# Patient Record
Sex: FEMALE | Race: WHITE | HISPANIC OR LATINO | Employment: UNEMPLOYED | URBAN - METROPOLITAN AREA
[De-identification: names, ages, dates, MRNs, and addresses within clinical notes are randomized per-mention and may not be internally consistent; named-entity substitution may affect disease eponyms.]

---

## 2017-01-01 ENCOUNTER — TRANSFERRED RECORDS (OUTPATIENT)
Dept: HEALTH INFORMATION MANAGEMENT | Facility: CLINIC | Age: 32
End: 2017-01-01

## 2017-01-01 LAB — PAP SMEAR - HIM PATIENT REPORTED: NEGATIVE

## 2018-06-01 PROCEDURE — 99282 EMERGENCY DEPT VISIT SF MDM: CPT

## 2018-06-02 ENCOUNTER — HOSPITAL ENCOUNTER (EMERGENCY)
Facility: CLINIC | Age: 33
Discharge: HOME | End: 2018-06-02
Attending: EMERGENCY MEDICINE | Admitting: EMERGENCY MEDICINE
Payer: COMMERCIAL

## 2018-06-02 VITALS
SYSTOLIC BLOOD PRESSURE: 166 MMHG | DIASTOLIC BLOOD PRESSURE: 115 MMHG | OXYGEN SATURATION: 99 % | TEMPERATURE: 98.3 F | RESPIRATION RATE: 16 BRPM | HEART RATE: 116 BPM

## 2018-06-02 DIAGNOSIS — H60.502 ACUTE OTITIS EXTERNA OF LEFT EAR, UNSPECIFIED TYPE: ICD-10-CM

## 2018-06-02 RX ORDER — CIPROFLOXACIN AND DEXAMETHASONE 3; 1 MG/ML; MG/ML
3 SUSPENSION/ DROPS AURICULAR (OTIC) 2 TIMES DAILY
Qty: 2.1 ML | Refills: 0 | Status: SHIPPED | OUTPATIENT
Start: 2018-06-02 | End: 2018-06-09

## 2018-06-02 ASSESSMENT — ENCOUNTER SYMPTOMS
COUGH: 0
FEVER: 0
NAUSEA: 0
SHORTNESS OF BREATH: 0
VOMITING: 0

## 2018-06-02 NOTE — DISCHARGE INSTRUCTIONS
"  Infección del oído externo (Adulto)     La otitis externa (también llamada \"oído de nadador\") es ashlyn infección en el canal del oído (auditivo). Generalmente la causan bacterias o un hongo. Puede presentarse unos pocos días después de que quede agua atrapada en el canal auditivo (al nadar o bañarse). También puede aparecer después de ashlyn limpieza muy profunda del canal auditivo con un hisopo de algodón u otro objeto. A veces, los productos para el cuidado del kristan entran en el canal auditivo y causan clara problema.  Los síntomas pueden incluir dolor, fiebre, comezón, enrojecimiento, secreción o hinchazón del canal auditivo. También puede presentarse ashlyn pérdida temporal de la audición.   Cuidados en jimenez casa    No intente limpiar el canal auditivo. Eso puede empujar el pus y las bacterias y hacer que entren todavía más en el canal.    Use las gotas que le recetaron para los oídos según las indicaciones. Estas ayudan a reducir la hinchazón y combaten la infección. Si se colocó ashlyn mecha en el canal auditivo, eche las gotas linda en el extremo de la mecha. Esta hará que el medicamento penetre en el canal auditivo, aunque esté cerrado por la hinchazón.    Se puede colocar ashlyn chas de algodón (que quede floja) en la parte externa de la oreja para absorber el líquido que pudiese supurar del oído.    A menos que le hayan recetado otro medicamento, puede usar paracetamol (acetaminofén) o ibuprofeno para para controlar el dolor. Nota: Si tiene alguna enfermedad crónica del hígado o de los riñones, o si alguna vez tuvo ashlyn úlcera estomacal o sangrado gastrointestinal, hable con jimenez proveedor de atención médica antes de mickey estos medicamentos.    No permita que le entre agua en el oído mark el baño. Además, evite nadar hasta que se haya curado la infección.   Prevención    Mantenga secos naveen oídos. Eso ayuda a reducir el riesgo de infección. Seque naveen orejas con ashlyn toalla o un secador de pelo después de mojarse. " Además, use tapones de oído cuando nade.    No introduzca ningún objeto en el oído para quitar cera.    Si siente que tiene agua atrapada en jimenez oído, colóquese enseguida gotas para los oídos. Puede comprarlas sin receta en la mayoría de las farmacias. Actúan quitando el agua del canal auditivo.   Visitas de control   Programe ashlyn visita de control con jimenez proveedor de atención médica en ashlyn semana, o según le hayan aconsejado.   Cuándo debe buscar atención médica   Llame enseguida a jimenez proveedor de atención médica si tiene cualquiera de los siguientes síntomas:    El dolor de oído empeora o no se grant después de kimmy días de tratamiento    Se presentan enrojecimiento o hinchazón en el oído externo o estos síntomas empeoran    Dolor de omar    Dolor de jameson o rigidez del jameson    Somnolencia o confusión    Fiebre de 100.4  F (38  C) o más, o según le haya indicado jimenez proveedor de atención médica    Convulsión   Date Last Reviewed: 3/22/2015    [id number] [employer]. [address] [place] [address]. Todos los derechos reservados. Esta información no pretende sustituir la atención médica profesional. Sólo jimenez médico puede diagnosticar y tratar un problema de suzette.

## 2018-06-02 NOTE — ED TRIAGE NOTES
Patient presents with complaint of right ear pain and drainage. Patient states she had drainage from the right ear starting this afternoon and it became painful at about the same time. States that her balance feels slightly off, but denies any other complaints. ABC intact, A&Ox4.

## 2018-06-02 NOTE — ED AVS SNAPSHOT
[facility] Emergency Department    [address]    [facility] [address]    Phone:  [phone]    Fax:  [phone]                                       Shila Batres   MRN: [id number]    Department:  [facility] Emergency Department   Date of Visit:  6/1/2018           After Visit Summary Signature Page     I have received my discharge instructions, and my questions have been answered. I have discussed any challenges I see with this plan with the nurse or doctor.    ..........................................................................................................................................  Patient/Patient Representative Signature      ..........................................................................................................................................  Patient Representative Print Name and Relationship to Patient    ..................................................               ................................................  Date                                            Time    ..........................................................................................................................................  Reviewed by Signature/Title    ...................................................              ..............................................  Date                                                            Time

## 2018-06-02 NOTE — ED AVS SNAPSHOT
" [facility] Emergency Department    [address] [facility] [address]    Phone:  [phone]    Fax:  [phone]                                       Shila Batres   MRN: [id number]    Department:  [facility] Emergency Department   Date of Visit:  6/1/2018           Patient Information     Date Of Birth          1985        Your diagnoses for this visit were:     Acute otitis externa of left ear, unspecified type        You were seen by Igor Barraza DO.      Follow-up Information     Follow up with Your primary care clinic. Call in 2 days.    Why:  As needed        Follow up with Tyree Hernandez MD. Call on 6/4/2018.    Specialty:  Otolaryngology    Why:  For further Ear specialty care follow up    Contact information:    [facility]  [address]  [facility] [address] [phone]          Follow up with [facility] Emergency Department.    Specialty:  EMERGENCY MEDICINE    Why:  If symptoms worsen    Contact information:    [address] [facility] [phone]        Discharge Instructions         Infección del oído externo (Adulto)     La otitis externa (también llamada \"oído de nadador\") es ashlyn infección en el canal del oído (auditivo). Generalmente la causan bacterias o un hongo. Puede presentarse unos pocos días después de que quede agua atrapada en el canal auditivo (al nadar o bañarse). También puede aparecer después de ashlyn limpieza muy profunda del canal auditivo con un hisopo de algodón u otro objeto. A veces, los productos para el cuidado del kristan entran en el canal auditivo y causan clara problema.  Los síntomas pueden incluir dolor, fiebre, comezón, enrojecimiento, secreción o hinchazón del canal auditivo. También puede presentarse ashlyn pérdida temporal de la audición.   Cuidados en jimenez casa    No intente limpiar el canal auditivo. Eso puede empujar el pus y las bacterias y " hacer que entren todavía más en el canal.    Use las gotas que le recetaron para los oídos según las indicaciones. Estas ayudan a reducir la hinchazón y combaten la infección. Si se colocó ashlyn mecha en el canal auditivo, eche las gotas linda en el extremo de la mecha. Esta hará que el medicamento penetre en el canal auditivo, aunque esté cerrado por la hinchazón.    Se puede colocar ashlyn chas de algodón (que quede floja) en la parte externa de la oreja para absorber el líquido que pudiese supurar del oído.    A menos que le hayan recetado otro medicamento, puede usar paracetamol (acetaminofén) o ibuprofeno para para controlar el dolor. Nota: Si tiene alguna enfermedad crónica del hígado o de los riñones, o si alguna vez tuvo ashlyn úlcera estomacal o sangrado gastrointestinal, hable con jimenez proveedor de atención médica antes de mickey estos medicamentos.    No permita que le entre agua en el oído mark el baño. Además, evite nadar hasta que se haya curado la infección.   Prevención    Mantenga secos naveen oídos. Eso ayuda a reducir el riesgo de infección. Seque naveen orejas con ashlyn toalla o un secador de pelo después de mojarse. Además, use tapones de oído cuando nade.    No introduzca ningún objeto en el oído para quitar cera.    Si siente que tiene agua atrapada en jimenez oído, colóquese enseguida gotas para los oídos. Puede comprarlas sin receta en la mayoría de las farmacias. Actúan quitando el agua del canal auditivo.   Visitas de control   Programe ashlyn visita de control con jimenez proveedor de atención médica en ashlyn semana, o según le hayan aconsejado.   Cuándo debe buscar atención médica   Llame enseguida a jimenez proveedor de atención médica si tiene cualquiera de los siguientes síntomas:    El dolor de oído empeora o no se grant después de kimmy días de tratamiento    Se presentan enrojecimiento o hinchazón en el oído externo o estos síntomas empeoran    Dolor de omar    Dolor de jameson o rigidez del jameson    Somnolencia o  confusión    Fiebre de 100.4  F (38  C) o más, o según le haya indicado jimenez proveedor de atención médica    Convulsión   Date Last Reviewed: 3/22/2015    9111-1542 [employer]. [address] [place] [address], [place] [address]. Todos los derechos reservados. Esta información no pretende sustituir la atención médica profesional. Sólo jimenez médico puede diagnosticar y tratar un problema de suzette.          24 Hour Appointment Hotline       To make an appointment at any [facility], call [phone] ([phone]). If you don't have a family doctor or clinic, we will help you find one. [facility] clinics are conveniently located to serve the needs of you and your family.             Review of your medicines      START taking        Dose / Directions Last dose taken    ciprofloxacin-dexamethasone otic suspension   Commonly known as:  CIPRODEX   Dose:  3 drop   Quantity:  2.1 mL        Place 3 drops Into the left ear 2 times daily for 7 days   Refills:  0          Our records show that you are taking the medicines listed below. If these are incorrect, please call your family doctor or clinic.        Dose / Directions Last dose taken    ibuprofen 600 MG tablet   Commonly known as:  ADVIL/MOTRIN   Dose:  600 mg   Quantity:  30 tablet        Take 1 tablet by mouth every 6 hours as needed for pain.   Refills:  1        NO ACTIVE MEDICATIONS        Refills:  0                Prescriptions were sent or printed at these locations (1 Prescription)                   Other Prescriptions                Printed at Department/Unit printer (1 of 1)         ciprofloxacin-dexamethasone (CIPRODEX) otic suspension                Orders Needing Specimen Collection     None      Pending Results     No orders found from 5/31/2018 to 6/3/2018.            Pending Culture Results     No orders found from 5/31/2018 to 6/3/2018.            Pending Results Instructions     If you had any lab results that were not finalized at the time of  your Discharge, you can call the ED Lab Result RN at [phone]. You will be contacted by this team for any positive Lab results or changes in treatment. The nurses are available 7 days a week from 10A to 6:30P.  You can leave a message 24 hours per day and they will return your call.        Test Results From Your Hospital Stay               Clinical Quality Measure: Blood Pressure Screening     Your blood pressure was checked while you were in the emergency department today. The last reading we obtained was  BP: (!) 166/115 . Please read the guidelines below about what these numbers mean and what you should do about them.  If your systolic blood pressure (the top number) is less than 120 and your diastolic blood pressure (the bottom number) is less than 80, then your blood pressure is normal. There is nothing more that you need to do about it.  If your systolic blood pressure (the top number) is 120-139 or your diastolic blood pressure (the bottom number) is 80-89, your blood pressure may be higher than it should be. You should have your blood pressure rechecked within a year by a primary care provider.  If your systolic blood pressure (the top number) is 140 or greater or your diastolic blood pressure (the bottom number) is 90 or greater, you may have high blood pressure. High blood pressure is treatable, but if left untreated over time it can put you at risk for heart attack, stroke, or kidney failure. You should have your blood pressure rechecked by a primary care provider within the next 4 weeks.  If your provider in the emergency department today gave you specific instructions to follow-up with your doctor or provider even sooner than that, you should follow that instruction and not wait for up to 4 weeks for your follow-up visit.        Thank you for choosing [facility]       Thank you for choosing [facility] for your care. Our goal is always to provide you with excellent care. Hearing back from our patients is  "one way we can continue to improve our services. Please take a few minutes to complete the written survey that you may receive in the mail after you visit with us. Thank you!        [employer] Information     [employer] lets you send messages to your doctor, view your test results, renew your prescriptions, schedule appointments and more. To sign up, go to [email or web address] . Click on \"Log in\" on the left side of the screen, which will take you to the Welcome page. Then click on \"Sign up Now\" on the right side of the page.     You will be asked to enter the access code listed below, as well as some personal information. Please follow the directions to create your username and password.     Your access code is: [email or web address]  Expires: 2018 12:54 AM     Your access code will  in 90 days. If you need help or a new code, please call your [facility] clinic or [phone].        Care EveryWhere ID     This is your Care EveryWhere ID. This could be used by other organizations to access your [facility] medical records  [id number]        Equal Access to Services     VIVIANA PEPPER : Hadran Lopez, collette bocanegra, dong stover, geraldine meier. So [facility] [phone].    ATENCIÓN: Si habla español, tiene a jimenez disposición servicios gratuitos de asistencia lingüística. Llame al [phone].    We comply with applicable federal civil rights laws and [place] laws. We do not discriminate on the basis of race, color, national origin, age, disability, sex, sexual orientation, or gender identity.            After Visit Summary       This is your record. Keep this with you and show to your community pharmacist(s) and doctor(s) at your next visit.                  "

## 2018-06-02 NOTE — ED PROVIDER NOTES
History     Chief Complaint:  Otalgia    Last night drainage from ear, left  Hurt when she tried applying drops that dry ear  Yellowish red drainage  Itchiness in left ear 2-3 days  shortness of breath, cough, fever no, nose drainage though  Hearing issue, light headed    The history is provided by the patient.      Shila Batres is a 33 year old female who presents to the emergency department for evaluation of otalgia. The patient reports that she has had itchiness inside left ear for the past 2-3 days, and has been scratching it and using cotton swabs to rub her left ear. Yesterday, she attempted applying drops to dry her ear but discovered that application caused her pain in the ear. Currently, the patient reports that since last night, she has had drainage from her left ear, accompanied by otalgia, which prompts her visit to the ED today. She notes the drainage is yellowish red and that she has had difficulty hearing others' speech and felt light headed. The patient denies any shortness of breath, cough, or fever.    Allergies:  NKDA     Medications:    Ibuprofen    Past Medical History:    H1N1  GDM  Ear perforation left ear    Past Surgical History:    Left ear surgery     Family History:    Diabetes    Social History:  Never smoker.  Negative for alcohol use.  Marital Status:  Single [1]     Review of Systems   Constitutional: Negative for fever.   HENT: Positive for ear discharge (left), ear pain (left) and hearing loss (slight).    Respiratory: Negative for cough and shortness of breath.    Gastrointestinal: Negative for nausea and vomiting.   All other systems reviewed and are negative.    Physical Exam     Patient Vitals for the past 24 hrs:   BP Temp Temp src Pulse Resp SpO2   06/02/18 0036 - 98.3  F (36.8  C) Oral - - -   06/01/18 2329 (!) 166/115 98.8  F (37.1  C) Oral 129 16 99 %       Physical Exam  Constitutional: Patient appears well-developed and well-nourished. There is no acute distress.    HEENT:                         Head: No external signs of trauma. No lesions noted.                        Eyes: PEERL, EOMI B/L, no pain or limitation of superior gaze                        Ears:                                               Normal right TM and external canal.                                               The left external canal is mildly swollen with some serosanguinous drainage. Full evaluation of the TM is not possible due to swelling. No obvious perforation at this time.                        Nose: Noncongested, no exudates. No rhinorrhea. No FB noted                        Mouth/Throat:                                               Mucous membranes are moist and normal.   Neck: FROM. Neck is supple  Cardiovascular: Tachycardic rate, regular rhythm and normal heart sounds.  Exam reveals no gallop and no friction rub.  No murmur heard. Equal B/L peripheral pulses.  Pulmonary/Chest: Effort normal and breath sounds normal. No respiratory distress. Patient has no wheezes. Patient has no rales.   Abdominal: Soft. There is no tenderness.   Extremities: No edema noted  Neurological: Patient is alert and oriented to person, place, and time. Hearing appears equal to finger rub testing.  Skin: Skin is warm and dry. There is no diaphoresis noted.     Emergency Department Course     Emergency Department Course:  Nursing notes and vitals reviewed.     0044 I performed an exam of the patient as documented above.     0058 I rechecked the patient and discussed the results of her workup thus far. Heart rate has improved to 116. The patient notes she drank coffee before coming in and does feel mildly anxious about her ear.    Findings and plan explained to the Patient. Patient discharged home with instructions regarding supportive care, medications, and reasons to return. The importance of close follow-up was reviewed. The patient was prescribed Ciprodex.    Impression & Plan      Medical Decision  Making:  Shila Batres presents for evaluation of otalgia on the left side.  The patient has an exam consistent with otitis externa.  Differential considered in this patient with otalgia included mastoiditis, meningitis, perforation, cerumen impaction, mass, dental abscess, or peritonsillar abscess, referred pain, cholesteatoma, otitis externa, etc.  She may take Tylenol or Ibuprofen for pain.  Wick was not placed.  Antibiotic drops w/steroids were prescribed. Heart rate improved without intervention in the ED.  The patient admits to drinking coffee and does feel somewhat anxious about her ear. Return if increasing pain, fever, decrease in hearing or ear discharge.  Follow-up with primary physician in 7-10 days, if symptoms persist and ENT consultation may be needed as outpatient.  Critical Care time:  none    Diagnosis:    ICD-10-CM   1. Acute otitis externa of left ear, unspecified type H60.502       Disposition:  discharged to home with Ciprodex.    Discharge Medications:  New Prescriptions    CIPROFLOXACIN-DEXAMETHASONE (CIPRODEX) OTIC SUSPENSION    Place 3 drops Into the left ear 2 times daily for 7 days     Arthur ZEE, am serving as a scribe on 6/2/2018 at 12:44 AM to personally document services performed by Igor Barrzaa DO based on my observations and the provider's statements to me.      Arthur Horner  6/1/2018   [facility] [place] EMERGENCY DEPARTMENT       Igor Barraza DO  06/02/18 0104

## 2018-08-13 ENCOUNTER — OFFICE VISIT (OUTPATIENT)
Dept: INTERNAL MEDICINE | Facility: CLINIC | Age: 33
End: 2018-08-13
Payer: COMMERCIAL

## 2018-08-13 VITALS
BODY MASS INDEX: 28 KG/M2 | SYSTOLIC BLOOD PRESSURE: 140 MMHG | OXYGEN SATURATION: 98 % | RESPIRATION RATE: 16 BRPM | WEIGHT: 142.6 LBS | HEIGHT: 60 IN | TEMPERATURE: 98.2 F | HEART RATE: 91 BPM | DIASTOLIC BLOOD PRESSURE: 96 MMHG

## 2018-08-13 DIAGNOSIS — Z86.69 HISTORY OF RECURRENT EAR INFECTION: ICD-10-CM

## 2018-08-13 DIAGNOSIS — Z13.220 SCREENING FOR CHOLESTEROL LEVEL: ICD-10-CM

## 2018-08-13 DIAGNOSIS — R03.0 ELEVATED BLOOD PRESSURE READING WITHOUT DIAGNOSIS OF HYPERTENSION: ICD-10-CM

## 2018-08-13 DIAGNOSIS — Z13.1 SCREENING FOR DIABETES MELLITUS: ICD-10-CM

## 2018-08-13 DIAGNOSIS — Z01.818 PREOPERATIVE EXAMINATION: Primary | ICD-10-CM

## 2018-08-13 LAB
ALBUMIN SERPL-MCNC: 4.1 G/DL (ref 3.4–5)
ALP SERPL-CCNC: 107 U/L (ref 40–150)
ALT SERPL W P-5'-P-CCNC: 34 U/L (ref 0–50)
ANION GAP SERPL CALCULATED.3IONS-SCNC: 6 MMOL/L (ref 3–14)
AST SERPL W P-5'-P-CCNC: 37 U/L (ref 0–45)
BILIRUB SERPL-MCNC: 0.4 MG/DL (ref 0.2–1.3)
BUN SERPL-MCNC: 12 MG/DL (ref 7–30)
CALCIUM SERPL-MCNC: 8.7 MG/DL (ref 8.5–10.1)
CHLORIDE SERPL-SCNC: 105 MMOL/L (ref 94–109)
CHOLEST SERPL-MCNC: 146 MG/DL
CO2 SERPL-SCNC: 30 MMOL/L (ref 20–32)
CREAT SERPL-MCNC: 0.75 MG/DL (ref 0.52–1.04)
GFR SERPL CREATININE-BSD FRML MDRD: 88 ML/MIN/1.7M2
GLUCOSE SERPL-MCNC: 96 MG/DL (ref 70–99)
HDLC SERPL-MCNC: 41 MG/DL
LDLC SERPL CALC-MCNC: 90 MG/DL
NONHDLC SERPL-MCNC: 105 MG/DL
POTASSIUM SERPL-SCNC: 4 MMOL/L (ref 3.4–5.3)
PROT SERPL-MCNC: 7.6 G/DL (ref 6.8–8.8)
SODIUM SERPL-SCNC: 141 MMOL/L (ref 133–144)
TRIGL SERPL-MCNC: 77 MG/DL

## 2018-08-13 PROCEDURE — 36415 COLL VENOUS BLD VENIPUNCTURE: CPT | Performed by: INTERNAL MEDICINE

## 2018-08-13 PROCEDURE — 80053 COMPREHEN METABOLIC PANEL: CPT | Performed by: INTERNAL MEDICINE

## 2018-08-13 PROCEDURE — 80061 LIPID PANEL: CPT | Performed by: INTERNAL MEDICINE

## 2018-08-13 PROCEDURE — 99215 OFFICE O/P EST HI 40 MIN: CPT | Performed by: INTERNAL MEDICINE

## 2018-08-13 NOTE — MR AVS SNAPSHOT
After Visit Summary   8/13/2018    Shila Batres    MRN: [id number]           Patient Information     Date Of Birth          1985        Visit Information        Provider Department      8/13/2018 9:45 AM Kassandra Dey MD [facility]        Today's Diagnoses     Screening for cholesterol level    -  1    Screening for diabetes mellitus          Care Instructions    Fasting labs today.    ---    STOP Phentermine - can cause high blood pressure.    ---    Follow-up in ~2 weeks for blood pressure recheck. If still high then (>130/80) then we will obtain and EKG treat.    ---    May proceed with surgery (we will fax H&P to surgery center).     ---    Avoid ibuprofen, Motrin, Advil, and oral decongestants as much as possible (these can increase blood pressure).                   Follow-ups after your visit        Who to contact     If you have questions or need follow up information about today's clinic visit or your schedule please contact [facility] directly at [phone].  Normal or non-critical lab and imaging results will be communicated to you by MyChart, letter or phone within 4 business days after the clinic has received the results. If you do not hear from us within 7 days, please contact the clinic through MyChart or phone. If you have a critical or abnormal lab result, we will notify you by phone as soon as possible.  Submit refill requests through [employer] or call your pharmacy and they will forward the refill request to us. Please allow 3 business days for your refill to be completed.          Additional Information About Your Visit        Care EveryWhere ID     This is your Care EveryWhere ID. This could be used by other organizations to access your [facility] medical records  [id number]        Your Vitals Were     Pulse Temperature Respirations Height Pulse Oximetry BMI (Body Mass Index)    91 98.2  F (36.8  C) (Oral) 16 5' (1.524  m) 98% 27.85 kg/m2       Blood Pressure from Last 3 Encounters:   08/13/18 (!) 140/96   06/01/18 (!) 166/115   03/08/16 (!) 128/92    Weight from Last 3 Encounters:   08/13/18 142 lb 9.6 oz (64.7 kg)   02/21/16 125 lb (56.7 kg)   12/19/11 136 lb 11.2 oz (62 kg)              We Performed the Following     Comprehensive metabolic panel     Lipid Profile        Primary Care Provider Fax #    Physician No Ref-Primary [phone]       No address on file        Equal Access to Services     [facility] : Hadii doris wilsono Sorenea, waaxda luqadaha, qaybta kaalmanestor stover, geraldine mitchell . So [facility] [phone].    ATENCIÓN: Si habla español, tiene a jimenez disposición servicios gratuitos de asistencia lingüística. Llame al [phone].    We comply with applicable federal civil rights laws and [place] laws. We do not discriminate on the basis of race, color, national origin, age, disability, sex, sexual orientation, or gender identity.            Thank you!     Thank you for choosing [facility]  for your care. Our goal is always to provide you with excellent care. Hearing back from our patients is one way we can continue to improve our services. Please take a few minutes to complete the written survey that you may receive in the mail after your visit with us. Thank you!             Your Updated Medication List - Protect others around you: Learn how to safely use, store and throw away your medicines at www.disposemymeds.org.          This list is accurate as of 8/13/18  9:56 AM.  Always use your most recent med list.                   Brand Name Dispense Instructions for use Diagnosis    ADIPEX-P PO

## 2018-08-13 NOTE — PATIENT INSTRUCTIONS
Fasting labs today.    ---    STOP Phentermine - can cause high blood pressure.    ---    Follow-up in ~2 weeks for blood pressure recheck. If still high then (>130/80) then we will obtain and EKG treat.    ---    May proceed with surgery (we will fax H&P to surgery center).     ---    Avoid ibuprofen, Motrin, Advil, and oral decongestants as much as possible (these can increase blood pressure).

## 2018-08-13 NOTE — PROGRESS NOTES
SUBJECTIVE:                                                      HPI: Shila Batres is a pleasant 33 year old female who presents for preoperative evaluation.    Surgeon: Mary  Date: 8/24/18  Location: [facility], Fax# [phone]  Surgery: LEFT tympanoplasty (low risk)  Indication: recurrent ear infections    Past Medical History:   Diagnosis Date     NO ACTIVE PROBLEMS      Personal or family history of excessive or prolonged bleeding? No  Personal or family history of blood clots? No  History of snoring/Sleep Apnea? No  History of blood transfusions? No    Clinical Predictors of Increased Risk: none    Past Surgical History:   Procedure Laterality Date     TYMPANOPLASTY Left 1996     Artificial assistive devices, such as pacemakers, artificial cardiac valves, or artificial joints? No    Allergies   Allergen Reactions     Nkda [No Known Drug Allergies]      Personal or family history of allergy to anesthesia? No  Allergy to local or topical analgesics? No  Allergy to latex? No  Allergy to adhesives/skin preparations (chlorhexadine)? No    Current Outpatient Prescriptions   Medication Sig     Phentermine HCl (ADIPEX-P PO)      Over the counter medications? Tylenol as needed   Vitamin Supplements? No  Herbal Supplements? No    Family History   Problem Relation Age of Onset     Type 2 Diabetes Mother      Hypertension Mother      Hyperlipidemia Mother      Lupus Sister      Unknown/Adopted Father      Myocardial Infarction No family hx of      Cerebrovascular Disease No family hx of      Coronary Artery Disease Early Onset No family hx of      Breast Cancer No family hx of      Ovarian Cancer No family hx of      Colon Cancer No family hx of      Occupational History     Payroll      Social History Main Topics     Smoking status: Never Smoker     Smokeless tobacco: Never Used     Alcohol use       Comment: 2-3 drinks, 2x/month     Drug use: No     Sexual activity: Yes     Partners: Male      Birth control/ protection: Condom     Social History Narrative    Single.    3 kids (18, 11, and 8 as of 2018).    1 grandchild (as of 2018).      Functional capacity: Can do heavy work around the house such as scrubbing floors or lifting or moving heavy furniture (4-10 METs)    ROS:  Constitutional: denies unintentional weight loss or gain; denies fevers, chills, or sweats     Cardiovascular: denies chest pain, palpitations, or edema  Respiratory: denies cough, wheezing, shortness of breath, or dyspnea on exertion  Gastrointestinal: denies nausea, vomiting, constipation, diarrhea, or abdominal pain  Genitourinary: denies urinary frequency, urgency, dysuria, or hematuria  Integumentary: denies rash or pruritus  Musculoskeletal: denies back pain, muscle pain, joint pain, or joint swelling  Neurologic: denies focal weakness, numbness, or tingling  Hematologic/Immunologic: denies history of anemia or blood transfusions  Endocrine: denies heat or cold intolerance; denies polyuria, polydipsia  Psychiatric: denies depression or anxiety    OBJECTIVE:                                                      BP (!) 140/96  Pulse 91  Temp 98.2  F (36.8  C) (Oral)  Resp 16  Ht 5' (1.524 m)  Wt 142 lb 9.6 oz (64.7 kg)  SpO2 98%  BMI 27.85 kg/m2  Constitutional: well-appearing  Eyes: normal conjunctivae and lids; pupils equal, round, and reactive to light  Ears, Nose, Mouth, and Throat: normal ears and nose; tympanic membranes visualized and normal; normal lips, teeth, and gums; no oropharyngeal lesions or ulcers  Neck: supple and symmetric; no lymphadenopathy; no thyromegaly or masses  Respiratory: normal respiratory effort; clear to auscultation bilaterally  Cardiovascular: regular rate and rhythm; pedal pulses palpable; no edema  Gastrointestinal: soft, non-tender, non-distended, and bowel sounds present; no organomegaly or masses  Musculoskeletal: normal gait and station  Psych: normal judgment and insight; normal mood  and affect; recent and remote memory intact; oriented to time, place, and person    ASSESSMENT/PLAN:                                                      Shila Batres is a pleasant 33 year old female who presents for a preoperative evaluation. She is at low clinical risk for a low risk procedure.    (Z01.818) Preoperative examination  (primary encounter diagnosis)  (Z86.69) History of recurrent ear infection  (R03.0) Elevated blood pressure reading without diagnosis of hypertension  Comment: elevated blood pressure may be related to chronic phentermine use.  Plan:    - STOP phentermine.   - follow-up in ~2 weeks for blood pressure recheck.    - if still elevated, will obtain EKG and start medication.   - may proceed with surgery in the meantime.   - no additional preoperative testing indicated at this time    ---    (Z13.220) Screening for cholesterol level  (Z13.1) Screening for diabetes mellitus  Comment: unrelated to surgery.  Plan: fasting labs today.    RECOMMEND PROCEEDING WITH SURGERY: YES.    Thank you for referring Shila Batres to me for consultation and allowing me to participate in her care.    The instructions on the AVS were discussed and explained to the patient. Patient expressed understanding of instructions.    (Chart documentation was completed, in part, with [employer] voice-recognition software. Even though reviewed, some grammatical, spelling, and word errors may remain.)    Kassandra Dey MD   [address] [address]  T: [phone], F: [phone]

## 2018-08-13 NOTE — LETTER
08/13/18      [address] [address]res  [address] [place] [address]        Dear ,    It was a pleasure meeting you the other day! I hope this finds you well.    I wanted to let you know that your labs came back as normal.    See you in a couple weeks for a blood pressure recheck.     Please feel free to contact me with any questions or concerns.      Take care,    Kassandra Dey MD   [address] [address]  T: [phone], F: [phone]

## 2018-08-19 ENCOUNTER — HEALTH MAINTENANCE LETTER (OUTPATIENT)
Age: 33
End: 2018-08-19

## 2018-10-25 ENCOUNTER — OFFICE VISIT (OUTPATIENT)
Dept: INTERNAL MEDICINE | Facility: CLINIC | Age: 33
End: 2018-10-25
Payer: COMMERCIAL

## 2018-10-25 VITALS
SYSTOLIC BLOOD PRESSURE: 134 MMHG | RESPIRATION RATE: 14 BRPM | HEART RATE: 88 BPM | DIASTOLIC BLOOD PRESSURE: 86 MMHG | OXYGEN SATURATION: 98 % | BODY MASS INDEX: 30.08 KG/M2 | TEMPERATURE: 98.5 F | WEIGHT: 154 LBS

## 2018-10-25 DIAGNOSIS — I10 BENIGN ESSENTIAL HYPERTENSION: Primary | ICD-10-CM

## 2018-10-25 DIAGNOSIS — E66.9 OBESITY (BMI 30-39.9): ICD-10-CM

## 2018-10-25 PROCEDURE — 99213 OFFICE O/P EST LOW 20 MIN: CPT | Performed by: INTERNAL MEDICINE

## 2018-10-25 RX ORDER — LOSARTAN POTASSIUM 50 MG/1
50 TABLET ORAL DAILY
Qty: 90 TABLET | Refills: 3 | Status: SHIPPED | OUTPATIENT
Start: 2018-10-25 | End: 2019-10-14

## 2018-10-25 NOTE — PATIENT INSTRUCTIONS
START losartan 50mg daily.    --    Referral to nutrition placed - number below.    --    Try to exercise ~5 times a weak (goal is 30-45 minutes of huffing, puffing, and sweating).

## 2018-10-25 NOTE — PROGRESS NOTES
SUBJECTIVE:                                                      HPI: Shila Batres is a pleasant 33 year old female who presents for blood pressure follow-up:    Blood pressure was elevated at her preop in August. Asked to stop phentermine (was on chronically for weight loss) and to follow-up.     Blood pressure is improved but still borderline/elevated.     She feels well - no complaints.     Of note patient is obese and exercises ~2x/week. She has not had much success with diets in the past and is interested in meeting with a nutritionist to discuss weight loss. She is also aware that she needs to exercise more frequently and vigorously to help achieve weight loss.     The medication, allergy, and problem lists have been reviewed and updated as appropriate.       OBJECTIVE:                                                      /86 (BP Location: Left arm, Patient Position: Chair, Cuff Size: Adult Large)  Pulse 88  Temp 98.5  F (36.9  C) (Oral)  Resp 14  Wt 154 lb (69.9 kg)  SpO2 98%  BMI 30.08 kg/m2  Constitutional: well-appearing  Psych: normal judgment and insight; normal mood and affect; recent and remote memory intact      ASSESSMENT/PLAN:                                                      (I10) Benign essential hypertension  (primary encounter diagnosis)  Comment: borderline/elevated blood pressure without medication.   Plan:    - patient encouraged to adhere to a heart healthy diet, exercise regularly, and lose weight.   - START losartan 50mg daily.   - follow-up in 1 month for blood pressure check, EKG, and BMP.     (E66.9) Obesity (BMI 30-39.9)  Plan:    - referred to nutrition to discuss weight loss.   - patient to exercise regularly (at least 30 minutes/day, 5 days/week of cardiovascularly vigorous exercise).    The instructions on the AVS were discussed and explained to the patient. Patient expressed understanding of instructions.    (Chart documentation was completed, in part, with  Dr[employer] voice-recognition software. Even though reviewed, some grammatical, spelling, and word errors may remain.)    Kassandra Dey MD   [address] [address]  T: [phone], F: [phone]

## 2018-10-25 NOTE — MR AVS SNAPSHOT
After Visit Summary   10/25/2018    Shila Batres    MRN: [id number]           Patient Information     Date Of Birth          1985        Visit Information        Provider Department      10/25/2018 1:15 PM Kassandra Dey MD [facility]        Today's Diagnoses     Benign essential hypertension    -  1      Care Instructions    START losartan 50mg daily.    --    Referral to nutrition placed - number below.    --    Try to exercise ~5 times a weak (goal is 30-45 minutes of huffing, puffing, and sweating).           Follow-ups after your visit        Additional Services     NUTRITION REFERRAL       Your provider has referred you to: [facility]: [facility] [phone]   [email or web address]    Please be aware that coverage of these services is subject to the terms and limitations of your health insurance plan.  Call member services at your health plan with any benefit or coverage questions.      Please bring the following with you to your appointment:    (1) This referral request  (2) Any documents given to you regarding this referral  (3) Any specific questions you have about diet and/or food choices                  Who to contact     If you have questions or need follow up information about today's clinic visit or your schedule please contact [facility] directly at [phone].  Normal or non-critical lab and imaging results will be communicated to you by MyChart, letter or phone within 4 business days after the clinic has received the results. If you do not hear from us within 7 days, please contact the clinic through MyChart or phone. If you have a critical or abnormal lab result, we will notify you by phone as soon as possible.  Submit refill requests through [employer] or call your pharmacy and they will forward the refill request to us. Please allow 3 business days for your refill  to be completed.          Additional Information About Your Visit        Care EveryWhere ID     This is your Care EveryWhere ID. This could be used by other organizations to access your [facility] medical records  [id number]        Your Vitals Were     Pulse Temperature Respirations Pulse Oximetry BMI (Body Mass Index)       88 98.5  F (36.9  C) (Oral) 14 98% 30.08 kg/m2        Blood Pressure from Last 3 Encounters:   10/25/18 134/86   08/13/18 (!) 140/96   06/01/18 (!) 166/115    Weight from Last 3 Encounters:   10/25/18 154 lb (69.9 kg)   08/13/18 142 lb 9.6 oz (64.7 kg)   02/21/16 125 lb (56.7 kg)              We Performed the Following     NUTRITION REFERRAL          Today's Medication Changes          These changes are accurate as of 10/25/18  1:32 PM.  If you have any questions, ask your nurse or doctor.               Start taking these medicines.        Dose/Directions    losartan 50 MG tablet   Commonly known as:  COZAAR   Used for:  Benign essential hypertension   Started by:  Kassandra Dey MD        Dose:  50 mg   Take 1 tablet (50 mg) by mouth daily   Quantity:  90 tablet   Refills:  3            Where to get your medicines      These medications were sent to [facility] [place] [id number]     Phone:  [phone]     losartan 50 MG tablet                Primary Care Provider Office Phone # Fax #    Kassandra Dey [phone]       [address] [place] [address]        Equal Access to Services     ARABELLA PEPPER : Hadii doris ku hadasho Soomaali, waaxda luqadaha, qaybta kaalmada jolanta, waxjuan carlos leonearl meier. So [facility] [phone].    ATENCIÓN: Si habla español, tiene a jimenez disposición servicios gratuitos de asistencia lingüística. Llame al [phone].    We comply with applicable federal civil rights laws and [place] laws. We do not discriminate on the basis of race, color, national  origin, age, disability, sex, sexual orientation, or gender identity.            Thank you!     Thank you for choosing [facility]  for your care. Our goal is always to provide you with excellent care. Hearing back from our patients is one way we can continue to improve our services. Please take a few minutes to complete the written survey that you may receive in the mail after your visit with us. Thank you!             Your Updated Medication List - Protect others around you: Learn how to safely use, store and throw away your medicines at www.disposemymeds.org.          This list is accurate as of 10/25/18  1:32 PM.  Always use your most recent med list.                   Brand Name Dispense Instructions for use Diagnosis    losartan 50 MG tablet    COZAAR    90 tablet    Take 1 tablet (50 mg) by mouth daily    Benign essential hypertension

## 2019-10-14 ENCOUNTER — OFFICE VISIT (OUTPATIENT)
Dept: INTERNAL MEDICINE | Facility: CLINIC | Age: 34
End: 2019-10-14
Payer: COMMERCIAL

## 2019-10-14 VITALS
OXYGEN SATURATION: 100 % | DIASTOLIC BLOOD PRESSURE: 90 MMHG | WEIGHT: 151.5 LBS | HEART RATE: 99 BPM | SYSTOLIC BLOOD PRESSURE: 120 MMHG | TEMPERATURE: 97.5 F | RESPIRATION RATE: 16 BRPM | BODY MASS INDEX: 29.59 KG/M2

## 2019-10-14 DIAGNOSIS — I10 BENIGN ESSENTIAL HYPERTENSION: ICD-10-CM

## 2019-10-14 PROCEDURE — 99213 OFFICE O/P EST LOW 20 MIN: CPT | Performed by: INTERNAL MEDICINE

## 2019-10-14 RX ORDER — LOSARTAN POTASSIUM 50 MG/1
50 TABLET ORAL DAILY
Qty: 90 TABLET | Refills: 3 | Status: SHIPPED | OUTPATIENT
Start: 2019-10-14 | End: 2020-05-14

## 2019-10-14 NOTE — PROGRESS NOTES
Subjective     Shila Batres is a 34 year old female who presents to clinic today for the following health issues:    HPI   Hypertension Follow-up      Do you check your blood pressure regularly outside of the clinic? Yes     Are you following a low salt diet? No    Are your blood pressures ever more than 140 on the top number (systolic) OR more   than 90 on the bottom number (diastolic), for example 140/90? No      How many servings of fruits and vegetables do you eat daily?  0-1    On average, how many sweetened beverages do you drink each day (soda, juice, sweet tea, etc)?   2    How many days per week do you miss taking your medication? 0        She continues to struggle some with her weight, she is not participating in regular aerobic exercise as yet, drinks alcohol fairly regularly on weekends.  She is not opposed to continuing medication as currently, but would eventually like to stop the medication if possible.  She did not take her dose this morning.          Reviewed and updated as needed this visit by Provider  Tobacco  Allergies  Meds  Problems  Med Hx  Surg Hx  Fam Hx         Review of Systems   ROS COMP: Constitutional, HEENT, cardiovascular, pulmonary, GI, , musculoskeletal, neuro, skin, endocrine and psych systems are negative, except as otherwise noted.      Objective    BP (!) 120/90 (BP Location: Left arm, Patient Position: Chair, Cuff Size: Adult Regular)   Pulse 99   Temp 97.5  F (36.4  C) (Oral)   Resp 16   Wt 68.7 kg (151 lb 8 oz)   SpO2 100%   BMI 29.59 kg/m    Body mass index is 29.59 kg/m .  Physical Exam   GENERAL: healthy, alert and no distress  NECK: no adenopathy, no asymmetry, masses, or scars and thyroid normal to palpation  RESP: lungs clear to auscultation - no rales, rhonchi or wheezes  CV: regular rate and rhythm, normal S1 S2, no S3 or S4, no murmur, click or rub, no peripheral edema and peripheral pulses strong  ABDOMEN: soft, nontender, no hepatosplenomegaly,  no masses and bowel sounds normal  MS: no gross musculoskeletal defects noted, no edema    Diagnostic Test Results:  Labs reviewed in Epic        Assessment & Plan     1. Benign essential hypertension  Continue losartan as dosed currently.  Reiterated the importance of dietary modification and weight control as means of helping to control blood pressure.  Check BP at home (script for home cuff given).  - losartan (COZAAR) 50 MG tablet; Take 1 tablet (50 mg) by mouth daily  Dispense: 90 tablet; Refill: 3  - Blood Pressure Monitor KIT; Check blood pressure daily  Dispense: 1 kit; Refill: 0       See Patient Instructions    Return in about 6 months (around 4/14/2020) for Blood Pressure Check.    Tan Khanna MD  [facility]

## 2020-05-13 ENCOUNTER — NURSE TRIAGE (OUTPATIENT)
Dept: NURSING | Facility: CLINIC | Age: 35
End: 2020-05-13

## 2020-05-13 NOTE — TELEPHONE ENCOUNTER
Sister in law dx with COVID 19.  Pt states she had flu like sx starting May 3. Feeling better.   States she is still having coughing and scratchy throat. No SOB.   More fatigued.   Light headed when wearing mask and driving. RN advised against driving if feeling light headed.   Pt also on BP medication. Due to this RN recommended phone visit with PCP to discuss.   Pt agreed to plan.   RN also discussed isolation recommendations based on possible COVID 19 exposure and symptoms.    Pt stated understanding and agreed to plan. Pt was transferred to schedule follow up with PCP.     Orly Lucero RN   [facility] RN Triage       COVID 19 Nurse Triage Plan/Patient Instructions    Please be aware that novel coronavirus (COVID-19) may be circulating in the community. If you develop symptoms such as fever, cough, or SOB or if you have concerns about the presence of another infection including coronavirus (COVID-19), please contact your health care provider or visit [email or web address].     Disposition/Instructions    Patient to have scheduled Telephone Visit with a provider. Follow System Ambulatory Workflow for COVID 19.     The clinic staff will assist you to schedule an appointment to complete the Telephone Visit with a provider during normal clinic hours.       Call Back If: Your symptoms worsen before you are able to complete your Telephone Visit with a provider.  Additional COVID19 information to add for patients.     Additional General Information About COVID-19    Whether or not you've been tested for COVID-19    Stay home and away from others (self-isolate) until:    At least 10 days have passed since your symptoms started. And     You've had no fever--and no medicine that reduces fever--for 3 full days (72 hours). And      Your other symptoms have resolved (gotten better).     During this time:    Stay in your own room (and use your own bathroom), if you can.    Stay away from others in your home. No hugging,  kissing or shaking hands.    No visitors.    Don't go to work, school or anywhere else.     Clean  high touch  surfaces often (doorknobs, counters, handles, etc.). Use a household cleaning spray or wipes.    Cover your mouth and nose with a mask, tissue or wash cloth to avoid spreading germs.    Wash your hands and face often. Use soap and water.    For more tips, go to https://www.cdc.gov/coronavirus/2019-ncov/downloads/10Things.pdf.    How can I take care of myself?    1. Get lots of rest. Drink extra fluids (unless a doctor has told you not to).     2. Take Tylenol (acetaminophen) for fever or pain. If you have liver or kidney problems, ask your family doctor if it's okay to take Tylenol.     Adults can take either:     650 mg (two 325 mg pills) every 4 to 6 hours, or     1,000 mg (two 500 mg pills) every 8 hours as needed.     Note: Don't take more than 3,000 mg in one day.   Acetaminophen is found in many medicines (both prescribed and over-the-counter medicines). Read all labels to be sure you don't take too much.   For children, check the Tylenol bottle for the right dose. The dose is based on  the child's age or weight.    3. If you have other health problems (like cancer, heart failure, an organ transplant or severe kidney disease): Call your specialty clinic if you don't feel better in the next 2 days.    4. Know when to call 911: If your breathing is so bad that it keeps you from doing normal activities, call 911 or go to the emergency room. Tell them that you've been staying home and may have COVID-19..      What are the symptoms of COVID-19?     The most common symptoms are cough, fever and trouble breathing.     Less common symptoms include body aches, chills, diarrhea (loose, watery poops), fatigue (feeling very tired), headache, runny nose, sore throat and loss of smell.     COVID-19 can cause severe coughing (bronchitis) and lung infection (pneumonia).    How does it spread?     The virus may spread  when a person coughs or sneezes into the air. The virus can travel about 6 feet this way, and it can live on surfaces.      Common  (household disinfectants) will kill the virus.    Who is at risk?  Anyone can catch COVID-19 if they're around someone who has the virus.    How can others protect themselves?     Stay away from people who have COVID-19 (or symptoms of COVID-19).    Wash hands often with soap and water. Or, use hand  with at least 60% alcohol.    Avoid touching the eyes, nose or mouth.     Wear a face mask when you go out in public, when sick or when caring for a sick person.      For more about COVID-19 and caring for yourself at home, please visit the CDC website at https://www.cdc.gov/coronavirus/2019-ncov/about/steps-when-sick.html.     To learn about care at [facility], go to [email or web address].    Below are the COVID-19 hotlines at the [place] Department of Health ([facility]). Interpreters are available.     For health questions: Call 012-599-0261 or 1-198.560.1228 (7 a.m. to 7 p.m.)    For questions about schools and childcare: Call 599-546-5939 or 1-413.833.6295 (7 a.m. to 7 p.m.)      Thank you for limiting contact with others, wearing a simple mask to cover your cough, practice good hand hygiene habits and accessing our [email or web address] services where possible to limit the spread of this virus.    For more information about COVID19 and options for caring for yourself at home, please visit the CDC website at https://www.cdc.gov/coronavirus/2019-ncov/about/steps-when-sick.html  For more options for care at [facility], please visit our website at [email or web address]    For more information, please use the [place] Department of Health COVID-19 Website: https://www.health.[facility].[place].us/diseases/coronavirus/index.html  [place] Department of [facility] ([facility]) COVID-19 Hotlines (Interpreters available):      Health questions:  Phone Number: 885.466.3346 or 1-391.783.4742 and Hours: 7 a.m. to 7 p.m.    Schools and  questions: Phone Number: 866.591.1140 or 1-534.324.4387 and Hours 7 a.m. to 7 p.m.                  Reason for Disposition    [1] COVID-19 infection diagnosed or suspected AND [2] mild symptoms (fever, cough) AND [3] no trouble breathing or other complications    Additional Information    Negative: SEVERE difficulty breathing (e.g., struggling for each breath, speaks in single words)    Negative: MODERATE difficulty breathing (e.g., speaks in phrases, SOB even at rest, pulse 100-120)    Negative: MILD difficulty breathing (e.g., minimal/no SOB at rest, SOB with walking, pulse <100)    Negative: Patient sounds very sick or weak to the triager    Negative: [1] COVID-19 suspected (e.g., cough, fever, shortness of breath) AND [2] mild symptoms AND [3] public health department recommends testing    Negative: [1] COVID-19 exposure AND [2] no symptoms    Negative: COVID-19 and Breastfeeding, questions about    Negative: Difficult to awaken or acting confused (e.g., disoriented, slurred speech)    Negative: Bluish (or gray) lips or face now    Negative: Shock suspected (e.g., cold/pale/clammy skin, too weak to stand, low BP, rapid pulse)    Negative: Sounds like a life-threatening emergency to the triager    Negative: SEVERE or constant chest pain (Exception: mild central chest pain, present only when coughing)    Negative: Chest pain    Negative: [1] Fever > 100.0 F (37.8 C) AND [2] bedridden (e.g., nursing home patient, CVA, chronic illness, recovering from surgery)    Negative: [1] Fever > 101 F (38.3 C) AND [2] age > 60    Negative: Fever > 103 F (39.4 C)    Negative: HIGH RISK patient (e.g., age > 64 years, diabetes, heart or lung disease, weak immune system)    Negative: Fever present > 3 days (72 hours)    Negative: [1] Fever returns after gone for over 24 hours AND [2] symptoms worse or not improved    Negative: [1]  "Continuous (nonstop) coughing interferes with work or school AND [2] no improvement using cough treatment per protocol    Negative: Cough present > 3 weeks    Negative: Dizziness caused by recent heat exposure    Negative: Severe difficulty breathing (e.g., struggling for each breath, speaks in single words)    Negative: [1] Difficulty breathing or swallowing AND [2] started suddenly after medicine, an allergic food or bee sting    Negative: Shock suspected (e.g., cold/pale/clammy skin, too weak to stand, low BP, rapid pulse)    Negative: Difficult to awaken or acting confused (e.g., disoriented, slurred speech)    Negative: [1] Weakness (i.e., paralysis, loss of muscle strength) of the face, arm or leg on one side of the body AND [2] sudden onset AND [3] present now    Negative: [1] Numbness (i.e., loss of sensation) of the face, arm or leg on one side of the body AND [2] sudden onset AND [3] present now    Negative: [1] Loss of speech or garbled speech AND [2] sudden onset AND [3] present now    Negative: Overdose (accidental or intentional) of medications    Negative: [1] Fainted > 15 minutes ago AND [2] still feels too weak or dizzy to stand    Negative: Heart beating < 50 beats per minute OR > 140 beats per minute    Negative: Sounds like a life-threatening emergency to the triager    Negative: Chest pain    Negative: Rectal bleeding, bloody stool, or tarry-black stool    Negative: [1] Vomiting AND [2] contains red blood or black (\"coffee ground\") material    Negative: Vomiting is main symptom    Negative: Diarrhea is main symptom    Negative: Headache is main symptom    Negative: Patient states that he/she is having an anxiety/panic attack    Negative: Dizziness from low blood sugar (i.e., < 60 mg/dl or 3.5 mmol/l)    Negative: Dizziness is described as a spinning sensation (i.e., vertigo)    Negative: Heat exhaustion suspected (i.e., dehydration from heat exposure)    Negative: Difficulty breathing    " "Negative: SEVERE dizziness (e.g., unable to stand, requires support to walk, feels like passing out now)    Negative: Extra heart beats OR irregular heart beating  (i.e., \"palpitations\")    Negative: [1] Drinking very little AND [2] dehydration suspected (e.g., no urine > 12 hours, very dry mouth, very lightheaded)    Negative: Patient sounds very sick or weak to the triager    Negative: [1] Dizziness caused by heat exposure, sudden standing, or poor fluid intake AND [2] no improvement after 2 hours of rest and fluids    Negative: [1] Fever > 103 F (39.4 C) AND [2] not able to get the fever down using Fever Care Advice    Negative: [1] Fever > 101 F (38.3 C) AND [2] age > 60    Negative: [1] Fever > 100.0 F (37.8 C) AND [2] bedridden (e.g., nursing home patient, CVA, chronic illness, recovering from surgery)    Negative: [1] Fever > 100.0 F (37.8 C) AND [2] diabetes mellitus or weak immune system (e.g., HIV positive, cancer chemo, splenectomy, organ transplant, chronic steroids)    Taking a medicine that could cause dizziness (e.g., blood pressure medications, diuretics)    Negative: Fever present > 3 days (72 hours)    Negative: [1] MODERATE dizziness (e.g., interferes with normal activities) AND [2] has NOT been evaluated by physician for this  (Exception: dizziness caused by heat exposure, sudden standing, or poor fluid intake)    Protocols used: CORONAVIRUS (COVID-19) DIAGNOSED OR [id number]-CB 4.22.20, DIZZINESS - [id number]-L-IV      "

## 2020-05-14 ENCOUNTER — VIRTUAL VISIT (OUTPATIENT)
Dept: INTERNAL MEDICINE | Facility: CLINIC | Age: 35
End: 2020-05-14
Payer: COMMERCIAL

## 2020-05-14 VITALS — BODY MASS INDEX: 27.48 KG/M2 | WEIGHT: 140 LBS | HEIGHT: 60 IN

## 2020-05-14 DIAGNOSIS — I10 BENIGN ESSENTIAL HYPERTENSION: ICD-10-CM

## 2020-05-14 PROCEDURE — 99213 OFFICE O/P EST LOW 20 MIN: CPT | Mod: 95 | Performed by: PHYSICIAN ASSISTANT

## 2020-05-14 RX ORDER — LOSARTAN POTASSIUM 50 MG/1
50 TABLET ORAL DAILY
Qty: 30 TABLET | Refills: 0 | Status: SHIPPED | OUTPATIENT
Start: 2020-05-14 | End: 2020-08-11

## 2020-05-14 ASSESSMENT — MIFFLIN-ST. JEOR: SCORE: 1241.54

## 2020-05-14 NOTE — PROGRESS NOTES
"Shila Batres is a 35 year old female who is being evaluated via a billable telephone visit.      The patient has been notified of following:     \"This telephone visit will be conducted via a call between you and your physician/provider. We have found that certain health care needs can be provided without the need for a physical exam.  This service lets us provide the care you need with a short phone conversation.  If a prescription is necessary we can send it directly to your pharmacy.  If lab work is needed we can place an order for that and you can then stop by our lab to have the test done at a later time.    Telephone visits are billed at different rates depending on your insurance coverage. During this emergency period, for some insurers they may be billed the same as an in-person visit.  Please reach out to your insurance provider with any questions.    If during the course of the call the physician/provider feels a telephone visit is not appropriate, you will not be charged for this service.\"    Patient has given verbal consent for Telephone visit?  Yes    What phone number would you like to be contacted at? [phone]    How would you like to obtain your AVS? Mail a copy    Subjective     Shila Batres is a 35 year old female who presents to clinic today for the following health issues:    HPI  Hypertension Follow-up      Do you check your blood pressure regularly outside of the clinic? No     Are you following a low salt diet? Yes    Are your blood pressures ever more than 140 on the top number (systolic) OR more   than 90 on the bottom number (diastolic), for example 140/90? N/a  Patient has been out of medication for a few weeks. She has not been able to check BP as she has no cuff at home.       How many servings of fruits and vegetables do you eat daily?  2-3    On average, how many sweetened beverages do you drink each day (Examples: soda, juice, sweet tea, etc.  Do NOT count diet or " artificially sweetened beverages)?   1    How many days per week do you exercise enough to make your heart beat faster? 3 or less    How many minutes a day do you exercise enough to make your heart beat faster? 30 - 60    How many days per week do you miss taking your medication? 0      Reviewed and updated as needed this visit by Provider  Meds  Problems              Objective   Reported vitals:  Ht 1.524 m (5')   Wt 63.5 kg (140 lb)   BMI 27.34 kg/m     healthy, alert and no distress  PSYCH: Alert and oriented times 3; coherent speech, normal   rate and volume, able to articulate logical thoughts, able   to abstract reason, no tangential thoughts, no hallucinations   or delusions  Her affect is normal  RESP: No cough, no audible wheezing, able to talk in full sentences  Remainder of exam unable to be completed due to telephone visits    Diagnostic Test Results:  Labs reviewed in Epic        Assessment/Plan:  1. Benign essential hypertension  - out of medication x 2 weeks, start medication, update staff with BP readings or do curbside BP check within in a few weeks, will also need a CMP at that point   - will plan to refill for longer after above is completed   - losartan (COZAAR) 50 MG tablet; Take 1 tablet (50 mg) by mouth daily  Dispense: 30 tablet; Refill: 0  - Home Blood Pressure Monitor Order for DME - ONLY FOR DME  - **Comprehensive metabolic panel FUTURE anytime; Future    No follow-ups on file.      Phone call duration:  3:16 minutes    Shanthi Vargas PA-C

## 2020-08-11 ENCOUNTER — OFFICE VISIT (OUTPATIENT)
Dept: INTERNAL MEDICINE | Facility: CLINIC | Age: 35
End: 2020-08-11
Payer: COMMERCIAL

## 2020-08-11 ENCOUNTER — NURSE TRIAGE (OUTPATIENT)
Dept: INTERNAL MEDICINE | Facility: CLINIC | Age: 35
End: 2020-08-11

## 2020-08-11 VITALS
TEMPERATURE: 97.8 F | BODY MASS INDEX: 30.6 KG/M2 | DIASTOLIC BLOOD PRESSURE: 80 MMHG | WEIGHT: 156.7 LBS | OXYGEN SATURATION: 98 % | HEART RATE: 80 BPM | RESPIRATION RATE: 16 BRPM | SYSTOLIC BLOOD PRESSURE: 115 MMHG

## 2020-08-11 DIAGNOSIS — I10 BENIGN ESSENTIAL HYPERTENSION: ICD-10-CM

## 2020-08-11 DIAGNOSIS — M79.645 THUMB PAIN, LEFT: Primary | ICD-10-CM

## 2020-08-11 LAB
ALBUMIN SERPL-MCNC: 4.1 G/DL (ref 3.4–5)
ALP SERPL-CCNC: 92 U/L (ref 40–150)
ALT SERPL W P-5'-P-CCNC: 28 U/L (ref 0–50)
ANION GAP SERPL CALCULATED.3IONS-SCNC: 6 MMOL/L (ref 3–14)
AST SERPL W P-5'-P-CCNC: 17 U/L (ref 0–45)
BILIRUB SERPL-MCNC: 0.7 MG/DL (ref 0.2–1.3)
BUN SERPL-MCNC: 11 MG/DL (ref 7–30)
CALCIUM SERPL-MCNC: 9.6 MG/DL (ref 8.5–10.1)
CHLORIDE SERPL-SCNC: 107 MMOL/L (ref 94–109)
CO2 SERPL-SCNC: 26 MMOL/L (ref 20–32)
CREAT SERPL-MCNC: 0.76 MG/DL (ref 0.52–1.04)
GFR SERPL CREATININE-BSD FRML MDRD: >90 ML/MIN/{1.73_M2}
GLUCOSE SERPL-MCNC: 90 MG/DL (ref 70–99)
POTASSIUM SERPL-SCNC: 4.2 MMOL/L (ref 3.4–5.3)
PROT SERPL-MCNC: 7.7 G/DL (ref 6.8–8.8)
SODIUM SERPL-SCNC: 139 MMOL/L (ref 133–144)

## 2020-08-11 PROCEDURE — 80053 COMPREHEN METABOLIC PANEL: CPT | Performed by: PHYSICIAN ASSISTANT

## 2020-08-11 PROCEDURE — 36415 COLL VENOUS BLD VENIPUNCTURE: CPT | Performed by: PHYSICIAN ASSISTANT

## 2020-08-11 PROCEDURE — 99214 OFFICE O/P EST MOD 30 MIN: CPT | Performed by: PHYSICIAN ASSISTANT

## 2020-08-11 RX ORDER — LOSARTAN POTASSIUM 50 MG/1
50 TABLET ORAL DAILY
Qty: 90 TABLET | Refills: 1 | Status: SHIPPED | OUTPATIENT
Start: 2020-08-11 | End: 2021-02-03

## 2020-08-11 NOTE — LETTER
August 13, 2020      Shila Medardo  [address] [address]        Dear ,    We are writing to inform you of your test results.    Your test results fall within the expected range(s) or remain unchanged from previous results.  Please continue with current treatment plan.    Resulted Orders   Comprehensive metabolic panel (BMP + Alb, Alk Phos, ALT, AST, Total. Bili, TP)   Result Value Ref Range    Sodium 139 133 - 144 mmol/L    Potassium 4.2 3.4 - 5.3 mmol/L    Chloride 107 94 - 109 mmol/L    Carbon Dioxide 26 20 - 32 mmol/L    Anion Gap 6 3 - 14 mmol/L    Glucose 90 70 - 99 mg/dL    Urea Nitrogen 11 7 - 30 mg/dL    Creatinine 0.76 0.52 - 1.04 mg/dL    GFR Estimate >90 >60 mL/min/[1.73_m2]      Comment:      Non  GFR Calc  Starting 12/18/2018, serum creatinine based estimated GFR (eGFR) will be   calculated using the Chronic Kidney Disease Epidemiology Collaboration   (CKD-EPI) equation.      GFR Estimate If Black >90 >60 mL/min/[1.73_m2]      Comment:       GFR Calc  Starting 12/18/2018, serum creatinine based estimated GFR (eGFR) will be   calculated using the Chronic Kidney Disease Epidemiology Collaboration   (CKD-EPI) equation.      Calcium 9.6 8.5 - 10.1 mg/dL    Bilirubin Total 0.7 0.2 - 1.3 mg/dL    Albumin 4.1 3.4 - 5.0 g/dL    Protein Total 7.7 6.8 - 8.8 g/dL    Alkaline Phosphatase 92 40 - 150 U/L    ALT 28 0 - 50 U/L    AST 17 0 - 45 U/L       If you have any questions or concerns, please call the clinic at the number listed above.       Sincerely,        Shanthi Vargas PA-C

## 2020-08-11 NOTE — TELEPHONE ENCOUNTER
Today hands swollen, felt heavy, tingling feeling.  Noted that veins became more pronounced.  Pain.  Hands became purple-red. Then went back to normal color.  First occurred last week on Wednesday.  Constantly hurting since then. Dull pain.     Requesting to be seen as she has a lot of anxiety. RN scheduled pt in to clinic today.    This is an FYI for provider seeing pt today    Reason for Disposition    MODERATE hand swelling (e.g., visible swelling of hand and fingers; pitting edema)    Additional Information    Negative: Severe difficulty breathing (e.g., struggling for each breath, speaks in single words)    Negative: Sounds like a life-threatening emergency to the triager    Negative: Followed a hand injury    Negative: Swelling followed an insect bite to the area    Negative: Swelling followed an bee, wasp, or other sting to the area    Negative: Swelling of arm is main problem    Negative: Swelling of wrist is main symptom    Negative: Cast problems or questions    Negative: Pain, redness, swelling, or pus at IV Site    Negative: Difficulty breathing at rest    Negative: Looks like a broken bone or dislocated joint (e.g., crooked or deformed)    Negative: Entire hand is cool or blue in comparison to other side    Negative: [1] Can't use hand or can barely use hand AND [2] new onset    Negative: [1] Difficulty breathing with exertion (e.g., walking) AND [2] new onset or worsening    Negative: [1] Red area or streak AND [2] fever    Negative: [1] Swelling is painful to touch AND [2] fever    Negative: [1] Cast arm AND [2] now increased pain    Negative: [1] Postpartum (i.e. < 6 weeks since delivery) AND [2] new blurred vision or vision changes    Negative: [1] Postpartum (i.e. < 6 weeks since delivery) AND [2] face swelling    Negative: Patient sounds very sick or weak to the triager    Negative: [1] Pregnant > 20 weeks AND [2] face swelling    Negative: [1] Pregnant > 20 weeks AND [2] new blurred vision or  "vision changes    Negative: SEVERE hand swelling (e.g., swelling of entire hand and up into forearm)    Negative: [1] Red area or streak [2] large (> 2 in. or 5 cm)    Answer Assessment - Initial Assessment Questions  1. ONSET: \"When did the swelling start?\" (e.g., minutes, hours, days)      Last Wednesday    2. LOCATION: \"What part of the hand is swollen?\"  \"Are both hands swollen or just one hand?\"      From elbow down bilat- now it is only L hand    3. SEVERITY: \"How bad is the swelling?\" (e.g., localized; mild, moderate, severe)    - BALL OR LUMP: small ball or lump    - LOCALIZED: puffy or swollen area or patch of skin    - JOINT SWELLING: swelling of a joint    - MILD: puffiness or mild swelling of fingers or hand    - MODERATE: fingers and hand are swollen    - SEVERE: swelling of entire hand and up into forearm      Mild to moderate swelling at this time    4. REDNESS: \"Does the swelling look red or infected?\"      No    5. PAIN: \"Is the swelling painful to touch?\" If so, ask: \"How painful is it?\"   (Scale 1-10; mild, moderate or severe)      Moderate    6. FEVER: \"Do you have a fever?\" If so, ask: \"What is it, how was it measured, and when did it start?\"       No    7. CAUSE: \"What do you think is causing the hand swelling?\" (e.g., heat, insect bite, pregnancy, recent injury)      Unsure    8. MEDICAL HISTORY: \"Do you have a history of heart failure, kidney disease, liver failure, or cancer?\"      No    9. RECURRENT SYMPTOM: \"Have you had hand swelling before?\" If so, ask: \"When was the last time?\" \"What happened that time?\"      No    10. OTHER SYMPTOMS: \"Do you have any other symptoms?\" (e.g., blurred vision, difficulty breathing, headache)        No    11. PREGNANCY: \"Is there any chance you are pregnant?\" \"When was your last menstrual period?\"        No    Protocols used: HAND SWELLING-A-AH      "

## 2020-08-11 NOTE — PROGRESS NOTES
Subjective     Shila Batres is a 35 year old female who presents to clinic today for the following health issues:    HPI       Musculoskeletal problem/pain      Duration: x1 week    Description    Location: left hand(started in left hands from elbow to hand)    Intensity:  6/10 hurts when touched    Accompanying signs and symptoms: tingling and purple reddish marks, cramping sensation, spider web veins and is painful    History  Previous similar problem: no   Previous evaluation:  none    Precipitating or alleviating factors:  Trauma or overuse: no   Aggravating factors include: does not matter what she is doing the pain is constant    Therapies tried and outcome: Ibuprofen (with no relief) and and putting arms up helps with taking the tingling feeling away.    Patient notes she was walking when her left arm became swollen, had red skin lesions, and felt tingling and numb. She had no known exposure. The episode lasted less than an hour.      Reviewed and updated as needed this visit by Provider  Meds  Problems               Objective    /80 (BP Location: Right arm, Patient Position: Chair, Cuff Size: Adult Regular)   Pulse 80   Temp 97.8  F (36.6  C) (Oral)   Resp 16   Wt 71.1 kg (156 lb 11.2 oz)   SpO2 98%   BMI 30.60 kg/m    Body mass index is 30.6 kg/m .  Physical Exam   GENERAL: healthy, alert and no distress  MS: left arm examined with no swelling or skin changes, normal temperature, tenderness to the thenar area of thumb and the MCP joint of thumb, negative finkelstein, Phalen and tinel    Diagnostic Test Results:  Labs reviewed in Epic        Assessment & Plan     1. Thumb pain, left  - suspect initial reaction was a possible allergic reaction, offered allergy referral pt decline, follow up if recurrence occurs   - lasting pain thought to be MSC issue, referral to sport's medicine   - Orthopedic & Spine  Referral; Future    2. Benign essential hypertension  - due for blood pressure  follow up, normal reading today and lab follow up due   - losartan (COZAAR) 50 MG tablet; Take 1 tablet (50 mg) by mouth daily  Dispense: 90 tablet; Refill: 1  - Comprehensive metabolic panel (BMP + Alb, Alk Phos, ALT, AST, Total. Bili, TP)    No follow-ups on file.    Shanthi Vargas PA-C  [facility]

## 2020-08-13 ENCOUNTER — OFFICE VISIT (OUTPATIENT)
Dept: ORTHOPEDICS | Facility: CLINIC | Age: 35
End: 2020-08-13
Payer: COMMERCIAL

## 2020-08-13 ENCOUNTER — ANCILLARY PROCEDURE (OUTPATIENT)
Dept: GENERAL RADIOLOGY | Facility: CLINIC | Age: 35
End: 2020-08-13
Attending: FAMILY MEDICINE
Payer: COMMERCIAL

## 2020-08-13 DIAGNOSIS — S66.412A STRAIN OF INTRINSIC MUSCLE OF LEFT THUMB: Primary | ICD-10-CM

## 2020-08-13 DIAGNOSIS — M79.645 PAIN OF LEFT THUMB: ICD-10-CM

## 2020-08-13 DIAGNOSIS — M79.645 THUMB PAIN, LEFT: ICD-10-CM

## 2020-08-13 PROCEDURE — 99204 OFFICE O/P NEW MOD 45 MIN: CPT | Performed by: FAMILY MEDICINE

## 2020-08-13 PROCEDURE — 73140 X-RAY EXAM OF FINGER(S): CPT | Mod: LT | Performed by: FAMILY MEDICINE

## 2020-08-13 NOTE — LETTER
8/13/2020         RE: Shila Batres  [address] [facility] [address]        Dear Colleague,    Thank you for referring your patient, Shila Batres, to the  SPORTS MEDICINE. Please see a copy of my visit note below.    [facility]   Clinic Visit s Aug 13, 2020    PCP: Kassandra Dey    ASSESSMENT/PLAN    ICD-10-CM    1. Strain of intrinsic muscle of left thumb  S66.412A    2. Thumb pain, left  M79.645 Orthopedic & Spine  Referral     order for DME        Unclear with vascular and sensory changes that she had transiently at onset, but upon further history she recalls working out that day with weights and working in her garden, suggesting muscular strain which is most evident on exam.  Anticipate resolution over 1 to 2 weeks.  She will monitor for further vascular changes.  A thumb spica splint provided significant comfort.  Encouraged her to use this periodically during the day but also remove and regularly do range of motion activities to avoid stiffness.        Today's Visit:  Shila is a 35 year old female who is seen in consultation at the request of Dr. Jose Vargas for   Chief Complaint   Patient presents with     Left Thumb - Pain       Injury: Reports insidious onset without acute precipitating event. Sudden onset swelling, bruising, tingling in both hands, resolved with elevation after 10 minutes, but left with cramping pain around LEFT thumb..      Right hand dominant    Location of Pain: left hand thumb, nonradiating   Duration of Pain: acute, 1 week(s),   Rating of Pain at worst: 10/10  Rating of Pain Currently: 0/10  Pain is better with: nothing   Pain is worse with: stretching and touching the base of thumb  Treatment so far consists of: ice, ibuprofen and tylenol  Associated symptoms: swelling Mild  Recent imaging completed: X-rays completed .  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        Past Medical History:   Diagnosis Date     Benign essential hypertension      Obesity (BMI 30-39.9)        Patient Active Problem List    Diagnosis Date Noted     Obesity (BMI 30-39.9) 10/25/2018     Priority: Medium     Benign essential hypertension 10/25/2018     Priority: Medium       Family History   Problem Relation Age of Onset     Diabetes Type 2  Mother      Hypertension Mother      Hyperlipidemia Mother      Lupus Sister      Unknown/Adopted Father      Myocardial Infarction No family hx of      Cerebrovascular Disease No family hx of      Coronary Artery Disease Early Onset No family hx of      Breast Cancer No family hx of      Ovarian Cancer No family hx of      Colon Cancer No family hx of        Social History     Socioeconomic History     Marital status: Single     Spouse name: Berto     Number of children: 3     Years of education: Not on file     Highest education level: Not on file   Occupational History     Occupation: Payroll   Social Needs     Financial resource strain: Not on file     Food insecurity     Worry: Not on file     Inability: Not on file     Transportation needs     Medical: Not on file     Non-medical: Not on file   Tobacco Use     Smoking status: Never Smoker     Smokeless tobacco: Never Used   Substance and Sexual Activity     Alcohol use: Yes     Alcohol/week: 0.0 standard drinks     Comment: 2-3 drinks, 2x/month     Past Surgical History:   Procedure Laterality Date     TYMPANOPLASTY Left 1996         Review of Systems   Musculoskeletal: Positive for joint pain.   All other systems reviewed and are negative.        Physical Exam    There were no vitals taken for this visit.  Constitutional:well-developed, well-nourished, and in no distress.   Cardiovascular: Intact distal pulses.    Neurological: alert. Gait Normal:   Gait, station, stance, and balance appear normal for age  Skin: Skin is warm and dry.   Psychiatric: Mood and affect normal.   Respiratory: unlabored, speaks  in full sentences  Lymph: no LAD, no lymphangitis          Left Hand Exam     Tenderness   Left hand tenderness location: Thenar region, mild.     Range of Motion   Wrist   Extension: normal   Flexion: normal   Pronation: normal   Supination: normal     Muscle Strength   The patient has normal left wrist strength.    Tests   Phalen s Sign: negative  Tinel's sign (median nerve): negative  Finkelstein's test: negative    Other   Erythema: absent  Scars: absent  Sensation: normal  Pulse: present    Comments:  No unusual vascularity, swelling, redness, unusual warmth, or other deformities.  Not tender specifically over CMC joint of thumb                  X-ray images Ordered and independently reviewed by me in the office today with the patient. X-ray shows:   Recent Results (from the past 48 hour(s))   XR Finger LT G/E 2 vw    Narrative    8/13/2020    Normal alignment. No fractures, dislocation or lesions. No soft tissue   abnormalities.         Again, thank you for allowing me to participate in the care of your patient.        Sincerely,        Luis Bagley MD

## 2020-08-13 NOTE — PROGRESS NOTES
[facility]   Clinic Visit s Aug 13, 2020    PCP: Kassandra Dey    ASSESSMENT/PLAN    ICD-10-CM    1. Strain of intrinsic muscle of left thumb  S66.412A    2. Thumb pain, left  M79.645 Orthopedic & Spine  Referral     order for DME        Unclear with vascular and sensory changes that she had transiently at onset, but upon further history she recalls working out that day with weights and working in her garden, suggesting muscular strain which is most evident on exam.  Anticipate resolution over 1 to 2 weeks.  She will monitor for further vascular changes.  A thumb spica splint provided significant comfort.  Encouraged her to use this periodically during the day but also remove and regularly do range of motion activities to avoid stiffness.        Today's Visit:  Shila is a 35 year old female who is seen in consultation at the request of Dr. Jose Vargas for   Chief Complaint   Patient presents with     Left Thumb - Pain       Injury: Reports insidious onset without acute precipitating event. Sudden onset swelling, bruising, tingling in both hands, resolved with elevation after 10 minutes, but left with cramping pain around LEFT thumb..      Right hand dominant    Location of Pain: left hand thumb, nonradiating   Duration of Pain: acute, 1 week(s),   Rating of Pain at worst: 10/10  Rating of Pain Currently: 0/10  Pain is better with: nothing   Pain is worse with: stretching and touching the base of thumb  Treatment so far consists of: ice, ibuprofen and tylenol  Associated symptoms: swelling Mild  Recent imaging completed: X-rays completed .  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       Past Medical History:   Diagnosis Date     Benign essential hypertension      Obesity (BMI 30-39.9)        Patient Active Problem List    Diagnosis Date Noted     Obesity (BMI 30-39.9) 10/25/2018     Priority: Medium     Benign  essential hypertension 10/25/2018     Priority: Medium       Family History   Problem Relation Age of Onset     Diabetes Type 2  Mother      Hypertension Mother      Hyperlipidemia Mother      Lupus Sister      Unknown/Adopted Father      Myocardial Infarction No family hx of      Cerebrovascular Disease No family hx of      Coronary Artery Disease Early Onset No family hx of      Breast Cancer No family hx of      Ovarian Cancer No family hx of      Colon Cancer No family hx of        Social History     Socioeconomic History     Marital status: Single     Spouse name: Berto     Number of children: 3     Years of education: Not on file     Highest education level: Not on file   Occupational History     Occupation: Payroll   Social Needs     Financial resource strain: Not on file     Food insecurity     Worry: Not on file     Inability: Not on file     Transportation needs     Medical: Not on file     Non-medical: Not on file   Tobacco Use     Smoking status: Never Smoker     Smokeless tobacco: Never Used   Substance and Sexual Activity     Alcohol use: Yes     Alcohol/week: 0.0 standard drinks     Comment: 2-3 drinks, 2x/month     Past Surgical History:   Procedure Laterality Date     TYMPANOPLASTY Left 1996         Review of Systems   Musculoskeletal: Positive for joint pain.   All other systems reviewed and are negative.        Physical Exam    There were no vitals taken for this visit.  Constitutional:well-developed, well-nourished, and in no distress.   Cardiovascular: Intact distal pulses.    Neurological: alert. Gait Normal:   Gait, station, stance, and balance appear normal for age  Skin: Skin is warm and dry.   Psychiatric: Mood and affect normal.   Respiratory: unlabored, speaks in full sentences  Lymph: no LAD, no lymphangitis          Left Hand Exam     Tenderness   Left hand tenderness location: Thenar region, mild.     Range of Motion   Wrist   Extension: normal   Flexion: normal   Pronation: normal    Supination: normal     Muscle Strength   The patient has normal left wrist strength.    Tests   Phalen s Sign: negative  Tinel's sign (median nerve): negative  Finkelstein's test: negative    Other   Erythema: absent  Scars: absent  Sensation: normal  Pulse: present    Comments:  No unusual vascularity, swelling, redness, unusual warmth, or other deformities.  Not tender specifically over CMC joint of thumb                  X-ray images Ordered and independently reviewed by me in the office today with the patient. X-ray shows:   Recent Results (from the past 48 hour(s))   XR Finger LT G/E 2 vw    Narrative    8/13/2020    Normal alignment. No fractures, dislocation or lesions. No soft tissue   abnormalities.

## 2021-02-02 DIAGNOSIS — I10 BENIGN ESSENTIAL HYPERTENSION: ICD-10-CM

## 2021-02-03 RX ORDER — LOSARTAN POTASSIUM 50 MG/1
TABLET ORAL
Qty: 90 TABLET | Refills: 1 | Status: SHIPPED | OUTPATIENT
Start: 2021-02-03 | End: 2021-08-05

## 2021-03-11 ENCOUNTER — OFFICE VISIT (OUTPATIENT)
Dept: URGENT CARE | Facility: URGENT CARE | Age: 36
End: 2021-03-11
Payer: COMMERCIAL

## 2021-03-11 VITALS
HEART RATE: 99 BPM | DIASTOLIC BLOOD PRESSURE: 80 MMHG | SYSTOLIC BLOOD PRESSURE: 122 MMHG | RESPIRATION RATE: 16 BRPM | TEMPERATURE: 97.7 F | BODY MASS INDEX: 30.08 KG/M2 | OXYGEN SATURATION: 100 % | WEIGHT: 154 LBS

## 2021-03-11 DIAGNOSIS — H93.8X2 PRESSURE SENSATION IN LEFT EAR: Primary | ICD-10-CM

## 2021-03-11 DIAGNOSIS — H69.92 DYSFUNCTION OF LEFT EUSTACHIAN TUBE: ICD-10-CM

## 2021-03-11 PROCEDURE — 99214 OFFICE O/P EST MOD 30 MIN: CPT | Performed by: PHYSICIAN ASSISTANT

## 2021-03-11 RX ORDER — FLUTICASONE PROPIONATE 50 MCG
1 SPRAY, SUSPENSION (ML) NASAL DAILY
Qty: 16 G | Refills: 0 | Status: SHIPPED | OUTPATIENT
Start: 2021-03-11 | End: 2023-06-21

## 2021-03-11 RX ORDER — IBUPROFEN 600 MG/1
600 TABLET, FILM COATED ORAL EVERY 6 HOURS PRN
Qty: 30 TABLET | Refills: 0 | Status: SHIPPED | OUTPATIENT
Start: 2021-03-11 | End: 2021-11-12

## 2021-03-11 RX ORDER — CETIRIZINE HYDROCHLORIDE, PSEUDOEPHEDRINE HYDROCHLORIDE 5; 120 MG/1; MG/1
1 TABLET, FILM COATED, EXTENDED RELEASE ORAL 2 TIMES DAILY
Qty: 28 TABLET | Refills: 0 | Status: SHIPPED | OUTPATIENT
Start: 2021-03-11 | End: 2021-11-12

## 2021-03-11 NOTE — PROGRESS NOTES
Assessment & Plan     Pressure sensation in left ear  ETD dysfuction present  Trial with flonase, advil and zyrtec D  Monitor blood pressure  - fluticasone (FLONASE) 50 MCG/ACT nasal spray; Spray 1 spray into both nostrils daily  - ibuprofen (ADVIL/MOTRIN) 600 MG tablet; Take 1 tablet (600 mg) by mouth every 6 hours as needed  - cetirizine-pseudoePHEDrine ER (ZYRTEC-D) 5-120 MG 12 hr tablet; Take 1 tablet by mouth 2 times daily    Dysfunction of left eustachian tube  Flonase, motrin, zyrtec D for ETD  Monitor blood pressure  Follow up with ENT if symptoms persist  - fluticasone (FLONASE) 50 MCG/ACT nasal spray; Spray 1 spray into both nostrils daily  - ibuprofen (ADVIL/MOTRIN) 600 MG tablet; Take 1 tablet (600 mg) by mouth every 6 hours as needed  - cetirizine-pseudoePHEDrine ER (ZYRTEC-D) 5-120 MG 12 hr tablet; Take 1 tablet by mouth 2 times daily    Review of external notes as documented elsewhere in note         BMI:   Estimated body mass index is 30.08 kg/m  as calculated from the following:    Height as of 5/14/20: 1.524 m (5').    Weight as of this encounter: 69.9 kg (154 lb).       CONSULTATION/REFERRAL to ENT if symptoms persist      Robles Rodriguez PA-C  [facility]   Shila is a 35 year old who presents for the following health issues  accompanied by her daughter:    HPI     Left ear pressure  Crackling/popping in left ear  Feels like this when flying    Review of Systems   Constitutional, HEENT, cardiovascular, pulmonary, gi and gu systems are negative, except as otherwise noted.      Objective    /80   Pulse 99   Temp 97.7  F (36.5  C) (Tympanic)   Resp 16   Wt 69.9 kg (154 lb)   SpO2 100%   BMI 30.08 kg/m    Body mass index is 30.08 kg/m .  Physical Exam   GENERAL: healthy, alert and no distress  EYES: Eyes grossly normal to inspection, PERRL and conjunctivae and sclerae normal  EYES: Eyes grossly normal to inspection  HENT: ear canals and TM's  normal, nose and mouth without ulcers or lesions  NECK: no adenopathy, no asymmetry, masses, or scars and thyroid normal to palpation  RESP: lungs clear to auscultation - no rales, rhonchi or wheezes  CV: regular rate and rhythm, normal S1 S2, no S3 or S4, no murmur, click or rub, no peripheral edema and peripheral pulses strong  MS: no gross musculoskeletal defects noted, no edema  SKIN: no suspicious lesions or rashes  NEURO: Normal strength and tone, mentation intact and speech normal

## 2021-05-13 ENCOUNTER — IMMUNIZATION (OUTPATIENT)
Dept: NURSING | Facility: CLINIC | Age: 36
End: 2021-05-13
Payer: COMMERCIAL

## 2021-05-13 PROCEDURE — 91300 PR COVID VAC PFIZER DIL RECON 30 MCG/0.3 ML IM: CPT

## 2021-05-13 PROCEDURE — 0001A PR COVID VAC PFIZER DIL RECON 30 MCG/0.3 ML IM: CPT

## 2021-06-03 ENCOUNTER — IMMUNIZATION (OUTPATIENT)
Dept: NURSING | Facility: CLINIC | Age: 36
End: 2021-06-03
Attending: INTERNAL MEDICINE
Payer: COMMERCIAL

## 2021-06-03 PROCEDURE — 0002A PR COVID VAC PFIZER DIL RECON 30 MCG/0.3 ML IM: CPT

## 2021-06-03 PROCEDURE — 91300 PR COVID VAC PFIZER DIL RECON 30 MCG/0.3 ML IM: CPT

## 2021-08-05 ENCOUNTER — OFFICE VISIT (OUTPATIENT)
Dept: URGENT CARE | Facility: URGENT CARE | Age: 36
End: 2021-08-05
Payer: COMMERCIAL

## 2021-08-05 VITALS
DIASTOLIC BLOOD PRESSURE: 81 MMHG | SYSTOLIC BLOOD PRESSURE: 136 MMHG | HEART RATE: 96 BPM | BODY MASS INDEX: 28.9 KG/M2 | RESPIRATION RATE: 16 BRPM | WEIGHT: 148 LBS | OXYGEN SATURATION: 94 % | TEMPERATURE: 98.1 F

## 2021-08-05 DIAGNOSIS — I10 BENIGN ESSENTIAL HYPERTENSION: ICD-10-CM

## 2021-08-05 DIAGNOSIS — H10.13 ALLERGIC CONJUNCTIVITIS, BILATERAL: Primary | ICD-10-CM

## 2021-08-05 PROCEDURE — 99214 OFFICE O/P EST MOD 30 MIN: CPT | Performed by: NURSE PRACTITIONER

## 2021-08-05 RX ORDER — CETIRIZINE HYDROCHLORIDE 10 MG/1
10 TABLET ORAL DAILY
Qty: 30 TABLET | Refills: 0 | Status: SHIPPED | OUTPATIENT
Start: 2021-08-05 | End: 2021-09-04

## 2021-08-05 RX ORDER — FLUTICASONE PROPIONATE 50 MCG
1 SPRAY, SUSPENSION (ML) NASAL DAILY
Qty: 16 G | Refills: 0 | Status: SHIPPED | OUTPATIENT
Start: 2021-08-05 | End: 2021-09-04

## 2021-08-05 RX ORDER — DIPHENHYDRAMINE HCL 25 MG
25 TABLET ORAL
Qty: 30 TABLET | Refills: 0 | Status: SHIPPED | OUTPATIENT
Start: 2021-08-05 | End: 2021-09-04

## 2021-08-05 RX ORDER — OLOPATADINE HYDROCHLORIDE 1 MG/ML
1 SOLUTION/ DROPS OPHTHALMIC 2 TIMES DAILY
Qty: 3 ML | Refills: 0 | Status: SHIPPED | OUTPATIENT
Start: 2021-08-05 | End: 2021-09-04

## 2021-08-05 RX ORDER — LOSARTAN POTASSIUM 50 MG/1
50 TABLET ORAL DAILY
Qty: 30 TABLET | Refills: 0 | Status: SHIPPED | OUTPATIENT
Start: 2021-08-05 | End: 2021-11-12

## 2021-08-05 ASSESSMENT — ENCOUNTER SYMPTOMS
CHEST TIGHTNESS: 0
SHORTNESS OF BREATH: 0
COUGH: 0
PHOTOPHOBIA: 0
PALPITATIONS: 0
EYE DISCHARGE: 1
RHINORRHEA: 0
FEVER: 0
EYE PAIN: 0
LIGHT-HEADEDNESS: 0
WHEEZING: 0
SLEEP DISTURBANCE: 0
EYE ITCHING: 1
SORE THROAT: 0
DIZZINESS: 0
EYE REDNESS: 0

## 2021-08-05 NOTE — PATIENT INSTRUCTIONS
Take an antihistamine such as Claritin (loratadine), Zyrtec (cetirizine) or Allegra (fexofenadine) daily for allergy symptoms.  Start over an over the counter antihistamine eye drop such as Zaditor or Alaway (ketotifen) every 8-12 hours as directed on box  Patanol eye drops Rx 1 drop once daily  Cool packs are soothing and may help reduce swelling.  Avoid allergen triggers if determined.   Please follow up with primary care provider if not improving, worsening or new symptoms or for any adverse reactions to medications.    BP med refilled per request  Follow-up with primary care provider for further management

## 2021-08-05 NOTE — PROGRESS NOTES
Chief Complaint   Patient presents with     Urgent Care     eye irratation in both eyes     SUBJECTIVE:  Shila Batres is a 36 year old female who presents to the clinic today with itching irritated eyes and watery drainage for 2 days. She does have seasonal allergies with fullness in ears and runny nose. Denies pain, vision change, thick goup, crust or eye conditions. She is requesting all recommended meds be prescribed rather than picked up over the counter. She also is requesting losartan refill as she has been out for 3 days. No symptoms such as blurred vision, palpitations, dizziness.    Past Medical History:   Diagnosis Date     Benign essential hypertension      Obesity (BMI 30-39.9)      Blood Pressure Monitor KIT, Check blood pressure daily  cetirizine-pseudoePHEDrine ER (ZYRTEC-D) 5-120 MG 12 hr tablet, Take 1 tablet by mouth 2 times daily  fluticasone (FLONASE) 50 MCG/ACT nasal spray, Spray 1 spray into both nostrils daily  ibuprofen (ADVIL/MOTRIN) 600 MG tablet, Take 1 tablet (600 mg) by mouth every 6 hours as needed  order for DME, Equipment being ordered: thumb spica wrist brace left    No current facility-administered medications on file prior to visit.    Social History     Tobacco Use     Smoking status: Never Smoker     Smokeless tobacco: Never Used   Substance Use Topics     Alcohol use: Yes     Alcohol/week: 0.0 standard drinks     Comment: 2-3 drinks, 2x/month     Allergies   Allergen Reactions     Nkda [No Known Drug Allergies]      Review of Systems   Constitutional: Negative for fever.   HENT: Negative for congestion, ear pain, rhinorrhea, sneezing and sore throat.    Eyes: Positive for discharge and itching. Negative for photophobia, pain, redness and visual disturbance.   Respiratory: Negative for cough, chest tightness, shortness of breath and wheezing.    Cardiovascular: Negative for chest pain, palpitations and peripheral edema.   Skin: Negative for rash.   Allergic/Immunologic:  Positive for environmental allergies.   Neurological: Negative for dizziness and light-headedness.   Psychiatric/Behavioral: Negative for sleep disturbance.     EXAM:   /81   Pulse 96   Temp 98.1  F (36.7  C)   Resp 16   Wt 67.1 kg (148 lb)   SpO2 94%   BMI 28.90 kg/m      Physical Exam  Constitutional:       General: She is not in acute distress.     Appearance: She is well-developed. She is not diaphoretic.   HENT:      Head: Normocephalic and atraumatic.      Right Ear: External ear normal.      Left Ear: External ear normal.      Nose: Nose normal.   Eyes:      General:         Right eye: No discharge.         Left eye: No discharge.      Extraocular Movements: Extraocular movements intact.      Conjunctiva/sclera: Conjunctivae normal.      Pupils: Pupils are equal, round, and reactive to light.   Cardiovascular:      Pulses: Normal pulses.   Pulmonary:      Effort: Pulmonary effort is normal.   Musculoskeletal:         General: No tenderness. Normal range of motion.      Cervical back: Normal range of motion and neck supple.   Lymphadenopathy:      Cervical: No cervical adenopathy.   Skin:     General: Skin is warm and dry.      Findings: No rash.   Neurological:      Mental Status: She is alert and oriented to person, place, and time.   Psychiatric:         Behavior: Behavior normal.       ASSESSMENT:    ICD-10-CM    1. Allergic conjunctivitis, bilateral  H10.13 olopatadine (PATANOL) 0.1 % ophthalmic solution     cetirizine (ZYRTEC) 10 MG tablet     diphenhydrAMINE (BENADRYL) 25 MG tablet     fluticasone (FLONASE) 50 MCG/ACT nasal spray   2. Benign essential hypertension  I10 losartan (COZAAR) 50 MG tablet     PLAN:  Patient Instructions   Take an antihistamine such as Claritin (loratadine), Zyrtec (cetirizine) or Allegra (fexofenadine) daily for allergy symptoms.  Start over an over the counter antihistamine eye drop such as Zaditor or Alaway (ketotifen) every 8-12 hours as directed on  box  Patanol eye drops Rx 1 drop once daily  Cool packs are soothing and may help reduce swelling.  Avoid allergen triggers if determined.   Please follow up with primary care provider if not improving, worsening or new symptoms or for any adverse reactions to medications.    BP med refilled per request  Follow-up with primary care provider for further management    Follow up with primary care provider with any problems, questions or concerns or if symptoms worsen or fail to improve. Patient agreed to plan and verbalized understanding.    Chiara Gallegos, CHLOE-BC  [facility]

## 2021-09-26 DIAGNOSIS — I10 BENIGN ESSENTIAL HYPERTENSION: ICD-10-CM

## 2021-09-26 NOTE — LETTER
[facility]  [address] [address]  [phone]  October 13, 2021  Shila Batres  [address] [facility] [facility] [address]    Dear Shila,    I am contacting you regarding the refill request we received for you. After reviewing your chart it looks like you are overdue for your annual and for a med check. Please call [phone] or schedule this through my chart to continue to receive refills. If you anticipate running out before your appointment let us know and we can send in a isaiah refill.       Thank you,     [facility] nursing staff

## 2021-09-28 RX ORDER — LOSARTAN POTASSIUM 50 MG/1
TABLET ORAL
Qty: 30 TABLET | Refills: 0 | OUTPATIENT
Start: 2021-09-28

## 2021-09-28 NOTE — TELEPHONE ENCOUNTER
Routing refill request to provider for review/approval because:  Labs not current:    Creatinine   Date Value Ref Range Status   08/11/2020 0.76 0.52 - 1.04 mg/dL Final     Potassium   Date Value Ref Range Status   08/11/2020 4.2 3.4 - 5.3 mmol/L Final   Davina Romeo RN

## 2021-09-28 NOTE — TELEPHONE ENCOUNTER
She is overdue for her annual exam and to establish care with a new primary. Please ask her to schedule this appointment ASAP. Can refill medication temporarily if she anticipates running out prior to scheduled appointment.     Thank you.

## 2021-11-12 ENCOUNTER — OFFICE VISIT (OUTPATIENT)
Dept: INTERNAL MEDICINE | Facility: CLINIC | Age: 36
End: 2021-11-12
Payer: COMMERCIAL

## 2021-11-12 VITALS
HEART RATE: 93 BPM | BODY MASS INDEX: 30.54 KG/M2 | OXYGEN SATURATION: 97 % | TEMPERATURE: 99 F | WEIGHT: 156.4 LBS | SYSTOLIC BLOOD PRESSURE: 140 MMHG | DIASTOLIC BLOOD PRESSURE: 100 MMHG

## 2021-11-12 DIAGNOSIS — I10 ESSENTIAL HYPERTENSION: Primary | ICD-10-CM

## 2021-11-12 DIAGNOSIS — Z30.431 IUD CHECK UP: ICD-10-CM

## 2021-11-12 DIAGNOSIS — Z13.1 ENCOUNTER FOR SCREENING FOR DIABETES MELLITUS: ICD-10-CM

## 2021-11-12 LAB
ANION GAP SERPL CALCULATED.3IONS-SCNC: 6 MMOL/L (ref 3–14)
BUN SERPL-MCNC: 12 MG/DL (ref 7–30)
CALCIUM SERPL-MCNC: 9.1 MG/DL (ref 8.5–10.1)
CHLORIDE BLD-SCNC: 108 MMOL/L (ref 94–109)
CO2 SERPL-SCNC: 23 MMOL/L (ref 20–32)
CREAT SERPL-MCNC: 0.71 MG/DL (ref 0.52–1.04)
GFR SERPL CREATININE-BSD FRML MDRD: >90 ML/MIN/1.73M2
GLUCOSE BLD-MCNC: 92 MG/DL (ref 70–99)
HBA1C MFR BLD: 5.4 % (ref 0–5.6)
POTASSIUM BLD-SCNC: 4.7 MMOL/L (ref 3.4–5.3)
SODIUM SERPL-SCNC: 137 MMOL/L (ref 133–144)

## 2021-11-12 PROCEDURE — 36415 COLL VENOUS BLD VENIPUNCTURE: CPT | Performed by: INTERNAL MEDICINE

## 2021-11-12 PROCEDURE — 80048 BASIC METABOLIC PNL TOTAL CA: CPT | Performed by: INTERNAL MEDICINE

## 2021-11-12 PROCEDURE — 83036 HEMOGLOBIN GLYCOSYLATED A1C: CPT | Performed by: INTERNAL MEDICINE

## 2021-11-12 PROCEDURE — 99214 OFFICE O/P EST MOD 30 MIN: CPT | Performed by: INTERNAL MEDICINE

## 2021-11-12 RX ORDER — LOSARTAN POTASSIUM 50 MG/1
50 TABLET ORAL DAILY
Qty: 90 TABLET | Refills: 3 | Status: SHIPPED | OUTPATIENT
Start: 2021-11-12 | End: 2022-08-26

## 2021-11-12 NOTE — PATIENT INSTRUCTIONS
- Our team will contact you via [employer] (if you sign up for it), telephone call (if results are urgent), or otherwise via letter in the mail with the results of today's lab tests once I have a chance to review them    Today we discussed your hypertension (high blood pressure). Four important things to remember about your hypertension:    1) Take all medications as prescribed! Today we discussed your blood pressure medications and decided to continue them. No changes were made. We re-started your losartan.    2) Lose weight! Losing weight is the best thing you can do to lower your blood pressure. Studies have found that for every 2 pounds you lose, your blood pressure will go down by 1 point. Ex: if you lose 10 pounds, your blood pressure should go down by 5 points. That's better than any medication, plus it will impact your health in a number of other positive ways. This may be a good time to make a weight loss goal.    3) Lower your sodium intake! Eating too much salt causes your body to hold onto extra water, which makes your blood pressure higher. Ditching the salt shaker is a good thing, but most of our sodium intake actually comes from pre-packaged foods. Read labels on cans and food packages. The labels tell you how much sodium is in each serving. Make sure that you look at the serving size. If you eat more than the serving size, you have eaten more sodium. Decreasing your sodium intake by more than 1,000mg per day can lower your blood pressure by up to 5 points and can be as effective as starting a blood pressure medication.    4) Check your blood pressure at home! You can buy a home monitor in a drugstore, supermarket pharmacy, or other large store. Not all automated blood pressure machines are created equal. You can find a list of validated blood pressure cuffs (meaning they have been confirmed to give accurate readings) by going to www.validatebp.org. That website does not sell blood pressure cuffs, but  rather it lists the exact models that have been validated to be accurate. Wrist blood pressure cuffs do not provide reliable comparisons to upper arm (brachial) cuffs. Be sure the arm cuff is the right size for your arm. Ask someone to measure around your upper arm. If your upper arm is more than 13 inches around, buy a monitor with a large cuff. To get a correct measurement, the cuff needs to be the right size.    It is important to measure your blood pressure periodically at home in between office visits. The readings you obtain during these blood pressure checks are often more valuable than the readings we obtain in clinic. However, it is even more important to check your blood pressure CORRECTLY at home. Follow these tips.      Check your blood pressure in the early morning (before you eat, drink, or take any medicines) and at one other random time of day. The random time of day does not need to be consistent from day to day.    Put the cuff on your arm. Remove clothes that get in the way of the cuff. Don t roll up your sleeve in a way that s tight around your arm. The cord should go toward your hand. Line it up with the middle of your forearm. The Velcro should attach easily on the cuff. If it doesn t reach, you may need a bigger cuff.    Wait 30 minutes if you have just eaten a lot, had a drink with caffeine or alcohol, used tobacco products, or exercised. Use the restroom if you need to. (Needing to go can raise your BP.)    Rest both feet flat on the floor with your back supported. Rest your arm at heart level on a table or the arm of a chair.    Sit quietly for 5 minutes or more before taking your blood pressure. Avoid talking while your blood pressure is being measured.    Start the monitor. Press the button or squeeze the ball to measure your blood pressure. Write down the time, the measurement, and your pulse.    Wait 2 minutes.    Repeat 2 more times.    Take the average of the readings. That's your  blood pressure.    Lastly, bring your home monitor into the office for us to validate! Compare your blood pressure monitor to the standardized method we use. It's a good idea to do this once per machine or if your machine starts giving you odd readings (suddenly much higher or lower than you're used to).

## 2021-11-12 NOTE — PROGRESS NOTES
Assessment & Plan     Essential hypertension  BP consistently >140/90. Patient open to re-starting medication today. Discussed lifestyle interventions such as weight loss and reducing sodium intake. Will re-initiate losartan as she did tolerate it well. Will check labs today. Encouraged to continue checking BP at home and to let me know via MyChart or f/u visit what those readings are in case we need to further titrate medication to obtain optimal BP control.   - Basic metabolic panel; Future  - losartan (COZAAR) 50 MG tablet; Take 1 tablet (50 mg) by mouth daily    Encounter for screening for diabetes mellitus  Strong family history.  We will screen with BMP and A1c today.  - Hemoglobin A1c; Future    IUD check up  Referral placed for IUD and Pap smear per patient request.  - Ob/Gyn Referral; Future    Return in about 6 months (around 5/12/2022) for Physical Exam.    Geronimo Patrick MD  [facility] is a 36 year old who presents to discuss a couple of health issues. She has a history of high blood pressure for which she was on losartan 50 mg daily previously.  She ran out of that medication and her past provider left the healthcare system and she was unable to obtain a refill.  She would like to go back on this medication.  Her blood pressures have been a bit high since she has been off of it.  Additionally, her sister (who I also take care of) has diabetes and she would like to be screened for that today too.  She would like a referral to an OB/GYN to discuss a Pap smear and IUD.    Review of Systems   Constitutional, cardiovascular, pulmonary, gyn systems are negative, except as otherwise noted.      Objective    BP (!) 140/100   Pulse 93   Temp 99  F (37.2  C) (Tympanic)   Wt 70.9 kg (156 lb 6.4 oz)   LMP  (LMP Unknown)   SpO2 97%   BMI 30.54 kg/m    Body mass index is 30.54 kg/m .     Physical Exam   GENERAL: alert and in no distress.  EYES:  conjunctivae/corneas clear. EOMs grossly intact  HENT: Facies symmetric.  RESP: CTAB, no w/r/r.  CV: RRR, no m/r/g.  GI: NT, ND  SKIN: No significant ulcers, lesions, or rashes on the visualized portions of the skin  NEURO: Alert. Oriented.

## 2022-07-08 ENCOUNTER — HOSPITAL ENCOUNTER (EMERGENCY)
Facility: CLINIC | Age: 37
Discharge: LEFT BEFORE DISCHARGE | End: 2022-07-09
Admitting: EMERGENCY MEDICINE
Payer: COMMERCIAL

## 2022-07-08 VITALS
TEMPERATURE: 98.1 F | HEART RATE: 82 BPM | RESPIRATION RATE: 15 BRPM | HEIGHT: 62 IN | SYSTOLIC BLOOD PRESSURE: 155 MMHG | OXYGEN SATURATION: 100 % | DIASTOLIC BLOOD PRESSURE: 91 MMHG | WEIGHT: 150 LBS | BODY MASS INDEX: 27.6 KG/M2

## 2022-07-08 LAB
BASOPHILS # BLD AUTO: 0 10E3/UL (ref 0–0.2)
BASOPHILS NFR BLD AUTO: 0 %
EOSINOPHIL # BLD AUTO: 0.1 10E3/UL (ref 0–0.7)
EOSINOPHIL NFR BLD AUTO: 2 %
ERYTHROCYTE [DISTWIDTH] IN BLOOD BY AUTOMATED COUNT: 13.1 % (ref 10–15)
HCT VFR BLD AUTO: 39.5 % (ref 35–47)
HGB BLD-MCNC: 13 G/DL (ref 11.7–15.7)
IMM GRANULOCYTES # BLD: 0 10E3/UL
IMM GRANULOCYTES NFR BLD: 0 %
LYMPHOCYTES # BLD AUTO: 2.5 10E3/UL (ref 0.8–5.3)
LYMPHOCYTES NFR BLD AUTO: 30 %
MCH RBC QN AUTO: 31.3 PG (ref 26.5–33)
MCHC RBC AUTO-ENTMCNC: 32.9 G/DL (ref 31.5–36.5)
MCV RBC AUTO: 95 FL (ref 78–100)
MONOCYTES # BLD AUTO: 0.9 10E3/UL (ref 0–1.3)
MONOCYTES NFR BLD AUTO: 11 %
NEUTROPHILS # BLD AUTO: 4.9 10E3/UL (ref 1.6–8.3)
NEUTROPHILS NFR BLD AUTO: 57 %
NRBC # BLD AUTO: 0 10E3/UL
NRBC BLD AUTO-RTO: 0 /100
PLATELET # BLD AUTO: 281 10E3/UL (ref 150–450)
RBC # BLD AUTO: 4.16 10E6/UL (ref 3.8–5.2)
WBC # BLD AUTO: 8.5 10E3/UL (ref 4–11)

## 2022-07-08 PROCEDURE — 93005 ELECTROCARDIOGRAM TRACING: CPT

## 2022-07-08 PROCEDURE — 999N000104 HC STATISTIC NO CHARGE

## 2022-07-08 PROCEDURE — 84703 CHORIONIC GONADOTROPIN ASSAY: CPT | Performed by: EMERGENCY MEDICINE

## 2022-07-08 PROCEDURE — 36415 COLL VENOUS BLD VENIPUNCTURE: CPT | Performed by: EMERGENCY MEDICINE

## 2022-07-08 PROCEDURE — 84484 ASSAY OF TROPONIN QUANT: CPT | Performed by: EMERGENCY MEDICINE

## 2022-07-08 PROCEDURE — 85014 HEMATOCRIT: CPT | Performed by: EMERGENCY MEDICINE

## 2022-07-08 PROCEDURE — 80048 BASIC METABOLIC PNL TOTAL CA: CPT | Performed by: EMERGENCY MEDICINE

## 2022-07-09 LAB
ANION GAP SERPL CALCULATED.3IONS-SCNC: 6 MMOL/L (ref 3–14)
ATRIAL RATE - MUSE: 80 BPM
BUN SERPL-MCNC: 12 MG/DL (ref 7–30)
CALCIUM SERPL-MCNC: 8.9 MG/DL (ref 8.5–10.1)
CHLORIDE BLD-SCNC: 106 MMOL/L (ref 94–109)
CO2 SERPL-SCNC: 27 MMOL/L (ref 20–32)
CREAT SERPL-MCNC: 0.64 MG/DL (ref 0.52–1.04)
DIASTOLIC BLOOD PRESSURE - MUSE: NORMAL MMHG
GFR SERPL CREATININE-BSD FRML MDRD: >90 ML/MIN/1.73M2
GLUCOSE BLD-MCNC: 92 MG/DL (ref 70–99)
HCG SERPL QL: NEGATIVE
HOLD SPECIMEN: NORMAL
INTERPRETATION ECG - MUSE: NORMAL
P AXIS - MUSE: 29 DEGREES
POTASSIUM BLD-SCNC: 4 MMOL/L (ref 3.4–5.3)
PR INTERVAL - MUSE: 122 MS
QRS DURATION - MUSE: 86 MS
QT - MUSE: 372 MS
QTC - MUSE: 429 MS
R AXIS - MUSE: 41 DEGREES
SODIUM SERPL-SCNC: 139 MMOL/L (ref 133–144)
SYSTOLIC BLOOD PRESSURE - MUSE: NORMAL MMHG
T AXIS - MUSE: 37 DEGREES
TROPONIN I SERPL HS-MCNC: 3 NG/L
VENTRICULAR RATE- MUSE: 80 BPM

## 2022-07-09 NOTE — ED NOTES
Patient stated intent to leave without being seen after receiving results through [facility].  Patient informed that though they received the results, they still need to be seen by a provider, since they are preliminary results, and the provider may need to order more tests to better evaluate the patient's medical complaint. Patient stated they understood there are risks of leaving prior to being seen.

## 2022-08-26 DIAGNOSIS — I10 ESSENTIAL HYPERTENSION: ICD-10-CM

## 2022-08-26 RX ORDER — LOSARTAN POTASSIUM 50 MG/1
50 TABLET ORAL DAILY
Qty: 90 TABLET | Refills: 1 | Status: SHIPPED | OUTPATIENT
Start: 2022-08-26 | End: 2023-03-29

## 2022-11-21 ENCOUNTER — HEALTH MAINTENANCE LETTER (OUTPATIENT)
Age: 37
End: 2022-11-21

## 2023-03-23 DIAGNOSIS — I10 ESSENTIAL HYPERTENSION: ICD-10-CM

## 2023-03-23 RX ORDER — LOSARTAN POTASSIUM 50 MG/1
50 TABLET ORAL DAILY
Qty: 90 TABLET | Refills: 0 | OUTPATIENT
Start: 2023-03-23

## 2023-03-29 ENCOUNTER — OFFICE VISIT (OUTPATIENT)
Dept: PEDIATRICS | Facility: CLINIC | Age: 38
End: 2023-03-29
Payer: COMMERCIAL

## 2023-03-29 VITALS
OXYGEN SATURATION: 99 % | WEIGHT: 152.6 LBS | RESPIRATION RATE: 18 BRPM | BODY MASS INDEX: 27.04 KG/M2 | DIASTOLIC BLOOD PRESSURE: 82 MMHG | SYSTOLIC BLOOD PRESSURE: 140 MMHG | HEART RATE: 100 BPM | TEMPERATURE: 98.1 F | HEIGHT: 63 IN

## 2023-03-29 DIAGNOSIS — I10 ESSENTIAL HYPERTENSION: Primary | ICD-10-CM

## 2023-03-29 LAB
ANION GAP SERPL CALCULATED.3IONS-SCNC: 10 MMOL/L (ref 7–15)
BUN SERPL-MCNC: 10.1 MG/DL (ref 6–20)
CALCIUM SERPL-MCNC: 9.2 MG/DL (ref 8.6–10)
CHLORIDE SERPL-SCNC: 104 MMOL/L (ref 98–107)
CREAT SERPL-MCNC: 0.76 MG/DL (ref 0.51–0.95)
DEPRECATED HCO3 PLAS-SCNC: 25 MMOL/L (ref 22–29)
GFR SERPL CREATININE-BSD FRML MDRD: >90 ML/MIN/1.73M2
GLUCOSE SERPL-MCNC: 96 MG/DL (ref 70–99)
POTASSIUM SERPL-SCNC: 4.4 MMOL/L (ref 3.4–5.3)
SODIUM SERPL-SCNC: 139 MMOL/L (ref 136–145)

## 2023-03-29 PROCEDURE — 99213 OFFICE O/P EST LOW 20 MIN: CPT | Performed by: PHYSICIAN ASSISTANT

## 2023-03-29 PROCEDURE — 80048 BASIC METABOLIC PNL TOTAL CA: CPT | Performed by: PHYSICIAN ASSISTANT

## 2023-03-29 PROCEDURE — 36415 COLL VENOUS BLD VENIPUNCTURE: CPT | Performed by: PHYSICIAN ASSISTANT

## 2023-03-29 RX ORDER — LOSARTAN POTASSIUM 50 MG/1
50 TABLET ORAL DAILY
Qty: 90 TABLET | Refills: 1 | Status: SHIPPED | OUTPATIENT
Start: 2023-03-29 | End: 2023-06-21

## 2023-03-29 ASSESSMENT — PAIN SCALES - GENERAL: PAINLEVEL: NO PAIN (0)

## 2023-03-29 NOTE — PROGRESS NOTES
"  Assessment & Plan     Essential hypertension    - losartan (COZAAR) 50 MG tablet; Take 1 tablet (50 mg) by mouth daily  - Basic metabolic panel  (Ca, Cl, CO2, Creat, Gluc, K, Na, BUN); Future  - Basic metabolic panel  (Ca, Cl, CO2, Creat, Gluc, K, Na, BUN)    Patient was here today for a renewal of her BP medication.  She has been out of it for about one week, therefore pressure was mildly elevated today.  She is otherwise doing well.  Labs ordered and patient advised to followup in 6 months, sooner if needed.     Patient plan:  I have renewed your medication for the next 6 months.      Please be sure to schedule a physical with your doctor to be done in the next 6 months for more refills.      Labs done today as well.               BMI:   Estimated body mass index is 27.03 kg/m  as calculated from the following:    Height as of this encounter: 1.6 m (5' 3\").    Weight as of this encounter: 69.2 kg (152 lb 9.6 oz).       See Patient Instructions    Vangie Mackay PA-C  [facility] SIDDHARTH Fuchs is a 38 year old, presenting for the following health issues:  Recheck Medication  No flowsheet data found.  History of Present Illness       Hypertension: She presents for follow up of hypertension.  She does not check blood pressure  regularly outside of the clinic. Outpatient blood pressures have not been over 140/90. She follows a low salt diet.     She eats 2-3 servings of fruits and vegetables daily.She consumes 3 sweetened beverage(s) daily.She exercises with enough effort to increase her heart rate 30 to 60 minutes per day.  She exercises with enough effort to increase her heart rate 4 days per week.   She is taking medications regularly.       Patient is here today for a renewal of her BP medication.  She has been without it for about one week.  She says she takes it regularly and tolerates it well.  She does not have concerns today.      Review of Systems   Constitutional, HEENT, " "cardiovascular, pulmonary, gi and gu systems are negative, except as otherwise noted.      Objective    BP (!) 140/82 (BP Location: Right arm, Patient Position: Sitting, Cuff Size: Adult Regular)   Pulse 100   Temp 98.1  F (36.7  C) (Temporal)   Resp 18   Ht 1.6 m (5' 3\")   Wt 69.2 kg (152 lb 9.6 oz)   SpO2 99%   BMI 27.03 kg/m    Body mass index is 27.03 kg/m .  Physical Exam   GENERAL: healthy, alert and no distress  EYES: Eyes grossly normal to inspection, PERRL and conjunctivae and sclerae normal  RESP: lungs clear to auscultation - no rales, rhonchi or wheezes  CV: regular rate and rhythm, normal S1 S2, no S3 or S4, no murmur, click or rub, no peripheral edema and peripheral pulses strong  MS: no gross musculoskeletal defects noted, no edema  NEURO: Normal strength and tone, mentation intact and speech normal  PSYCH: mentation appears normal, affect normal/bright    Labs - Pending    Vangie Mackay PA-C            "

## 2023-03-29 NOTE — PATIENT INSTRUCTIONS
I have renewed your medication for the next 6 months.      Please be sure to schedule a physical with your doctor to be done in the next 6 months for more refills.      Labs done today as well.

## 2023-04-01 ENCOUNTER — TRANSFERRED RECORDS (OUTPATIENT)
Dept: MULTI SPECIALTY CLINIC | Facility: CLINIC | Age: 38
End: 2023-04-01

## 2023-04-01 LAB — PAP SMEAR - HIM PATIENT REPORTED: NORMAL

## 2023-06-21 ENCOUNTER — OFFICE VISIT (OUTPATIENT)
Dept: FAMILY MEDICINE | Facility: CLINIC | Age: 38
End: 2023-06-21
Payer: COMMERCIAL

## 2023-06-21 VITALS
DIASTOLIC BLOOD PRESSURE: 86 MMHG | RESPIRATION RATE: 16 BRPM | SYSTOLIC BLOOD PRESSURE: 130 MMHG | HEART RATE: 84 BPM | WEIGHT: 146.7 LBS | BODY MASS INDEX: 25.99 KG/M2 | HEIGHT: 63 IN | TEMPERATURE: 98 F | OXYGEN SATURATION: 100 %

## 2023-06-21 DIAGNOSIS — N91.2 AMENORRHEA: Primary | ICD-10-CM

## 2023-06-21 DIAGNOSIS — I10 ESSENTIAL HYPERTENSION: ICD-10-CM

## 2023-06-21 PROCEDURE — 99213 OFFICE O/P EST LOW 20 MIN: CPT | Performed by: PHYSICIAN ASSISTANT

## 2023-06-21 RX ORDER — NIFEDIPINE 30 MG/1
30 TABLET, EXTENDED RELEASE ORAL DAILY
Qty: 90 TABLET | Refills: 3 | Status: SHIPPED | OUTPATIENT
Start: 2023-06-21 | End: 2024-06-21

## 2023-06-21 ASSESSMENT — PAIN SCALES - GENERAL: PAINLEVEL: NO PAIN (0)

## 2023-06-21 NOTE — PROGRESS NOTES
"  Assessment & Plan     Essential hypertension  Patient and partner attempting to become pregnant. She wanted to discuss losartan safety. Recommending alternative. Patient intrested in once daily option. Nifedipine discussed. Will start. If needed, monitor at home, and dose can be increased.   - NIFEdipine ER OSMOTIC (PROCARDIA XL) 30 MG 24 hr tablet; Take 1 tablet (30 mg) by mouth daily  Medication use and side effects discussed with the patient. Patient is in complete understanding and agreement with plan.     Amenorrhea  ~1 month since lmp. Discussed and offered urine pregnancy today. Denied for now. To test at home if no period in next week.          Kobi Rios PA-C  [facility] TAYLOR Fuchs is a 38 year old, presenting for the following health issues:  Hypertension        6/21/2023     7:48 AM   Additional Questions   Roomed by Isis FLORES CMA     History of Present Illness       Hypertension: She presents for follow up of hypertension.  She does not check blood pressure  regularly outside of the clinic. Outpatient blood pressures have not been over 140/90. She follows a low salt diet.     She eats 2-3 servings of fruits and vegetables daily.She consumes 3 sweetened beverage(s) daily.She exercises with enough effort to increase her heart rate 30 to 60 minutes per day.  She exercises with enough effort to increase her heart rate 4 days per week.   She is taking medications regularly.         Review of Systems   Constitutional, HEENT, cardiovascular, pulmonary, GI, , musculoskeletal, neuro, skin, endocrine and psych systems are negative, except as otherwise noted.      Objective    /86 (BP Location: Right arm, Patient Position: Sitting, Cuff Size: Adult Regular)   Pulse 84   Temp 98  F (36.7  C) (Temporal)   Resp 16   Ht 1.6 m (5' 3\")   Wt 66.5 kg (146 lb 11.2 oz)   SpO2 100%   BMI 25.99 kg/m    Body mass index is 25.99 kg/m .  Physical Exam   GENERAL: " healthy, alert and no distress  RESP: lungs clear to auscultation - no rales, rhonchi or wheezes  CV: regular rates and rhythm, normal S1 S2, no S3 or S4 and no murmur, click or rub  PSYCH: mentation appears normal, affect normal/bright

## 2023-11-25 ENCOUNTER — HEALTH MAINTENANCE LETTER (OUTPATIENT)
Age: 38
End: 2023-11-25

## 2023-11-28 ENCOUNTER — VIRTUAL VISIT (OUTPATIENT)
Dept: OBGYN | Facility: CLINIC | Age: 38
End: 2023-11-28
Payer: COMMERCIAL

## 2023-11-28 DIAGNOSIS — O09.529 SUPERVISION OF HIGH-RISK PREGNANCY OF ELDERLY MULTIGRAVIDA: Primary | ICD-10-CM

## 2023-11-28 PROCEDURE — 99207 PR NO CHARGE NURSE ONLY: CPT

## 2023-11-28 RX ORDER — LACTOBACILLUS RHAMNOSUS GG 10B CELL
1 CAPSULE ORAL 2 TIMES DAILY
COMMUNITY
End: 2024-05-14

## 2023-11-28 NOTE — PROGRESS NOTES
NPN nurse visit done over the phone. Pt will be given NPN folder and book at her upcoming appt.   Discussed optional screening available to assess chromosomal anomalies. Questions answered. Pt advised to call the clinic if she has any questions or concerns related to her pregnancy. Prenatal labs will be obtained at her upcoming appt. New prenatal visit scheduled on 12/7/23 with Dr. Tariq Oliveira.    Patient is unsure of LMP.  Reports she had some spotting in September and October, but that she did not consider it a period.  Patient reports her periods have always been irregular, and her last sure period was sometime in August.  Patient reports she had not taken a pregnancy test until 11/2/23, and it was positive.  Ultrasound and labs moved up to tomorrow.  Baseline PreE labs added, as patient has CHTN.    Last pap: 04/2023 (through [facility]).    Patient supplied answers from flow sheet for:  Prenatal OB Questionnaire.  Past Medical History  Have you ever recieved care for your mental health? : No  Have you ever been in a major accident or suffered serious trauma?: No  Within the last year, has anyone hit, slapped, kicked or otherwise hurt you?: No  In the last year, has anyone forced you to have sex when you didn't want to?: No    Past Medical History 2   Have you ever received a blood transfusion?: No  Would you accept a blood transfusion if was medically recommended?: Yes  Does anyone in your home smoke?: No   Is your blood type Rh negative?: No  Have you ever ?: (!) Yes  Have you been hospitalized for a nonsurgical reason excluding normal delivery?: No  Have you ever had an abnormal pap smear?: No    Past Medical History (Continued)  Do you have a history of abnormalities of the uterus?: No  Did your mother take HOMERO or any other hormones when she was pregnant with you?: No  Do you have any other problems we have not asked about which you feel may be important to this pregnancy?: No (unknown LMP,  had spotting in September.  Last sure period was sometime in August.)    Gayle JACQUES RN

## 2023-11-28 NOTE — PATIENT INSTRUCTIONS
Learning About Pregnancy  Your Care Instructions     Your health in the early weeks of your pregnancy is particularly important for your baby's health. Take good care of yourself. Anything you do that harms your body can also harm your baby.  Make sure to go to all of your doctor appointments. Regular checkups will help keep you and your baby healthy.  How can you care for yourself at home?  Diet    Eat a balanced diet. Make sure your diet includes plenty of beans, peas, and leafy green vegetables.     Do not skip meals or go for many hours without eating. If you are nauseated, try to eat a small, healthy snack every 2 to 3 hours.     Do not eat fish that has a high level of mercury, such as shark, swordfish, or mackerel. Do not eat more than one can of tuna each week.     Drink plenty of fluids. If you have kidney, heart, or liver disease and have to limit fluids, talk with your doctor before you increase the amount of fluids you drink.     Cut down on caffeine, such as coffee, tea, and cola.     Do not drink alcohol, such as beer, wine, or hard liquor.     Take a multivitamin that contains at least 400 micrograms (mcg) of folic acid to help prevent birth defects. Fortified cereal and whole wheat bread are good additional sources of folic acid.     Increase the calcium in your diet. Try to drink a quart of skim milk each day. You may also take calcium supplements and choose foods such as cheese and yogurt.   Lifestyle    Make sure you go to your follow-up appointments.     Get plenty of rest. You may be unusually tired while you are pregnant.     Get at least 30 minutes of exercise on most days of the week. Walking is a good choice. If you have not exercised in the past, start out slowly. Take several short walks each day.     Do not smoke. If you need help quitting, talk to your doctor about stop-smoking programs. These can increase your chances of quitting for good.     Do not touch cat feces or litter boxes.  Also, wash your hands after you handle raw meat, and fully cook all meat before you eat it. Wear gloves when you work in the yard or garden, and wash your hands well when you are done. Cat feces, raw or undercooked meat, and contaminated dirt can cause an infection that may harm your baby or lead to a miscarriage.     Avoid things that can make your body too hot and may be harmful to your baby, such as a hot tub or sauna. Or talk with your doctor before doing anything that raises your body temperature. Your doctor can tell you if it's safe.     Avoid chemical fumes, paint fumes, or poisons.     Do not use illegal drugs, marijuana, or alcohol.   Medicines    Review all of your medicines with your doctor. Some of your routine medicines may need to be changed to protect your baby.     Use acetaminophen (Tylenol) to relieve minor problems, such as a mild headache or backache or a mild fever with cold symptoms. Do not use nonsteroidal anti-inflammatory drugs (NSAIDs), such as ibuprofen (Advil, Motrin) or naproxen (Aleve), unless your doctor says it is okay.     Do not take two or more pain medicines at the same time unless the doctor told you to. Many pain medicines have acetaminophen, which is Tylenol. Too much acetaminophen (Tylenol) can be harmful.     Take your medicines exactly as prescribed. Call your doctor if you think you are having a problem with your medicine.   To manage morning sickness    If you feel sick when you first wake up, try eating a small snack (such as crackers) before you get out of bed. Allow some time to digest the snack, and then get out of bed slowly.     Do not skip meals or go for long periods without eating. An empty stomach can make nausea worse.     Eat small, frequent meals instead of three large meals each day.     Drink plenty of fluids.     Eat foods that are high in protein but low in fat.     If you are taking iron supplements, ask your doctor if they are necessary. Iron can make  "nausea worse.     Avoid any smells, such as coffee, that make you feel sick.     Get lots of rest. Morning sickness may be worse when you are tired.   Follow-up care is a key part of your treatment and safety. Be sure to make and go to all appointments, and call your doctor if you are having problems. It's also a good idea to know your test results and keep a list of the medicines you take.  Where can you learn more?  Go to [email or web address]://[email or web address] [email or web address].net/patiented  Enter E868 in the search box to learn more about \"Learning About Pregnancy.\"  Current as of: July 11, 2023               Content Version: 13.8    7148-9747 [employer].   Care instructions adapted under license by your healthcare professional. If you have questions about a medical condition or this instruction, always ask your healthcare professional. [employer] disclaims any warranty or liability for your use of this information.      Weeks 6 to 10 of Your Pregnancy: Care Instructions  During these weeks of pregnancy, your body goes through many changes. You may start to feel different, both in your body and your emotions. Each pregnancy is different, so there's no \"right\" way to feel. These early weeks are a time to make healthy choices for you and your pregnancy.    Take a daily prenatal vitamin. Choose one with folic acid in it.   Avoid alcohol, tobacco, and drugs (including marijuana). If you need help quitting, talk to your doctor.     Drink plenty of liquids.  Be sure to drink enough water. And limit sodas, other sweetened drinks, and caffeine.     Choose foods that are good sources of calcium, iron, and folate.  You can try dairy products, dark leafy greens, fortified orange juice and cereals, almonds, broccoli, dried fruit, and beans.     Avoid foods that may be harmful.  Don't eat raw meat, deli meat, raw seafood, or raw eggs. Avoid soft cheese and unpasteurized dairy, like Brie and blue cheese. And don't eat fish that " "contains a lot of mercury, like shark and swordfish.     Don't touch rosmery litter or cat poop.  They can cause an infection that could be harmful during pregnancy.     Avoid things that can make your body too hot.  For example, avoid hot tubs and saunas.     Soothe morning sickness.  Try eating 5 or 6 small meals a day, getting some fresh air, or using elbert to control symptoms.     Ask your doctor about flu and COVID-19 shots.  Getting them can help protect against infection.   Follow-up care is a key part of your treatment and safety. Be sure to make and go to all appointments, and call your doctor if you are having problems. It's also a good idea to know your test results and keep a list of the medicines you take.  Where can you learn more?  Go to [email or web address]://[email or web address]/[email or web address]  Enter G112 in the search box to learn more about \"Weeks 6 to 10 of Your Pregnancy: Care Instructions.\"  Current as of: July 11, 2023               Content Version: 13.8 2006-2023 [employer].   Care instructions adapted under license by your healthcare professional. If you have questions about a medical condition or this instruction, always ask your healthcare professional. [employer] disclaims any warranty or liability for your use of this information.         Managing Morning Sickness (01:55)  Your health professional recommends that you watch this short online health video.  Learn tips for dealing with morning sickness, no matter what time of day you have it.  Purpose:  Gives tips for managing morning sickness, including eating small low-fat meals and avoiding caffeine and spicy food.  Goal:  The user will learn tips for dealing with morning sickness during pregnancy.     How to watch the video    Scan the QR code   OR Visit the website    [email or web address]   Current as of: July 11, 2023               Content Version: 13.8 2006-2023 [employer].   Care " instructions adapted under license by your healthcare professional. If you have questions about a medical condition or this instruction, always ask your healthcare professional. [employer] disclaims any warranty or liability for your use of this information.      Pregnancy and Heartburn: Care Instructions  Overview     Heartburn is a common problem during pregnancy.  Heartburn happens when stomach acid backs up into the tube that carries food to the stomach. This tube is called the esophagus. Early in pregnancy, heartburn is caused by hormone changes that slow down digestion. Later on, it's also caused by the large uterus pushing up on the stomach.  Even though you can't fix the cause, there are things you can do to get relief. Treating heartburn during pregnancy focuses first on making lifestyle changes, like changing what and how you eat, and on taking medicines.  Heartburn usually improves or goes away after childbirth.  Follow-up care is a key part of your treatment and safety. Be sure to make and go to all appointments, and call your doctor if you are having problems. It's also a good idea to know your test results and keep a list of the medicines you take.  How can you care for yourself at home?  Eat small, frequent meals.  Avoid foods that make your symptoms worse, such as chocolate, peppermint, and spicy foods. Avoid drinks with caffeine, such as coffee, tea, and sodas.  Avoid bending over or lying down after meals.  Take a short walk after you eat.  If heartburn is a problem at night, do not eat for 2 hours before bedtime.  Take antacids like Mylanta, Maalox, Rolaids, or Tums. Do not take antacids that have sodium bicarbonate, magnesium trisilicate, or aspirin. Be careful when you take over-the-counter antacid medicines. Many of these medicines have aspirin in them. While you are pregnant, do not take aspirin or medicines that contain aspirin unless your doctor says it is okay.  If you're not  "getting relief, talk to your doctor. You may be able to take a stronger acid-reducing medicine.  When should you call for help?   Call your doctor now or seek immediate medical care if:    You have new or worse belly pain.     You are vomiting.   Watch closely for changes in your health, and be sure to contact your doctor if:    You have new or worse symptoms of reflux.     You are losing weight.     You have trouble or pain swallowing.     You do not get better as expected.   Where can you learn more?  Go to [email or web address]://[email or web address]/[email or web address]  Enter U946 in the search box to learn more about \"Pregnancy and Heartburn: Care Instructions.\"  Current as of: July 11, 2023               Content Version: 13.8    9917-5290 [employer].   Care instructions adapted under license by your healthcare professional. If you have questions about a medical condition or this instruction, always ask your healthcare professional. [employer] disclaims any warranty or liability for your use of this information.      Constipation: Care Instructions  Overview     Constipation means that you have a hard time passing stools (bowel movements). People pass stools from 3 times a day to once every 3 days. What is normal for you may be different. Constipation may occur with pain in the rectum and cramping. The pain may get worse when you try to pass stools. Sometimes there are small amounts of bright red blood on toilet paper or the surface of stools. This is because of enlarged veins near the rectum (hemorrhoids).  A few changes in your diet and lifestyle may help you avoid ongoing constipation. Your doctor may also prescribe medicine to help loosen your stool.  Some medicines can cause constipation. These include pain medicines and antidepressants. Tell your doctor about all the medicines you take. Your doctor may want to make a medicine change to ease your symptoms.  Follow-up care is a key part of your treatment " "and safety. Be sure to make and go to all appointments, and call your doctor if you are having problems. It's also a good idea to know your test results and keep a list of the medicines you take.  How can you care for yourself at home?  Drink plenty of fluids. If you have kidney, heart, or liver disease and have to limit fluids, talk with your doctor before you increase the amount of fluids you drink.  Include high-fiber foods in your diet each day. These include fruits, vegetables, beans, and whole grains.  Get at least 30 minutes of exercise on most days of the week. Walking is a good choice. You also may want to do other activities, such as running, swimming, cycling, or playing tennis or team sports.  Take a fiber supplement, such as Citrucel or Metamucil, every day. Read and follow all instructions on the label.  Schedule time each day for a bowel movement. A daily routine may help. Take your time having a bowel movement, but don't sit for more than 10 minutes at a time. And don't strain too much.  Support your feet with a small step stool when you sit on the toilet. This helps flex your hips and places your pelvis in a squatting position.  Your doctor may recommend an over-the-counter laxative to relieve your constipation. Examples are Milk of Magnesia and MiraLax. Read and follow all instructions on the label. Do not use laxatives on a long-term basis.  When should you call for help?   Call your doctor now or seek immediate medical care if:    You have new or worse belly pain.     You have new or worse nausea or vomiting.     You have blood in your stools.   Watch closely for changes in your health, and be sure to contact your doctor if:    Your constipation is getting worse.     You do not get better as expected.   Where can you learn more?  Go to https://www.healthwise.net/patiented  Enter P343 in the search box to learn more about \"Constipation: Care Instructions.\"  Current as of: March 21, " "2023               Content Version: 13.8 2006-2023 [employer].   Care instructions adapted under license by your healthcare professional. If you have questions about a medical condition or this instruction, always ask your healthcare professional. [employer] disclaims any warranty or liability for your use of this information.      Learning About High-Iron Foods  What foods are high in iron?     The foods you eat contain nutrients, such as vitamins and minerals. Iron is a nutrient. Your body needs the right amount to stay healthy and work as it should. You can use the list below to help you make choices about which foods to eat.  Here are some foods that contain iron. They have 1 to 2 milligrams of iron per serving.  Fruits  Figs (dried), 5 figs  Vegetables  Asparagus (canned), 6 le  Haja, beet, Swiss chard, or turnip greens, 1 cup  Dried peas, cooked,   cup  Seaweed, spirulina (dried),   cup  Spinach, (cooked)   cup or (raw) 1 cup  Grains  Cereals, fortified with iron, 1 cup  Grits (instant, cooked), fortified with iron,   cup  Meats and other protein foods  Beans (kidney, lima, navy, white), canned or cooked,   cup  Beef or lamb, 3 oz  Chicken giblets, 3 oz  Chickpeas (garbanzo beans),   cup  Liver of beef, lamb, or pork, 3 oz  Oysters (cooked), 3 oz  Sardines (canned), 3 oz  Soybeans (boiled),   cup  Tofu (firm),   cup  Work with your doctor to find out how much of this nutrient you need. Depending on your health, you may need more or less of it in your diet.  Where can you learn more?  Go to https://www.healthSynbiota.net/patiented  Enter R005 in the search box to learn more about \"Learning About High-Iron Foods.\"  Current as of: February 28, 2023               Content Version: 13.8 2006-2023 [employer].   Care instructions adapted under license by your healthcare professional. If you have questions about a medical condition or this instruction, always ask your " healthcare professional. Healthwise, [facility] disclaims any warranty or liability for your use of this information.      Learning About Fetal Ultrasound Results  What is a fetal ultrasound?     Fetal ultrasound is a test that lets your doctor see an image of your baby. Your doctor learns information about your baby from this picture. You may find out, for example, if you are having a boy or a girl. But the main reason you have this test is to get information about your baby's health.  (You may hear your baby called a fetus. This is a common medical term for a baby that's growing in the mother's uterus.)  What kind of information can you learn from this test?  The findings of an ultrasound fall into two categories, normal and abnormal.  Normal  The fetus is the right size for its age.  The placenta is the expected size and does not cover the cervix.  There is enough amniotic fluid in the uterus.  No birth defects can be seen.  Abnormal  The fetus is small or large for its age.  The placenta covers the cervix.  There is too much or too little amniotic fluid in the uterus.  The fetus may have a birth defect.  What does an abnormal result mean?  Abnormal seems to imply that something is wrong with your baby. But what it means is that the test has shown something the doctor wants to take a closer look at.  And that's what happens next. Your doctor will talk to you about what further test or tests you may need.  What do the results mean?  Some of the things your doctor may see on an abnormal ultrasound include:  Echogenic bowel.  The bowel looks very bright on the screen. This could mean that there's blood in the bowel. Or it could mean that something is blocking the small bowel.  Increased nuchal translucency.  The ultrasound measures the thickness at the back of the baby's neck. An increase in thickness is sometimes an early sign of Down syndrome.  Increased or decreased amniotic fluid.  The doctor will look for a  "reason for the level of amniotic fluid and will watch the pregnancy closely as it progresses.  Large ventricles.  Ventricles in the brain look larger than they should. Your doctor may take a closer look at the brain.  Renal pyelectasis/hydronephrosis.  The ultrasound measures the fluid around the kidney. If there is more fluid than expected, there is a chance of urinary tract or kidney problems.  Short long bones.  The ultrasound measures certain arm and leg bones. A long bone (humerus or femur) that is shorter than average could be a sign of Down syndrome.  Subchorionic hemorrhage.  An ultrasound can show bleeding under one of the membranes that surrounds the fetus. Some women don't have symptoms of bleeding. The ultrasound can find this problem when women are not bleeding from their vagina. Women who have this condition have a slightly higher chance of miscarriage.  What do you do now?  Take a deep breath, and let it out. Keep in mind that an abnormal finding on an ultrasound, after it's coupled with more information, may:  Turn out to be nothing.  Turn out to be something mild that won't affect the baby.  Turn out to be something more serious. But if this happens, early diagnosis helps you and your doctor plan treatment options sooner rather than later.  Your medical team is there for you. So are your family and friends. Ask questions, and get the help and support you need.  Follow-up care is a key part of your treatment and safety. Be sure to make and go to all appointments, and call your doctor if you are having problems. It's also a good idea to know your test results and keep a list of the medicines you take.  Where can you learn more?  Go to https://www.Fineline.net/patiented  Enter K451 in the search box to learn more about \"Learning About Fetal Ultrasound Results.\"  Current as of: July 11, 2023               Content Version: 13.8    7189-1402 [employer].   Care instructions adapted under " license by your healthcare professional. If you have questions about a medical condition or this instruction, always ask your healthcare professional. [employer] disclaims any warranty or liability for your use of this information.      Learning About Prenatal Visits  Overview     Regular prenatal visits are very important during any pregnancy. These quick office visits may seem simple and routine. But they can help you have a safe and healthy pregnancy. Your doctor is watching for problems that can only be found through regular checkups. The visits also give you and your doctor time to build a good relationship.  It's common to see your doctor every 4 weeks until week 28 of pregnancy. Then the visits will happen more often. From weeks 28 to 36, it's common to have visits every 2 to 3 weeks. In the final month of pregnancy, you likely will see your doctor every week. Your doctor may want to see you more or less often, depending on your health, your age, and if you've had a normal, full-term pregnancy before.  At different times in your pregnancy, you will have exams and tests. Some are routine. Others are done only when there is a chance of a problem. Everything healthy you do for your body helps you have a healthy pregnancy. Rest when you need it. Eat well, drink plenty of water, and exercise regularly.  What happens during a prenatal visit?  You will have blood pressure checks, along with urine tests. You also may have blood tests. If you need to go to the bathroom while waiting for the doctor, tell the nurse. You will be given a sample cup so your urine can be tested.  You will be weighed and have your belly measured.  Your doctor may listen to the fetal heartbeat with a special device.  At about 24 weeks, and possibly earlier in your pregnancy, your doctor will check your blood sugar (glucose tolerance test) for diabetes that can occur during pregnancy. This is gestational diabetes, which can be  harmful.  You will have tests to check for infections that could harm your . These include group B streptococcus and hepatitis B.  Your doctor may do ultrasounds to check for problems. This also checks the position of the fetus. An ultrasound uses sound waves to produce a picture of the fetus.  You may get your vaccines updated.  Your doctor may ask you questions to check for signs of anxiety or depression. Tell your doctor if you feel sad, anxious, or hopeless for more than a few days.  You may have other tests at any time during your pregnancy.  Use your visits to discuss with your doctor any concerns you have.  How can you care for yourself at home?  Get plenty of rest.  Try to exercise every day, if your doctor says it is okay. If you have not exercised in the past, start out slowly. For example, you can take short walks each day.  Choose healthy foods, such as fruits, vegetables, whole grains, lean proteins, low-fat dairy, and healthy fats.  Drink plenty of fluids. Cut down on drinks with caffeine, such as coffee, tea, and cola. If you have kidney, heart, or liver disease and have to limit fluids, talk with your doctor before you increase the amount of fluids you drink.  Try to avoid chemical fumes, paint fumes, and poisons.  If you smoke, vape, or use alcohol, marijuana, or other drugs, quit or cut back as much as you can. Talk to your doctor if you need help quitting.  Review all of your medicines, including over-the-counter medicines and supplements, with your doctor. Some of your routine medicines may need to be changed. Do not stop or start taking any medicines without talking to your doctor first.  Follow-up care is a key part of your treatment and safety. Be sure to make and go to all appointments, and call your doctor if you are having problems. It's also a good idea to know your test results and keep a list of the medicines you take.  Where can you learn more?  Go to  "https://www.LogMeIn.net/patiented  Enter J502 in the search box to learn more about \"Learning About Prenatal Visits.\"  Current as of: July 11, 2023               Content Version: 13.8    0872-4262 [employer].   Care instructions adapted under license by your healthcare professional. If you have questions about a medical condition or this instruction, always ask your healthcare professional. [employer] disclaims any warranty or liability for your use of this information.      Learning About Intimate Partner Violence  What is intimate partner violence?  Intimate partner violence is a type of domestic abuse. It's threatening, emotionally harmful, or violent behavior in a personal relationship. It can happen between past or current partners or spouses. In some relationships both people abuse each other. One partner may be more abusive. Or the abuse may be equal.  Abuse can affect people of any ethnic group, race, or Nondenominational. It can affect teens, adults, or the elderly. And it can happen to people of any sexual orientation, gender, or social status.  Abusers use fear, bullying, and threats to control their partners. They may control what their partners do. They may control where their partners go or who they see. They may act jealous, controlling, or possessive. These early signs of abuse may happen soon after the start of the relationship. Sometimes it can be hard to notice abuse at first. But after the relationship becomes more serious, the abuse may get worse.  If you are being abused in your relationship, it's important to get help. The abuse is not your fault. You don't have to face it alone.  Be careful  It may not be safe to take home domestic abuse information like this handout. Some people ask a trusted friend to keep it for them. It's also important to plan ahead and to memorize the phone number of places you can go for help. If you are concerned about your safety, do not use " your computer, smartphone, or tablet to read about domestic abuse.   What are the types of intimate partner violence?  Abuse can happen in different ways. Each type can happen on its own or in combination with others.  Emotional abuse  Emotional abuse is a pattern of threats, insults, or controlling behavior. It includes verbal abuse. It goes beyond healthy disagreements in a relationship. It's a sign of an unhealthy relationship.  Do you feel threatened, intimidated, or controlled?  Does your partner:  Threaten your children, other family members, or pets?  Use jokes meant to embarrass or shame you?  Call you names?  Tell you that you are a bad parent?  Threaten to take away your children?  Threaten to have you or your family members deported?  Control your access to money or other basic needs?  Control what you do, who you see or talk to, or where you go?  Another form of emotional abuse is denying that it is happening. Or the abuser may act like the abuse is no big deal or is your fault.  Sexual abuse  With sexual abuse, abusers may try to convince or force you to have sex. They may force you into sex acts you're not comfortable with. Or they may sexually assault you. Sexual abuse can happen even if you are in a committed relationship.  Physical abuse  Physical abuse means that a partner hits, kicks, or does something else to physically hurt you. Physical abuse that starts with a slap might lead to kicking, shoving, and choking over time. The abuser may also threaten to hurt or kill you.  Stalking  Stalking means that an abuser gives you attention that you do not want and that causes you fear. Examples of stalking include:  Following you.  Showing up at places where the abuser isn't invited, such as at your work or school.  Constantly calling or texting you.  What problems can  to?  Intimate partner violence can be very dangerous. It can cause serious, repeated injury. It can even lead to death.  All forms  "of abuse can cause long-term health problems from the stress of a violent relationship. Verbal abuse can lead to sexual and physical abuse.  Abuse causes:  Emotional pain.  Depression.  Anxiety.  Post-traumatic stress.  Sexual abuse can lead to sexually transmitted infections (such as HIV/AIDS) and unplanned pregnancy.  Pregnancy can be a very dangerous time for people in abusive relationships. Abuse can cause or increase the risk of problems during pregnancy. These include low weight gain, anemia, infections, and bleeding. Abuse may also increase your baby's risk of low birth weight, premature birth, and death.  It can be hard for some victims of abuse to ask for help or to leave their relationship. You may feel scared, stuck, or not sure what steps to take. But it's important not to ignore abuse. Talking to someone you trust could be the first step to ending the abuse and taking care of your own health and happiness again. There are resources available that can help keep you safe.  Where can you get help?  Talk to a trusted friend. Find a local advocacy group, or talk to your doctor about the abuse.  Contact the National Domestic Violence Hotline at 9-659-743-HZUD (1-737.423.8679) for more safety tips. They can guide you to groups in your area that can help. Or go to the National Coalition Against Domestic Violence website at www.thehotline.org to learn more.  Domestic violence groups or a counselor in your area can help you make a safety plan for yourself and your children.  When to call for help  Call 911 anytime you think you may need emergency care. For example, call if:  You think that you or someone you know is in danger of being abused.  You have been hurt and can't have someone safely take you to emergency care.  You have just been abused.  A family member has just been abused.  Where can you learn more?  Go to https://www.healthwise.net/patiented  Enter S665 in the search box to learn more about \"Learning " "About Intimate Partner Violence.\"  Current as of: June 25, 2023               Content Version: 13.8    6177-9255 [employer].   Care instructions adapted under license by your healthcare professional. If you have questions about a medical condition or this instruction, always ask your healthcare professional. [employer] disclaims any warranty or liability for your use of this information.      Vaginal Bleeding During Pregnancy: Care Instructions  Overview     It's common to have some vaginal spotting when you are pregnant. In some cases, the bleeding isn't serious. And there aren't any more problems with the pregnancy.  But sometimes bleeding is a sign of a more serious problem. This is more common if the bleeding is heavy or painful. Examples of more serious problems include miscarriage, an ectopic pregnancy, and a problem with the placenta.  You may have to see your doctor again to be sure everything is okay. You may also need more tests to find the cause of the bleeding.  Home treatment may be all you need. But it depends on what is causing the bleeding. Be sure to tell your doctor if you have any new symptoms or if your symptoms get worse.  The doctor has checked you carefully, but problems can develop later. If you notice any problems or new symptoms, get medical treatment right away.  Follow-up care is a key part of your treatment and safety. Be sure to make and go to all appointments, and call your doctor if you are having problems. It's also a good idea to know your test results and keep a list of the medicines you take.  How can you care for yourself at home?  If your doctor prescribed medicines, take them exactly as directed. Call your doctor if you think you are having a problem with your medicine.  Do not have vaginal sex until your doctor says it's okay.  Do not put anything in your vagina until your doctor says it's okay.  Ask your doctor about other activities you can or " "can't do.  Get a lot of rest. Being pregnant can make you tired.  Do not use nonsteroidal anti-inflammatory drugs (NSAIDs), such as ibuprofen (Advil, Motrin), naproxen (Aleve), or aspirin, unless your doctor says it is okay.  When should you call for help?   Call 911 anytime you think you may need emergency care. For example, call if:    You passed out (lost consciousness).     You have severe vaginal bleeding. This means you are soaking through a pad each hour for 2 or more hours.     You have sudden, severe pain in your belly or pelvis.   Call your doctor now or seek immediate medical care if:    You have new or worse vaginal bleeding.     You are dizzy or lightheaded, or you feel like you may faint.     You have pain in your belly, pelvis, or lower back.     You think that you are in labor.     You have a sudden release of fluid from your vagina.     You've been having regular contractions for an hour. This means that you've had at least 8 contractions within 1 hour or at least 4 contractions within 20 minutes, even after you change your position and drink fluids.     You notice that your baby has stopped moving or is moving much less than normal.   Watch closely for changes in your health, and be sure to contact your doctor if you have any problems.  Where can you learn more?  Go to https://www.[employer].net/patiented  Enter N829 in the search box to learn more about \"Vaginal Bleeding During Pregnancy: Care Instructions.\"  Current as of: July 11, 2023               Content Version: 13.8    6582-5095 [employer].   Care instructions adapted under license by your healthcare professional. If you have questions about a medical condition or this instruction, always ask your healthcare professional. [employer] disclaims any warranty or liability for your use of this information.      "

## 2023-11-29 LAB
ABO/RH(D): NORMAL
ANTIBODY SCREEN: NEGATIVE
SPECIMEN EXPIRATION DATE: NORMAL

## 2023-11-30 ENCOUNTER — ANCILLARY PROCEDURE (OUTPATIENT)
Dept: ULTRASOUND IMAGING | Facility: CLINIC | Age: 38
End: 2023-11-30
Attending: OBSTETRICS & GYNECOLOGY
Payer: COMMERCIAL

## 2023-11-30 ENCOUNTER — LAB (OUTPATIENT)
Dept: LAB | Facility: CLINIC | Age: 38
End: 2023-11-30
Payer: COMMERCIAL

## 2023-11-30 DIAGNOSIS — O20.0 MISCARRIAGE, THREATENED, EARLY PREGNANCY: Primary | ICD-10-CM

## 2023-11-30 DIAGNOSIS — O09.529 SUPERVISION OF HIGH-RISK PREGNANCY OF ELDERLY MULTIGRAVIDA: ICD-10-CM

## 2023-11-30 LAB
ALBUMIN MFR UR ELPH: 15.2 MG/DL
ALT SERPL W P-5'-P-CCNC: 85 U/L (ref 0–50)
ANION GAP SERPL CALCULATED.3IONS-SCNC: 11 MMOL/L (ref 7–15)
AST SERPL W P-5'-P-CCNC: 50 U/L (ref 0–45)
BUN SERPL-MCNC: 12.5 MG/DL (ref 6–20)
CALCIUM SERPL-MCNC: 9.1 MG/DL (ref 8.6–10)
CHLORIDE SERPL-SCNC: 106 MMOL/L (ref 98–107)
CREAT SERPL-MCNC: 0.64 MG/DL (ref 0.51–0.95)
CREAT UR-MCNC: 81.2 MG/DL
DEPRECATED HCO3 PLAS-SCNC: 22 MMOL/L (ref 22–29)
EGFRCR SERPLBLD CKD-EPI 2021: >90 ML/MIN/1.73M2
ERYTHROCYTE [DISTWIDTH] IN BLOOD BY AUTOMATED COUNT: 13.5 % (ref 10–15)
GLUCOSE SERPL-MCNC: 90 MG/DL (ref 70–99)
HBV SURFACE AG SERPL QL IA: NONREACTIVE
HCG INTACT+B SERPL-ACNC: 8225 MIU/ML
HCT VFR BLD AUTO: 40.2 % (ref 35–47)
HCV AB SERPL QL IA: NONREACTIVE
HGB BLD-MCNC: 13.5 G/DL (ref 11.7–15.7)
HIV 1+2 AB+HIV1 P24 AG SERPL QL IA: NONREACTIVE
MCH RBC QN AUTO: 30.7 PG (ref 26.5–33)
MCHC RBC AUTO-ENTMCNC: 33.6 G/DL (ref 31.5–36.5)
MCV RBC AUTO: 91 FL (ref 78–100)
PLATELET # BLD AUTO: 300 10E3/UL (ref 150–450)
POTASSIUM SERPL-SCNC: 4.4 MMOL/L (ref 3.4–5.3)
PROT/CREAT 24H UR: 0.19 MG/MG CR (ref 0–0.2)
RBC # BLD AUTO: 4.4 10E6/UL (ref 3.8–5.2)
RUBV IGG SERPL QL IA: 1.92 INDEX
RUBV IGG SERPL QL IA: POSITIVE
SODIUM SERPL-SCNC: 139 MMOL/L (ref 135–145)
T PALLIDUM AB SER QL: NONREACTIVE
URATE SERPL-MCNC: 3.4 MG/DL (ref 2.4–5.7)
WBC # BLD AUTO: 9.4 10E3/UL (ref 4–11)

## 2023-11-30 PROCEDURE — 84550 ASSAY OF BLOOD/URIC ACID: CPT

## 2023-11-30 PROCEDURE — 76801 OB US < 14 WKS SINGLE FETUS: CPT | Performed by: OBSTETRICS & GYNECOLOGY

## 2023-11-30 PROCEDURE — 86901 BLOOD TYPING SEROLOGIC RH(D): CPT

## 2023-11-30 PROCEDURE — 84156 ASSAY OF PROTEIN URINE: CPT

## 2023-11-30 PROCEDURE — 84450 TRANSFERASE (AST) (SGOT): CPT

## 2023-11-30 PROCEDURE — 80048 BASIC METABOLIC PNL TOTAL CA: CPT

## 2023-11-30 PROCEDURE — 86762 RUBELLA ANTIBODY: CPT

## 2023-11-30 PROCEDURE — 84702 CHORIONIC GONADOTROPIN TEST: CPT

## 2023-11-30 PROCEDURE — 86900 BLOOD TYPING SEROLOGIC ABO: CPT

## 2023-11-30 PROCEDURE — 36415 COLL VENOUS BLD VENIPUNCTURE: CPT

## 2023-11-30 PROCEDURE — 87086 URINE CULTURE/COLONY COUNT: CPT

## 2023-11-30 PROCEDURE — 86780 TREPONEMA PALLIDUM: CPT

## 2023-11-30 PROCEDURE — 87340 HEPATITIS B SURFACE AG IA: CPT

## 2023-11-30 PROCEDURE — 87389 HIV-1 AG W/HIV-1&-2 AB AG IA: CPT

## 2023-11-30 PROCEDURE — 86803 HEPATITIS C AB TEST: CPT

## 2023-11-30 PROCEDURE — 85027 COMPLETE CBC AUTOMATED: CPT

## 2023-11-30 PROCEDURE — 76817 TRANSVAGINAL US OBSTETRIC: CPT | Performed by: OBSTETRICS & GYNECOLOGY

## 2023-11-30 PROCEDURE — 86850 RBC ANTIBODY SCREEN: CPT

## 2023-11-30 PROCEDURE — 84460 ALANINE AMINO (ALT) (SGPT): CPT

## 2023-12-01 ENCOUNTER — NURSE TRIAGE (OUTPATIENT)
Dept: INTERNAL MEDICINE | Facility: CLINIC | Age: 38
End: 2023-12-01
Payer: COMMERCIAL

## 2023-12-01 LAB — BACTERIA UR CULT: NO GROWTH

## 2023-12-01 NOTE — TELEPHONE ENCOUNTER
Pt will continue to monitor for now. She will do f/up hcg quant Sunday at walk in lab. Has U/s scheduled for Friday Dec 7.    Discussed possible outcomes and expectations.      Mónica NEAL RN BSN

## 2023-12-01 NOTE — TELEPHONE ENCOUNTER
Cramping bleeding, large blood clot     Then bleeding stopped     Yesterday cramping and bleeding after US     6 weeks - could not find heart beat, too early     ONSET: yesterday had some bleeding just with wiping, just tiny bit   SPOTTING: brownish-red, more brownish   CLOT: large - half of hand size, didn't think she passed tissue   DURATION: 1 day   ABDOMINAL: slight cramping yesterday     Pt is okay today     Warm consult transferred pt over to OBGYN nurse     Billie MOSELEY, Triage RN  [facility]         Reason for Disposition   MILD vaginal bleeding (i.e., less than 1 pad / hour; less than patient's usual menstrual bleeding; not just spotting)    Additional Information   Negative: Shock suspected (e.g., cold/pale/clammy skin, too weak to stand, low BP, rapid pulse)   Negative: Difficult to awaken or acting confused (e.g., disoriented, slurred speech)   Negative: Passed out (i.e., fainted, collapsed and was not responding)   Negative: Sounds like a life-threatening emergency to the triager   Negative: Vaginal bleeding and pregnant 20 or more weeks   Negative: Not pregnant or pregnancy status unknown   Negative: MODERATE vaginal bleeding (i.e., soaking 1 pad / hour, clots) and pregnant > 12 weeks   Negative: SEVERE dizziness (e.g., unable to stand, requires support to walk, feels like passing out)   Negative: MODERATE vaginal bleeding (i.e., soaking 1 pad) and present > 6 hours   Negative: SEVERE abdominal pain (e.g., excruciating)   Negative: SEVERE vaginal bleeding (e.g., soaking 2 pads or tampons per hour and present 2 or more hours; 1 menstrual cup every 2 hours)   Negative: Passed tissue (e.g., gray-white)   Negative: Shoulder pain   Negative: Constant abdominal pain lasting > 1 hour   Negative: Fever 100.4 F (38.0 C) or higher   Negative: Pale skin (pallor) of new-onset or worsening   Negative: Patient sounds very sick or weak to the triager   Negative: MODERATE vaginal  bleeding (i.e., soaking 1 pad / hour; clots)   Negative: Intermittent lower abdominal pain (e.g., cramping) lasting > 24 hours   Negative: Pain or burning with passing urine (urination)   Negative: Prior history of 'ectopic pregnancy' or previous tubal surgery (e.g., tubal ligation)   Negative: Using heparin (e.g., Lovenox) or other strong blood thinner, or known bleeding disorder (e.g., thrombocytopenia)   Negative: Patient wants to be seen    Protocols used: Pregnancy - Vaginal Bleeding Less Than 20 Weeks EGA-A-[place]

## 2023-12-07 ENCOUNTER — ANCILLARY PROCEDURE (OUTPATIENT)
Dept: ULTRASOUND IMAGING | Facility: CLINIC | Age: 38
End: 2023-12-07
Attending: OBSTETRICS & GYNECOLOGY
Payer: COMMERCIAL

## 2023-12-07 ENCOUNTER — TELEPHONE (OUTPATIENT)
Dept: OBGYN | Facility: CLINIC | Age: 38
End: 2023-12-07

## 2023-12-07 DIAGNOSIS — O20.0 MISCARRIAGE, THREATENED, EARLY PREGNANCY: ICD-10-CM

## 2023-12-07 PROCEDURE — 76817 TRANSVAGINAL US OBSTETRIC: CPT | Performed by: OBSTETRICS & GYNECOLOGY

## 2023-12-07 PROCEDURE — 76801 OB US < 14 WKS SINGLE FETUS: CPT | Performed by: OBSTETRICS & GYNECOLOGY

## 2023-12-07 NOTE — TELEPHONE ENCOUNTER
Laquita Oliveira MD  [facility] Triage  Repeat US c/w incomplete miscarriage. Patient scheduled tomorrow with Dr. KERRI Oliveira to discuss. Please call and notify patient of results, review miscarriage precautions, and remind her of her appointment tomorrow to review management.    Thanks,  Laquita Oliveira MD

## 2023-12-07 NOTE — TELEPHONE ENCOUNTER
Call to pt to let her know of miscarriage, offered condolences and let her know Dr KERRI Oliveira will discuss more tomorrow at appt.     Gave bleeding too much and pain precautions.     GONZÁLEZ Mcnulty

## 2023-12-08 ENCOUNTER — OFFICE VISIT (OUTPATIENT)
Dept: OBGYN | Facility: CLINIC | Age: 38
End: 2023-12-08
Payer: COMMERCIAL

## 2023-12-08 VITALS — SYSTOLIC BLOOD PRESSURE: 128 MMHG | BODY MASS INDEX: 28.27 KG/M2 | DIASTOLIC BLOOD PRESSURE: 84 MMHG | WEIGHT: 159.6 LBS

## 2023-12-08 DIAGNOSIS — O20.0 MISCARRIAGE, THREATENED, EARLY PREGNANCY: ICD-10-CM

## 2023-12-08 LAB — HCG INTACT+B SERPL-ACNC: 1474 MIU/ML

## 2023-12-08 PROCEDURE — 36415 COLL VENOUS BLD VENIPUNCTURE: CPT | Performed by: OBSTETRICS & GYNECOLOGY

## 2023-12-08 PROCEDURE — 99202 OFFICE O/P NEW SF 15 MIN: CPT | Performed by: OBSTETRICS & GYNECOLOGY

## 2023-12-08 PROCEDURE — 84702 CHORIONIC GONADOTROPIN TEST: CPT | Performed by: OBSTETRICS & GYNECOLOGY

## 2023-12-08 NOTE — PROGRESS NOTES
SUBJECTIVE:       I spent 25 redone on minutes on the care ofShila on the day of service including time [id number] he no I cannot get a lot of that minutes of face-to-face time with remainder in chart review, care coordination, documentation on the date of service.                                                Shila Batres is a 38 year old female who presents to clinic today for the following health issue(s):  Patient presents with:  Follow Up: Miscarriage, Patient had US yesterday that showed MAB, patient is not having any cramping and bleeding is minimal.     Patient is a 38-year-old P3 013 now.  She has had a couple ultrasounds that have demonstrated a nonviable pregnancy that has a gestational sac that is irregular in contour and no fetal pole at this point.  She states better than 7 weeks along.  She is aware of the miscarriage and has been dealing with this issue now for couple weeks.  She presents with a number of questions as to what could possibly have caused the miscarriage.  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  We talked about the fecundity rate is a 38-year-old with and also with the miscarriage rate.  She is asking whether it be smart to try and get pregnant again.  I explained that this is a personal decision and there are risks that are associated with age and she should be aware of some of them and so we discussed the the risks of miscarriage as well as the risks of trisomies at her age.  She will give that more thought and at some point genetic counselor would be appropriate prior to actually moving ahead with that idea.    She also inquired about what to do at this point when it comes to the miscarriage.  She currently is not bleeding and not having cramping and it has been approximately a week and a half since she had the first concern was raised with an ultrasound.  I described the natural course of miscarriage with her that and in a resolution naturally or with  medical management or surgical management.  We talked about a variety of different potential scenarios that could happen and at this point no decision was made as to what direction she would like to go other than to expectantly manage this and see how the next week unfolds.    I also suggest that we follow her pregnancy for the next week with an hCG and see if that is following properly as that might change the management and then have her repeated again next Wednesday and they will see her on Thursday to summarize this and see if definitive plan is appropriate.  And she is agreeing with that strategy.    Exam is deferred    Assessment is a advanced maternal age with a small gestational sac that does not appear to be moving towards a viable pregnancy and instead has negative cardiac activity and negative products within the gestational sac for the last week and a half.    Plan then is to have her follow-up in a week's time and have an hCG done today and then also 6 days later we will see her on the seventh day in the clinic and discuss what is the strategy from there.    Patient's last menstrual period was 10/24/2023 (approximate)..     Patient is sexually active, .  Using none for contraception.    reports that she has never smoked. She has never used smokeless tobacco.    STD testing offered?   Patient is here for miscarriage follow-up    Health maintenance updated:  yes    Today's PHQ-2 Score:       2023    10:36 AM   PHQ-2 (  [employer])   Q1: Little interest or pleasure in doing things 0   Q2: Feeling down, depressed or hopeless 0   PHQ-2 Score 0     Today's PHQ-9 Score:       2010     4:15 PM   PHQ-9 SCORE   PHQ-9 Total Score 3     Today's DONOVAN-7 Score:        No data to display                Problem list and histories reviewed & adjusted, as indicated.  Additional history: as documented.    Patient Active Problem List   Diagnosis    Obesity (BMI 30-39.9)    Essential hypertension     Past  Surgical History:   Procedure Laterality Date    TYMPANOPLASTY Left 1996      Social History     Tobacco Use    Smoking status: Never    Smokeless tobacco: Never   Substance Use Topics    Alcohol use: Not Currently      Problem (# of Occurrences) Relation (Name,Age of Onset)    Unknown/Adopted (1) Father    Hypertension (1) Mother    Lupus (1) Sister    Hyperlipidemia (1) Mother    Diabetes Type 2  (1) Mother           Negative family history of: Myocardial Infarction, Cerebrovascular Disease, Coronary Artery Disease Early Onset, Breast Cancer, Ovarian Cancer, Colon Cancer              Current Outpatient Medications   Medication Sig    Blood Pressure Monitor KIT Check blood pressure daily    lactobacillus rhamnosus, GG, (CULTURELL) capsule Take 1 capsule by mouth 2 times daily    NIFEdipine ER OSMOTIC (PROCARDIA XL) 30 MG 24 hr tablet Take 1 tablet (30 mg) by mouth daily     No current facility-administered medications for this visit.     Allergies   Allergen Reactions    Nkda [No Known Drug Allergy]          OBJECTIVE:     /84   Wt 72.4 kg (159 lb 9.6 oz)   LMP 10/24/2023 (Approximate)   BMI 28.27 kg/m    Body mass index is 28.27 kg/m .    Exam:  Deferred    In-Clinic Test Results:  No results found for this or any previous visit (from the past 24 hour(s)).    ASSESSMENT/PLAN:                                                      See above    Tariq Oliveira MD  [facility]

## 2023-12-08 NOTE — NURSING NOTE
"Chief Complaint   Patient presents with    Follow Up     Miscarriage, Patient had US yesterday that showed MAB, patient is not having any cramping and bleeding is minimal.        Initial /84   Wt 72.4 kg (159 lb 9.6 oz)   LMP 10/24/2023 (Approximate)   BMI 28.27 kg/m   Estimated body mass index is 28.27 kg/m  as calculated from the following:    Height as of 23: 1.6 m (5' 3\").    Weight as of this encounter: 72.4 kg (159 lb 9.6 oz).  BP completed using cuff size: regular    Questioned patient about current smoking habits.  Pt. has never smoked.          The following HM Due: NONE    Say Melvin CMA               "

## 2023-12-12 NOTE — RESULT ENCOUNTER NOTE
Please call patient with need for repeat HCG quant 1 week after last. She has follow up w/Dr. KERRI Oliveira on the 14th.     Laquita Oliveira MD

## 2024-02-03 ENCOUNTER — HEALTH MAINTENANCE LETTER (OUTPATIENT)
Age: 39
End: 2024-02-03

## 2024-05-13 ENCOUNTER — TELEPHONE (OUTPATIENT)
Dept: OBGYN | Facility: CLINIC | Age: 39
End: 2024-05-13
Payer: COMMERCIAL

## 2024-05-13 NOTE — TELEPHONE ENCOUNTER
[facility] Call Center    Phone Message    May a detailed message be left on voicemail: yes     Reason for Call: Other: Pt scheduled her first initial prenatal visits, first available OB visit isnt till 6/13. Pt will be past 15 weeks then please advise and call pt if pt needs to be seen sooner. Per guidelines pt needs to be seen within 10-12 weeks.      Action Taken: Message routed to:  Other: [facility]    Travel Screening: Not Applicable

## 2024-05-13 NOTE — TELEPHONE ENCOUNTER
Yates from provider, does not want to see NOB patient on a call day.    Next available NOB appointment not until 6/4/24, when patient is 15 weeks.  Dr. Vargas willing to see patient on 5/23 at 1000.    Returned call to patient to offer that appointment instead, no answer and the mailbox full.  Will send patient [employer] message.    Gayle JACQUES RN

## 2024-05-14 ENCOUNTER — VIRTUAL VISIT (OUTPATIENT)
Dept: OBGYN | Facility: CLINIC | Age: 39
End: 2024-05-14
Payer: COMMERCIAL

## 2024-05-14 DIAGNOSIS — O09.529 HIGH-RISK PREGNANCY, ELDERLY MULTIGRAVIDA, UNSPECIFIED TRIMESTER: Primary | ICD-10-CM

## 2024-05-14 PROBLEM — E66.9 OBESITY (BMI 30-39.9): Status: RESOLVED | Noted: 2018-10-25 | Resolved: 2024-05-14

## 2024-05-14 LAB
ABO/RH(D): NORMAL
ANTIBODY SCREEN: NEGATIVE
SPECIMEN EXPIRATION DATE: NORMAL

## 2024-05-14 PROCEDURE — 99207 PR NO CHARGE NURSE ONLY: CPT | Mod: 93

## 2024-05-14 RX ORDER — PRENATAL VIT/IRON FUM/FOLIC AC 27MG-0.8MG
1 TABLET ORAL DAILY
Qty: 90 TABLET | Refills: 3 | Status: SHIPPED
Start: 2024-05-14

## 2024-05-14 NOTE — PROGRESS NOTES
"Chief Complaint   Patient presents with    Prenatal Care     New Prenatal Nurse Telephone visit     12w0d      Initial LMP 2024 (Approximate)   Breastfeeding Unknown  Estimated body mass index is 28.27 kg/m  as calculated from the following:    Height as of 23: 1.6 m (5' 3\").    Weight as of 23: 72.4 kg (159 lb 9.6 oz).  BP completed using cuff size: NA (Not Taken)    Questioned patient about current smoking habits.  Pt. has never smoked.      NPN nurse visit done over the phone. Pt will be given NPN folder and book at her upcoming appt.   Discussed optional screening available to assess chromosomal anomalies. Questions answered. Pt advised to call the clinic if she has any questions or concerns related to her pregnancy. Prenatal labs will be obtained at her upcoming appt. New prenatal visit scheduled on 24 with Dr Vargas.        Menstrual cycles: regular    Last pap:  23        Patient supplied answers from flow sheet for:  Prenatal OB Questionnaire.  Past Medical History  Have you ever recieved care for your mental health? : No  Have you ever been in a major accident or suffered serious trauma?: No  Within the last year, has anyone hit, slapped, kicked or otherwise hurt you?: No  In the last year, has anyone forced you to have sex when you didn't want to?: No    Past Medical History 2   Have you ever received a blood transfusion?: No  Would you accept a blood transfusion if was medically recommended?: Yes  Does anyone in your home smoke?: No   Is your blood type Rh negative?: No  Have you ever ?: (!) Yes  Have you been hospitalized for a nonsurgical reason excluding normal delivery?: No  Have you ever had an abnormal pap smear?: No    Past Medical History (Continued)  Do you have a history of abnormalities of the uterus?: No  Did your mother take HOMERO or any other hormones when she was pregnant with you?: No  Do you have any other problems we have not asked about which " you feel may be important to this pregnancy?: No     aMlia Barnes RN

## 2024-05-15 ENCOUNTER — LAB (OUTPATIENT)
Dept: LAB | Facility: CLINIC | Age: 39
End: 2024-05-15
Payer: COMMERCIAL

## 2024-05-15 ENCOUNTER — ANCILLARY PROCEDURE (OUTPATIENT)
Dept: ULTRASOUND IMAGING | Facility: CLINIC | Age: 39
End: 2024-05-15
Payer: COMMERCIAL

## 2024-05-15 DIAGNOSIS — O09.529 HIGH-RISK PREGNANCY, ELDERLY MULTIGRAVIDA, UNSPECIFIED TRIMESTER: ICD-10-CM

## 2024-05-15 LAB
ERYTHROCYTE [DISTWIDTH] IN BLOOD BY AUTOMATED COUNT: 19.1 % (ref 10–15)
HCT VFR BLD AUTO: 40 % (ref 35–47)
HGB BLD-MCNC: 13.1 G/DL (ref 11.7–15.7)
MCH RBC QN AUTO: 27.2 PG (ref 26.5–33)
MCHC RBC AUTO-ENTMCNC: 32.8 G/DL (ref 31.5–36.5)
MCV RBC AUTO: 83 FL (ref 78–100)
PLATELET # BLD AUTO: 347 10E3/UL (ref 150–450)
RBC # BLD AUTO: 4.82 10E6/UL (ref 3.8–5.2)
WBC # BLD AUTO: 8.3 10E3/UL (ref 4–11)

## 2024-05-15 PROCEDURE — 87340 HEPATITIS B SURFACE AG IA: CPT

## 2024-05-15 PROCEDURE — 86803 HEPATITIS C AB TEST: CPT

## 2024-05-15 PROCEDURE — 87086 URINE CULTURE/COLONY COUNT: CPT

## 2024-05-15 PROCEDURE — 86780 TREPONEMA PALLIDUM: CPT

## 2024-05-15 PROCEDURE — 36415 COLL VENOUS BLD VENIPUNCTURE: CPT

## 2024-05-15 PROCEDURE — 87389 HIV-1 AG W/HIV-1&-2 AB AG IA: CPT

## 2024-05-15 PROCEDURE — 86901 BLOOD TYPING SEROLOGIC RH(D): CPT

## 2024-05-15 PROCEDURE — 86900 BLOOD TYPING SEROLOGIC ABO: CPT

## 2024-05-15 PROCEDURE — 76817 TRANSVAGINAL US OBSTETRIC: CPT | Performed by: OBSTETRICS & GYNECOLOGY

## 2024-05-15 PROCEDURE — 86850 RBC ANTIBODY SCREEN: CPT

## 2024-05-15 PROCEDURE — 86762 RUBELLA ANTIBODY: CPT

## 2024-05-15 PROCEDURE — 76801 OB US < 14 WKS SINGLE FETUS: CPT | Performed by: OBSTETRICS & GYNECOLOGY

## 2024-05-15 PROCEDURE — 85027 COMPLETE CBC AUTOMATED: CPT

## 2024-05-16 ENCOUNTER — LAB (OUTPATIENT)
Dept: LAB | Facility: CLINIC | Age: 39
End: 2024-05-16
Payer: COMMERCIAL

## 2024-05-16 DIAGNOSIS — O20.0 MISCARRIAGE, THREATENED, EARLY PREGNANCY: ICD-10-CM

## 2024-05-16 LAB
BACTERIA UR CULT: NO GROWTH
HBV SURFACE AG SERPL QL IA: NONREACTIVE
HCG INTACT+B SERPL-ACNC: <1 MIU/ML
HCV AB SERPL QL IA: NONREACTIVE
HIV 1+2 AB+HIV1 P24 AG SERPL QL IA: NONREACTIVE
RUBV IGG SERPL QL IA: 1.86 INDEX
RUBV IGG SERPL QL IA: POSITIVE
T PALLIDUM AB SER QL: NONREACTIVE

## 2024-05-16 PROCEDURE — 84702 CHORIONIC GONADOTROPIN TEST: CPT

## 2024-05-16 PROCEDURE — 36415 COLL VENOUS BLD VENIPUNCTURE: CPT

## 2024-06-21 ENCOUNTER — TELEPHONE (OUTPATIENT)
Dept: FAMILY MEDICINE | Facility: CLINIC | Age: 39
End: 2024-06-21
Payer: COMMERCIAL

## 2024-06-21 DIAGNOSIS — I10 ESSENTIAL HYPERTENSION: ICD-10-CM

## 2024-06-21 RX ORDER — NIFEDIPINE 30 MG/1
30 TABLET, EXTENDED RELEASE ORAL DAILY
Qty: 90 TABLET | Refills: 0 | Status: SHIPPED | OUTPATIENT
Start: 2024-06-21 | End: 2024-09-11

## 2024-09-02 ENCOUNTER — APPOINTMENT (OUTPATIENT)
Dept: ULTRASOUND IMAGING | Facility: CLINIC | Age: 39
End: 2024-09-02
Attending: PHYSICIAN ASSISTANT
Payer: COMMERCIAL

## 2024-09-02 ENCOUNTER — HOSPITAL ENCOUNTER (EMERGENCY)
Facility: CLINIC | Age: 39
Discharge: HOME | End: 2024-09-02
Attending: PHYSICIAN ASSISTANT | Admitting: PHYSICIAN ASSISTANT
Payer: COMMERCIAL

## 2024-09-02 VITALS
BODY MASS INDEX: 30.22 KG/M2 | SYSTOLIC BLOOD PRESSURE: 130 MMHG | TEMPERATURE: 97.9 F | DIASTOLIC BLOOD PRESSURE: 90 MMHG | HEART RATE: 82 BPM | OXYGEN SATURATION: 95 % | HEIGHT: 62 IN | RESPIRATION RATE: 18 BRPM | WEIGHT: 164.24 LBS

## 2024-09-02 DIAGNOSIS — O46.8X1 SUBCHORIONIC HEMATOMA IN FIRST TRIMESTER: ICD-10-CM

## 2024-09-02 DIAGNOSIS — O46.90 VAGINAL BLEEDING IN PREGNANCY: ICD-10-CM

## 2024-09-02 DIAGNOSIS — I10 ESSENTIAL HYPERTENSION: ICD-10-CM

## 2024-09-02 DIAGNOSIS — O41.8X10 SUBCHORIONIC HEMATOMA IN FIRST TRIMESTER: ICD-10-CM

## 2024-09-02 LAB
ABO/RH(D): NORMAL
ALBUMIN UR-MCNC: 10 MG/DL
ANION GAP SERPL CALCULATED.3IONS-SCNC: 15 MMOL/L (ref 7–15)
ANTIBODY SCREEN: NEGATIVE
APPEARANCE UR: CLEAR
BASOPHILS # BLD AUTO: 0 10E3/UL (ref 0–0.2)
BASOPHILS NFR BLD AUTO: 0 %
BILIRUB UR QL STRIP: NEGATIVE
BUN SERPL-MCNC: 8.2 MG/DL (ref 6–20)
CALCIUM SERPL-MCNC: 9.1 MG/DL (ref 8.8–10.4)
CHLORIDE SERPL-SCNC: 101 MMOL/L (ref 98–107)
COLOR UR AUTO: YELLOW
CREAT SERPL-MCNC: 0.56 MG/DL (ref 0.51–0.95)
EGFRCR SERPLBLD CKD-EPI 2021: >90 ML/MIN/1.73M2
EOSINOPHIL # BLD AUTO: 0.1 10E3/UL (ref 0–0.7)
EOSINOPHIL NFR BLD AUTO: 1 %
ERYTHROCYTE [DISTWIDTH] IN BLOOD BY AUTOMATED COUNT: 14.7 % (ref 10–15)
GLUCOSE SERPL-MCNC: 149 MG/DL (ref 70–99)
GLUCOSE UR STRIP-MCNC: NEGATIVE MG/DL
HCG INTACT+B SERPL-ACNC: ABNORMAL MIU/ML
HCG SERPL QL: POSITIVE
HCO3 SERPL-SCNC: 20 MMOL/L (ref 22–29)
HCT VFR BLD AUTO: 39.2 % (ref 35–47)
HGB BLD-MCNC: 13.2 G/DL (ref 11.7–15.7)
HGB UR QL STRIP: NEGATIVE
HOLD SPECIMEN: NORMAL
IMM GRANULOCYTES # BLD: 0.1 10E3/UL
IMM GRANULOCYTES NFR BLD: 1 %
KETONES UR STRIP-MCNC: NEGATIVE MG/DL
LEUKOCYTE ESTERASE UR QL STRIP: NEGATIVE
LYMPHOCYTES # BLD AUTO: 2.2 10E3/UL (ref 0.8–5.3)
LYMPHOCYTES NFR BLD AUTO: 17 %
MCH RBC QN AUTO: 29.9 PG (ref 26.5–33)
MCHC RBC AUTO-ENTMCNC: 33.7 G/DL (ref 31.5–36.5)
MCV RBC AUTO: 89 FL (ref 78–100)
MONOCYTES # BLD AUTO: 0.8 10E3/UL (ref 0–1.3)
MONOCYTES NFR BLD AUTO: 6 %
MUCOUS THREADS #/AREA URNS LPF: PRESENT /LPF
NEUTROPHILS # BLD AUTO: 9.9 10E3/UL (ref 1.6–8.3)
NEUTROPHILS NFR BLD AUTO: 76 %
NITRATE UR QL: NEGATIVE
NRBC # BLD AUTO: 0 10E3/UL
NRBC BLD AUTO-RTO: 0 /100
PH UR STRIP: 6 [PH] (ref 5–7)
PLATELET # BLD AUTO: 334 10E3/UL (ref 150–450)
POTASSIUM SERPL-SCNC: 4.2 MMOL/L (ref 3.4–5.3)
RBC # BLD AUTO: 4.42 10E6/UL (ref 3.8–5.2)
RBC URINE: 5 /HPF
SODIUM SERPL-SCNC: 136 MMOL/L (ref 135–145)
SP GR UR STRIP: 1.02 (ref 1–1.03)
SPECIMEN EXPIRATION DATE: NORMAL
SQUAMOUS EPITHELIAL: <1 /HPF
UROBILINOGEN UR STRIP-MCNC: NORMAL MG/DL
WBC # BLD AUTO: 13.2 10E3/UL (ref 4–11)
WBC URINE: 1 /HPF

## 2024-09-02 PROCEDURE — 250N000013 HC RX MED GY IP 250 OP 250 PS 637: Performed by: PHYSICIAN ASSISTANT

## 2024-09-02 PROCEDURE — 80048 BASIC METABOLIC PNL TOTAL CA: CPT | Performed by: PHYSICIAN ASSISTANT

## 2024-09-02 PROCEDURE — 84703 CHORIONIC GONADOTROPIN ASSAY: CPT | Performed by: PHYSICIAN ASSISTANT

## 2024-09-02 PROCEDURE — 81001 URINALYSIS AUTO W/SCOPE: CPT | Performed by: PHYSICIAN ASSISTANT

## 2024-09-02 PROCEDURE — 85025 COMPLETE CBC W/AUTO DIFF WBC: CPT | Performed by: PHYSICIAN ASSISTANT

## 2024-09-02 PROCEDURE — 84702 CHORIONIC GONADOTROPIN TEST: CPT | Performed by: PHYSICIAN ASSISTANT

## 2024-09-02 PROCEDURE — 36415 COLL VENOUS BLD VENIPUNCTURE: CPT | Performed by: EMERGENCY MEDICINE

## 2024-09-02 PROCEDURE — 76801 OB US < 14 WKS SINGLE FETUS: CPT

## 2024-09-02 PROCEDURE — 85025 COMPLETE CBC W/AUTO DIFF WBC: CPT | Performed by: EMERGENCY MEDICINE

## 2024-09-02 PROCEDURE — 86900 BLOOD TYPING SEROLOGIC ABO: CPT | Performed by: PHYSICIAN ASSISTANT

## 2024-09-02 PROCEDURE — 99284 EMERGENCY DEPT VISIT MOD MDM: CPT | Mod: 25 | Performed by: PHYSICIAN ASSISTANT

## 2024-09-02 RX ORDER — NIFEDIPINE 30 MG/1
30 TABLET, EXTENDED RELEASE ORAL ONCE
Status: COMPLETED | OUTPATIENT
Start: 2024-09-02 | End: 2024-09-02

## 2024-09-02 RX ADMIN — NIFEDIPINE 30 MG: 30 TABLET, FILM COATED, EXTENDED RELEASE ORAL at 12:32

## 2024-09-02 ASSESSMENT — COLUMBIA-SUICIDE SEVERITY RATING SCALE - C-SSRS
6. HAVE YOU EVER DONE ANYTHING, STARTED TO DO ANYTHING, OR PREPARED TO DO ANYTHING TO END YOUR LIFE?: NO
1. IN THE PAST MONTH, HAVE YOU WISHED YOU WERE DEAD OR WISHED YOU COULD GO TO SLEEP AND NOT WAKE UP?: NO
2. HAVE YOU ACTUALLY HAD ANY THOUGHTS OF KILLING YOURSELF IN THE PAST MONTH?: NO

## 2024-09-02 ASSESSMENT — ACTIVITIES OF DAILY LIVING (ADL)
ADLS_ACUITY_SCORE: 35
ADLS_ACUITY_SCORE: 35

## 2024-09-02 NOTE — ED TRIAGE NOTES
Pt is having mild bleeding she is unsure how far along she is exactly last period was in may and she has a prenatal appointment scheduled 9/9/2024.  Pt states that she is also having some mild discomfort in her lower back and buttocks area.  Pt woke up today with some blood on her bed.       Triage Assessment (Adult)       Row Name 09/02/24 1137          Triage Assessment    Airway WDL WDL        Respiratory WDL    Respiratory WDL WDL        Skin Circulation/Temperature WDL    Skin Circulation/Temperature WDL WDL        Cardiac WDL    Cardiac WDL WDL        Peripheral/Neurovascular WDL    Peripheral Neurovascular WDL WDL        Cognitive/Neuro/Behavioral WDL    Cognitive/Neuro/Behavioral WDL WDL

## 2024-09-02 NOTE — ED PROVIDER NOTES
Emergency Department Note      History of Present Illness     Chief Complaint   Vaginal Bleeding - Pregnant      HPI   Shila Batres is a 39 year old female G5, P3 who presents for evaluation of vaginal bleeding.  The patient is pregnant but is unsure exactly how far along as she has not had a prenatal visit yet.  She states her last menstrual period was the end of May or early June.  She presents today because she had some bleeding yesterday afternoon and again this morning when she woke up.  She denies any active bleeding right now and states it slowed down.  She did have a clot yesterday.  She also had some bilateral pelvic cramping and low back cramping but that is resolved.  Denies dysuria or hematuria or fever.  No dizziness or shortness of breath and she is not on blood thinners.  She takes nifedipine at baseline for chronic hypertension but forgot to take it this morning.    Independent Historian   None    Review of External Notes   I/ reviewed Dr. Tee Nguyen OBGYN note from 12/8/23 where pt seen for follow-up after misscairrage.  It looks like at that time plan was to manage passage of miscairrage expectantly.  /  Past Medical History     Medical History and Problem List   Past Medical History:   Diagnosis Date    Benign essential hypertension     Obesity (BMI 30-39.9)        Medications   Blood Pressure Monitor KIT  NIFEdipine ER OSMOTIC (PROCARDIA XL) 30 MG 24 hr tablet  Prenatal Vit-Fe Fumarate-FA (PRENATAL MULTIVITAMIN W/IRON) 27-0.8 MG tablet        Surgical History   Past Surgical History:   Procedure Laterality Date    TYMPANOPLASTY Left 1996       Physical Exam     Patient Vitals for the past 24 hrs:   BP Temp Temp src Pulse Resp SpO2 Height Weight   09/02/24 1337 -- -- -- -- -- 95 % -- --   09/02/24 1336 (!) 130/90 -- -- 82 -- -- -- --   09/02/24 1228 -- -- -- -- -- 94 % -- --   09/02/24 1227 (!) 165/100 -- -- 91 -- -- -- --   09/02/24 1137 (!) 163/99 97.9  F (36.6  C) Temporal 103 18 96  "% 1.575 m (5' 2\") 74.5 kg (164 lb 3.9 oz)     Physical Exam  General: Awake, alert, non-toxic.  Head:  Scalp is NC/AT  Eyes:  Conjunctiva normal, PERRL  ENT:  The external nose and ears are normal.   Neck:  Normal range of motion without rigidity.  CV:  Regular rate and rhythm    No pathologic murmur, rubs, or gallops.  Resp:  Breath sounds are clear bilaterally    Non-labored, no retractions or accessory muscle use  Abdomen: Abdomen is soft, no distension, no tenderness, no masses. No CVA tenderness.  MS:  No lower extremity edema/swelling.  Skin:  Warm and dry, No rash or lesions noted.  Neuro:  Alert and oriented.  GCS 15 Moves all extremities normal.  No facial asymmetry. Gait normal.  Psych:  Awake. Alert. Normal affect. Appropriate interactions.      Diagnostics     Lab Results   Labs Ordered and Resulted from Time of ED Arrival to Time of ED Departure   ROUTINE UA WITH MICROSCOPIC REFLEX TO CULTURE - Abnormal       Result Value    Color Urine Yellow      Appearance Urine Clear      Glucose Urine Negative      Bilirubin Urine Negative      Ketones Urine Negative      Specific Gravity Urine 1.024      Blood Urine Negative      pH Urine 6.0      Protein Albumin Urine 10 (*)     Urobilinogen Urine Normal      Nitrite Urine Negative      Leukocyte Esterase Urine Negative      Mucus Urine Present (*)     RBC Urine 5 (*)     WBC Urine 1      Squamous Epithelials Urine <1     BASIC METABOLIC PANEL - Abnormal    Sodium 136      Potassium 4.2      Chloride 101      Carbon Dioxide (CO2) 20 (*)     Anion Gap 15      Urea Nitrogen 8.2      Creatinine 0.56      GFR Estimate >90      Calcium 9.1      Glucose 149 (*)    HCG QUALITATIVE PREGNANCY - Abnormal    hCG Serum Qualitative Positive (*)    CBC WITH PLATELETS AND DIFFERENTIAL - Abnormal    WBC Count 13.2 (*)     RBC Count 4.42      Hemoglobin 13.2      Hematocrit 39.2      MCV 89      MCH 29.9      MCHC 33.7      RDW 14.7      Platelet Count 334      % Neutrophils 76   "    % Lymphocytes 17      % Monocytes 6      % Eosinophils 1      % Basophils 0      % Immature Granulocytes 1      NRBCs per 100 WBC 0      Absolute Neutrophils 9.9 (*)     Absolute Lymphocytes 2.2      Absolute Monocytes 0.8      Absolute Eosinophils 0.1      Absolute Basophils 0.0      Absolute Immature Granulocytes 0.1      Absolute NRBCs 0.0     HCG QUANTITATIVE PREGNANCY - Abnormal    hCG Quantitative 138,014 (*)    TYPE AND SCREEN, ADULT    ABO/RH(D) O POS      Antibody Screen Negative      SPECIMEN EXPIRATION DATE [id number]     ABO/RH TYPE AND SCREEN       Imaging   US OB < 14 Weeks Single   Final Result   IMPRESSION:    1.  Single living intrauterine gestation at 12 weeks 0 days, EDC 3/17/2025.   2.  Small subchorionic hemorrhage.                  Independent Interpretation   None    ED Course      Medications Administered   Medications   NIFEdipine ER OSMOTIC (PROCARDIA XL) 24 hr tablet 30 mg (30 mg Oral $Given 9/2/24 1235)       Procedures   Procedures     Discussion of Management   None    ED Course        Additional Documentation  None    Medical Decision Making / Diagnosis     CMS Diagnoses: None    MIPS       None    MDM   Shila Batres is a 41 year old female 39 year old female presents for eval of vaginal bleeding in pregnancy.  Workup here demonstrates live intrauterine pregnancy with small subchorionic hemorrhage as the source of this.  Her bleeding is slowing at this time.  There is nothing to suggest ectopic or heterotopic pregnancy.  Vitally stable with normal hemoglobin and platelets no evidence of excessive blood loss or coagulopathy.  She is Rh+ there is no indication for RhoGAM.  Her urine shows microscopic hematuria likely from vaginal bleeding no evidence of infection and very low suspicion for kidney stone.  Labs otherwise notable for mild nonspecific leukocytosis no fever or evidence of infection.  She is hypertensive here on arrival but forgot to take her nifedipine that she  takes for chronic hypertension this morning and this was given to her with improvement in her pressures here.  She is not in date range for preeclampsia regardless and I do not suspect this.  Urged her to continue working to control her blood pressure, no evidence of hypertensive emergency or secondary cause.  She has an appointment with OB scheduled on the ninth in 1 week I will have her call to see if they want to see her sooner.  Return precautions for increased bleeding passage of large clots pain fever or other concerns.    Disposition   The patient was discharged.     Diagnosis     ICD-10-CM    1. Subchorionic hematoma in first trimester  O41.8X10     O46.8X1       2. Vaginal bleeding in pregnancy  O46.90       3. Essential hypertension  I10            Discharge Medications   New Prescriptions    No medications on file              Reed Heath PA-C  09/02/24 1407

## 2024-09-05 ENCOUNTER — TELEPHONE (OUTPATIENT)
Dept: OBGYN | Facility: CLINIC | Age: 39
End: 2024-09-05
Payer: COMMERCIAL

## 2024-09-05 DIAGNOSIS — O20.8 SUBCHORIONIC HEMORRHAGE OF PLACENTA IN FIRST TRIMESTER: Primary | ICD-10-CM

## 2024-09-05 NOTE — TELEPHONE ENCOUNTER
Pt seen in ED on  for Vaginal bleeding  Vaginal bleeding has resolved.  First provider visit with Dr Godoy on .     12w3d      Ultrasound in ED -    IMPRESSION:   1.  Single living intrauterine gestation at 12 weeks 0 days, EDC 3/17/2025.  2.  Small subchorionic hemorrhage.    Pt has ultrasound scheduled on . Do you feel she needs another US for follow up?    Do you recommend any activity restrictions or pelvic rest until she has first MD visit?    Vidya ZAPATA RN  [facility] [place]

## 2024-09-06 PROBLEM — O20.8 SUBCHORIONIC HEMORRHAGE OF PLACENTA IN FIRST TRIMESTER: Status: ACTIVE | Noted: 2024-09-06

## 2024-09-06 NOTE — TELEPHONE ENCOUNTER
Yes, let's have her do the U/S as scheduled to follow-up the HERNAN and reconfirm viability. She should maintain pelvic rest until her OB visit.

## 2024-09-06 NOTE — TELEPHONE ENCOUNTER
Call to pt.     Advised to come for repeat US appt on 9/16 to follow up on HERNAN and to maintain pelvic rest until first provider visit.    Vidya ZAPATA RN  [facility] [place]

## 2024-09-09 ENCOUNTER — VIRTUAL VISIT (OUTPATIENT)
Dept: OBGYN | Facility: CLINIC | Age: 39
End: 2024-09-09
Payer: COMMERCIAL

## 2024-09-09 DIAGNOSIS — O20.8 SUBCHORIONIC HEMORRHAGE OF PLACENTA IN FIRST TRIMESTER: Primary | ICD-10-CM

## 2024-09-09 DIAGNOSIS — O09.91 SUPERVISION OF HIGH RISK PREGNANCY IN FIRST TRIMESTER: ICD-10-CM

## 2024-09-09 PROCEDURE — 99207 PR NO CHARGE NURSE ONLY: CPT | Mod: 93

## 2024-09-09 NOTE — PROGRESS NOTES
"Chief Complaint   Patient presents with    Prenatal Care     New Prenatal Nurse Telephone Visit           13w0d      Initial LMP 2024 (Approximate)  Estimated body mass index is 30.04 kg/m  as calculated from the following:    Height as of 24: 1.575 m (5' 2\").    Weight as of 24: 74.5 kg (164 lb 3.9 oz).  BP completed using cuff size: NA (Not Taken)    Questioned patient about current smoking habits.  Pt. has never smoked.    NPN nurse visit done over the phone. Pt will be given NPN folder and book at her upcoming appt.  Discussed optional screening available to assess chromosomal anomalies. Questions answered. Informed pt of the clinic structure (on-call does deliveries for the day, may be male or female doctor). Pt advised to call the clinic if she has any questions or concerns related to her pregnancy. Prenatal labs will be obtained at her upcoming appt. New prenatal visit scheduled on 24 with   Dr Godoy.        Menstrual cycles: irregular  Date of positive pregnancy test: ~6 wks ago  Medications stopped upon pos HPT: none    Last pap: 23        Patient supplied answers from flow sheet for:  Prenatal OB Questionnaire.  Past Medical History  Have you ever recieved care for your mental health? : No  Have you ever been in a major accident or suffered serious trauma?: No  Within the last year, has anyone hit, slapped, kicked or otherwise hurt you?: No  In the last year, has anyone forced you to have sex when you didn't want to?: No    Past Medical History 2   Have you ever received a blood transfusion?: No  Would you accept a blood transfusion if was medically recommended?: Yes  Does anyone in your home smoke?: No   Is your blood type Rh negative?: No  Have you ever ?: (!) Yes  Have you been hospitalized for a nonsurgical reason excluding normal delivery?: No  Have you ever had an abnormal pap smear?: No    Past Medical History (Continued)  Do you have a history of abnormalities " of the uterus?: No  Did your mother take HOMERO or any other hormones when she was pregnant with you?: Unknown  Do you have any other problems we have not asked about which you feel may be important to this pregnancy?: No       Malia Barnes RN

## 2024-09-11 ENCOUNTER — OFFICE VISIT (OUTPATIENT)
Dept: INTERNAL MEDICINE | Facility: CLINIC | Age: 39
End: 2024-09-11
Payer: COMMERCIAL

## 2024-09-11 VITALS
OXYGEN SATURATION: 98 % | BODY MASS INDEX: 30 KG/M2 | WEIGHT: 164 LBS | HEART RATE: 56 BPM | TEMPERATURE: 98.1 F | RESPIRATION RATE: 20 BRPM

## 2024-09-11 DIAGNOSIS — Z12.31 VISIT FOR SCREENING MAMMOGRAM: ICD-10-CM

## 2024-09-11 DIAGNOSIS — I10 ESSENTIAL HYPERTENSION: Primary | ICD-10-CM

## 2024-09-11 DIAGNOSIS — Z3A.13 13 WEEKS GESTATION OF PREGNANCY: ICD-10-CM

## 2024-09-11 DIAGNOSIS — Z12.4 CERVICAL CANCER SCREENING: ICD-10-CM

## 2024-09-11 PROCEDURE — 90656 IIV3 VACC NO PRSV 0.5 ML IM: CPT | Performed by: INTERNAL MEDICINE

## 2024-09-11 PROCEDURE — 90471 IMMUNIZATION ADMIN: CPT | Performed by: INTERNAL MEDICINE

## 2024-09-11 PROCEDURE — 99213 OFFICE O/P EST LOW 20 MIN: CPT | Mod: 25 | Performed by: INTERNAL MEDICINE

## 2024-09-11 RX ORDER — NIFEDIPINE 30 MG/1
30 TABLET, EXTENDED RELEASE ORAL DAILY
Qty: 90 TABLET | Refills: 0 | Status: SHIPPED | OUTPATIENT
Start: 2024-09-11

## 2024-09-11 ASSESSMENT — PAIN SCALES - GENERAL: PAINLEVEL: NO PAIN (0)

## 2024-09-11 NOTE — PROGRESS NOTES
"  Assessment & Plan         Essential hypertension  Continue nifedipine at current dose of 30 mg  Lipid panel ordered   monitor home blood pressure weekly    13 weeks gestation of pregnancy  Stable, has OB follow-up        The longitudinal plan of care for the diagnosis(es)/condition(s) as documented were addressed during this visit. Due to the added complexity in care, I will continue to support Shila in the subsequent management and with ongoing continuity of care.      BMI  Estimated body mass index is 30 kg/m  as calculated from the following:    Height as of 9/2/24: 1.575 m (5' 2\").    Weight as of this encounter: 74.4 kg (164 lb).       Follow-up in 3 months.       Subjective   Shila is a 39 year old, presenting for the following health issues:  Hypertension and Recheck Medication        9/11/2024    10:05 AM   Additional Questions   Roomed by Arelis   Accompanied by baby     History of Present Illness       Hypertension: She presents for follow up of hypertension.  She does not check blood pressure  regularly outside of the clinic. Outside blood pressures have been over 140/90. She follows a low salt diet.     She eats 2-3 servings of fruits and vegetables daily.She consumes 2 sweetened beverage(s) daily.She exercises with enough effort to increase her heart rate 20 to 29 minutes per day.  She exercises with enough effort to increase her heart rate 3 or less days per week.   She is taking medications regularly.         Medication Followup of NIFEdipine ER OSMOTIC  Taking Medication as prescribed: yes  Side Effects:  None  Medication Helping Symptoms:  not applicable          Patient has had hypertension for 2 years.  Was previously taking losartan but that medication was changed to nifedipine as she is pregnant .    She is currently in her 13th week of pregnancy and denies any problems.  She did not take her blood pressure medication today.        Review of Systems  Constitutional, neuro, ENT, endocrine, " pulmonary, cardiac, gastrointestinal, genitourinary, musculoskeletal, integument and psychiatric systems are negative, except as otherwise noted.      Objective    Pulse 56   Temp 98.1  F (36.7  C) (Temporal)   Resp 20   Wt 74.4 kg (164 lb)   LMP 02/20/2024 (Approximate)   SpO2 98%   Breastfeeding No   BMI 30.00 kg/m    Body mass index is 30 kg/m .  Physical Exam   GENERAL: alert and no distress  NECK: no adenopathy, no asymmetry, masses, or scars  RESP: lungs clear to auscultation - no rales, rhonchi or wheezes  CV: regular rate and rhythm, normal S1 S2, no S3 or S4, no murmur, click or rub, no peripheral edema  ABDOMEN: soft, nontender, no hepatosplenomegaly, no masses and bowel sounds normal  MS: no gross musculoskeletal defects noted, no edema            Signed Electronically by: Roxane Esparza MD

## 2024-09-16 ENCOUNTER — LAB (OUTPATIENT)
Dept: LAB | Facility: CLINIC | Age: 39
End: 2024-09-16
Payer: COMMERCIAL

## 2024-09-16 ENCOUNTER — ANCILLARY PROCEDURE (OUTPATIENT)
Dept: ULTRASOUND IMAGING | Facility: CLINIC | Age: 39
End: 2024-09-16
Payer: COMMERCIAL

## 2024-09-16 DIAGNOSIS — O09.91 SUPERVISION OF HIGH RISK PREGNANCY IN FIRST TRIMESTER: ICD-10-CM

## 2024-09-16 DIAGNOSIS — O20.8 SUBCHORIONIC HEMORRHAGE OF PLACENTA IN FIRST TRIMESTER: ICD-10-CM

## 2024-09-16 DIAGNOSIS — I10 ESSENTIAL HYPERTENSION: ICD-10-CM

## 2024-09-16 DIAGNOSIS — O16.2 HYPERTENSION AFFECTING PREGNANCY IN SECOND TRIMESTER: ICD-10-CM

## 2024-09-16 LAB
CHOLEST SERPL-MCNC: 205 MG/DL
FASTING STATUS PATIENT QL REPORTED: NO
HBV SURFACE AG SERPL QL IA: NONREACTIVE
HCV AB SERPL QL IA: NONREACTIVE
HDLC SERPL-MCNC: 59 MG/DL
HIV 1+2 AB+HIV1 P24 AG SERPL QL IA: NONREACTIVE
LDLC SERPL CALC-MCNC: 106 MG/DL
NONHDLC SERPL-MCNC: 146 MG/DL
TRIGL SERPL-MCNC: 198 MG/DL

## 2024-09-16 PROCEDURE — 82565 ASSAY OF CREATININE: CPT

## 2024-09-16 PROCEDURE — 84550 ASSAY OF BLOOD/URIC ACID: CPT

## 2024-09-16 PROCEDURE — 86803 HEPATITIS C AB TEST: CPT

## 2024-09-16 PROCEDURE — 87389 HIV-1 AG W/HIV-1&-2 AB AG IA: CPT

## 2024-09-16 PROCEDURE — 84520 ASSAY OF UREA NITROGEN: CPT

## 2024-09-16 PROCEDURE — 36415 COLL VENOUS BLD VENIPUNCTURE: CPT | Performed by: INTERNAL MEDICINE

## 2024-09-16 PROCEDURE — 80061 LIPID PANEL: CPT | Mod: GY | Performed by: INTERNAL MEDICINE

## 2024-09-16 PROCEDURE — 87086 URINE CULTURE/COLONY COUNT: CPT

## 2024-09-16 PROCEDURE — 76817 TRANSVAGINAL US OBSTETRIC: CPT | Performed by: OBSTETRICS & GYNECOLOGY

## 2024-09-16 PROCEDURE — 84460 ALANINE AMINO (ALT) (SGPT): CPT

## 2024-09-16 PROCEDURE — 86780 TREPONEMA PALLIDUM: CPT

## 2024-09-16 PROCEDURE — 87340 HEPATITIS B SURFACE AG IA: CPT

## 2024-09-16 PROCEDURE — 86762 RUBELLA ANTIBODY: CPT

## 2024-09-16 PROCEDURE — 76805 OB US >/= 14 WKS SNGL FETUS: CPT | Performed by: OBSTETRICS & GYNECOLOGY

## 2024-09-16 PROCEDURE — 84450 TRANSFERASE (AST) (SGOT): CPT

## 2024-09-17 ENCOUNTER — PRENATAL OFFICE VISIT (OUTPATIENT)
Dept: OBGYN | Facility: CLINIC | Age: 39
End: 2024-09-17
Payer: COMMERCIAL

## 2024-09-17 VITALS
HEIGHT: 62 IN | SYSTOLIC BLOOD PRESSURE: 122 MMHG | BODY MASS INDEX: 30.36 KG/M2 | WEIGHT: 165 LBS | DIASTOLIC BLOOD PRESSURE: 78 MMHG

## 2024-09-17 DIAGNOSIS — O09.299 HISTORY OF GESTATIONAL DIABETES IN PRIOR PREGNANCY, CURRENTLY PREGNANT: ICD-10-CM

## 2024-09-17 DIAGNOSIS — Z12.4 SCREENING FOR MALIGNANT NEOPLASM OF CERVIX: ICD-10-CM

## 2024-09-17 DIAGNOSIS — O16.2 HYPERTENSION AFFECTING PREGNANCY IN SECOND TRIMESTER: ICD-10-CM

## 2024-09-17 DIAGNOSIS — Z86.32 HISTORY OF GESTATIONAL DIABETES IN PRIOR PREGNANCY, CURRENTLY PREGNANT: ICD-10-CM

## 2024-09-17 DIAGNOSIS — Z11.3 SCREEN FOR STD (SEXUALLY TRANSMITTED DISEASE): ICD-10-CM

## 2024-09-17 DIAGNOSIS — O20.8 SUBCHORIONIC HEMORRHAGE OF PLACENTA IN FIRST TRIMESTER: ICD-10-CM

## 2024-09-17 DIAGNOSIS — O09.522 MULTIGRAVIDA OF ADVANCED MATERNAL AGE IN SECOND TRIMESTER: Primary | ICD-10-CM

## 2024-09-17 PROBLEM — O09.529 AMA (ADVANCED MATERNAL AGE) MULTIGRAVIDA 35+: Status: ACTIVE | Noted: 2024-09-17

## 2024-09-17 PROBLEM — O16.9 HYPERTENSION AFFECTING PREGNANCY: Status: ACTIVE | Noted: 2024-09-17

## 2024-09-17 LAB
ALT SERPL W P-5'-P-CCNC: 27 U/L (ref 0–50)
AST SERPL W P-5'-P-CCNC: 27 U/L (ref 0–45)
BACTERIA UR CULT: NO GROWTH
BUN SERPL-MCNC: 6.7 MG/DL (ref 6–20)
CREAT SERPL-MCNC: 0.55 MG/DL (ref 0.51–0.95)
EGFRCR SERPLBLD CKD-EPI 2021: >90 ML/MIN/1.73M2
RUBV IGG SERPL QL IA: 2.05 INDEX
RUBV IGG SERPL QL IA: POSITIVE
T PALLIDUM AB SER QL: NONREACTIVE
URATE SERPL-MCNC: 2.6 MG/DL (ref 2.4–5.7)

## 2024-09-17 PROCEDURE — 36415 COLL VENOUS BLD VENIPUNCTURE: CPT | Performed by: OBSTETRICS & GYNECOLOGY

## 2024-09-17 PROCEDURE — 87624 HPV HI-RISK TYP POOLED RSLT: CPT | Performed by: OBSTETRICS & GYNECOLOGY

## 2024-09-17 PROCEDURE — 84156 ASSAY OF PROTEIN URINE: CPT | Performed by: OBSTETRICS & GYNECOLOGY

## 2024-09-17 PROCEDURE — 99214 OFFICE O/P EST MOD 30 MIN: CPT | Performed by: OBSTETRICS & GYNECOLOGY

## 2024-09-17 PROCEDURE — 87591 N.GONORRHOEAE DNA AMP PROB: CPT | Performed by: OBSTETRICS & GYNECOLOGY

## 2024-09-17 PROCEDURE — G0145 SCR C/V CYTO,THINLAYER,RESCR: HCPCS | Performed by: OBSTETRICS & GYNECOLOGY

## 2024-09-17 PROCEDURE — 87491 CHLMYD TRACH DNA AMP PROBE: CPT | Performed by: OBSTETRICS & GYNECOLOGY

## 2024-09-17 RX ORDER — ASPIRIN 81 MG/1
81 TABLET ORAL DAILY
Qty: 90 TABLET | Refills: 3 | Status: SHIPPED | OUTPATIENT
Start: 2024-09-17

## 2024-09-17 NOTE — NURSING NOTE
"Chief Complaint   Patient presents with    Prenatal Care     14 weeks 1 day- no concerns        Initial /78 (BP Location: Right arm, Patient Position: Sitting, Cuff Size: Adult Regular)   Ht 1.575 m (5' 2\")   Wt 74.8 kg (165 lb)   LMP 06/10/2024   BMI 30.18 kg/m   Estimated body mass index is 30.18 kg/m  as calculated from the following:    Height as of this encounter: 1.575 m (5' 2\").    Weight as of this encounter: 74.8 kg (165 lb).  BP completed using cuff size: regular    Questioned patient about current smoking habits.  Pt. has never smoked.          The following HM Due: NONE    14w1d  Hipolito Lazar CMA                "

## 2024-09-17 NOTE — PROGRESS NOTES
This is a 39 year old female patient,   who presents for her first obstetrical visit. This pregnancy is Planned, Desired.    EDC Mar 17, 2025 by Previous US which makes her 14w1d  today.  Her cycles are irregular.  Her last menstrual period was abnormal. Since her LMP, she has had no complaints.  She denies vaginal discharge, pelvic pain, and vaginal bleeding. Ultrasound in the 1st trimester showed EDC inconsistent with dates by LMP.     OB History    Para Term  AB Living   5 3 3 0 1 3   SAB IAB Ectopic Multiple Live Births   1 0 0 0 3      # Outcome Date GA Lbr Hever/2nd Weight Sex Type Anes PTL Lv   5 Current            4 SAB 2023 8w0d          3 Term 03/15/10 41w0d  3.175 kg (7 lb) M Vag-Spont   CAESAR   2 Term 06 41w0d  3.175 kg (7 lb) F    CAESAR   1 Term 99 41w0d  3.175 kg (7 lb) F    CAESAR       History of GDM: Yes - 3rd preg Class A1,  PTL : No,  History of HTN in pregnancy: Yes - CHTN - on Nifedipine,  Thrombocytopenia: No,  Shoulder dystocia: No,  Vacuum Extraction: No  PPH: No   3rd of 4th degree laceration: No.   Other complications: No      Since her last LMP she denies use of alcohol, tobacco and street drugs.    HISTORY:  Past Medical History:   Diagnosis Date    Benign essential hypertension     Gestational diabetes         Obesity (BMI 30-39.9)      Past Surgical History:   Procedure Laterality Date    TYMPANOPLASTY Left      Family History   Problem Relation Age of Onset    Diabetes Type 2  Mother     Hypertension Mother     Hyperlipidemia Mother     Lupus Sister     Unknown/Adopted Father     Myocardial Infarction No family hx of     Cerebrovascular Disease No family hx of     Coronary Artery Disease Early Onset No family hx of     Breast Cancer No family hx of     Ovarian Cancer No family hx of     Colon Cancer No family hx of      Social History     Socioeconomic History    Marital status: Single     Spouse name: Efra Martin    Number of children: 3     "Highest education level: 10th grade   Occupational History    Occupation: Payroll   Tobacco Use    Smoking status: Never    Smokeless tobacco: Never   Vaping Use    Vaping status: Never Used   Substance and Sexual Activity    Alcohol use: Not Currently    Drug use: No    Sexual activity: Yes     Partners: Male   Social History Narrative    Single.    3 kids (18, 11, and 8 as of 2018).    1 grandchild (as of 2018).      Current Outpatient Medications   Medication Sig Dispense Refill    NIFEdipine ER OSMOTIC (PROCARDIA XL) 30 MG 24 hr tablet Take 1 tablet (30 mg) by mouth daily. 90 tablet 0    Prenatal Vit-Fe Fumarate-FA (PRENATAL MULTIVITAMIN W/IRON) 27-0.8 MG tablet Take 1 tablet by mouth daily 90 tablet 3     No current facility-administered medications for this visit.     Allergies   Allergen Reactions    Nkda [No Known Drug Allergy]        Past medical, surgical, social and family history were reviewed and updated in EPIC.    ROS:   12 point review of systems negative other than symptoms noted below.    EXAM:  /78 (BP Location: Right arm, Patient Position: Sitting, Cuff Size: Adult Regular)   Ht 1.575 m (5' 2\")   Wt 74.8 kg (165 lb)   LMP 06/10/2024   BMI 30.18 kg/m     BMI: There is no height or weight on file to calculate BMI.    EXAM:  Constitutional: Appearance: Well nourished, well developed alert, in no acute distress  Chest:  Respiratory Effort:  Breathing unlabored  Cardiovascular:Heart    Auscultation:  Regular rate, normal rhythm, no murmurs present  Gastrointestinal:  Abdominal Examination:  Abdomen nontender to palpation, tone normal without     rigidity or guarding, no masses present, umbilicus without lesions    Liver and speen:  No hepatomegaly present, liver nontender to palpation    Hernias:  No hernias present    FHTs auscultated at 145.  Skin:  General Inspection:  No rashes present, no lesions present, no areas of  discoloration.  Neurologic/Psychiatric:    Mental Status:  Oriented " X3       Pelvis: External genitalia, Bartholin, urethral, and [place] glands within normal limits. Urethra is without lesion and nontender to palpation. Bladder is nontender. On speculum exam, cervix is without lesion and vagina is normal without lesion or discharge. Pap smear, GC/Chlam obtained without incident.    ASSESSMENT:      ICD-10-CM    1. Multigravida of advanced maternal age in second trimester  O09.522 Mat Fetal Med Ctr Referral - Pregnancy     [facility] Non-Invasive Prenatal Screening-Prequel      2. Hypertension affecting pregnancy in second trimester  O16.2 aspirin 81 MG EC tablet     ALT     AST     Creatinine     Uric acid     Urea nitrogen     Protein  random urine      3. History of gestational diabetes in prior pregnancy, currently pregnant  O09.299     Z86.32       4. Subchorionic hemorrhage of placenta in first trimester  O20.8       5. Screening for malignant neoplasm of cervix  Z12.4 HPV and Gynecologic Cytology Panel - Recommended Age 30-65 Years      6. Screen for STD (sexually transmitted disease)  Z11.3 NEISSERIA GONORRHOEA PCR     CHLAMYDIA TRACHOMATIS PCR          PLAN:    Prenatal labs reviewed. She has no questions.    Pap/HPV done. If all neg, next due 3 years. If any abnl, follow-up per [facility].    GC/Chlam done.    Check PreE labs (less CBC which was already WNL).    Start baby ASA.  Continue Nifedipine.    Discussed options for screening for and diagnosis of chromosomal anomalies, including first trimester screen, noninvasive prenatal testing/cell-free fetal DNA testing, CVS/amniocentesis, quad screen, and ultrasound or comprehensive Level II US at 18-20 weeks. She is electing noninvasive prenatal testing today, MsAFP at next visit, and Level 2 U/S.    Reviewed early pregnancy education, diet, exercise, prenatal vitamins, intercourse. Reviewed the call schedule, labor and delivery, and the schedule of prenatal visits.    RTC 3 weeks. She is encouraged to call sooner with questions or  concerns.      Yfn Godoy MD  [facility]

## 2024-09-18 ENCOUNTER — TRANSCRIBE ORDERS (OUTPATIENT)
Dept: MATERNAL FETAL MEDICINE | Facility: CLINIC | Age: 39
End: 2024-09-18
Payer: COMMERCIAL

## 2024-09-18 DIAGNOSIS — O26.90 PREGNANCY RELATED CONDITION, ANTEPARTUM: Primary | ICD-10-CM

## 2024-09-18 LAB
ALBUMIN MFR UR ELPH: 13.3 MG/DL
C TRACH DNA SPEC QL NAA+PROBE: NEGATIVE
CREAT UR-MCNC: 153 MG/DL
HPV HR 12 DNA CVX QL NAA+PROBE: NEGATIVE
HPV16 DNA CVX QL NAA+PROBE: NEGATIVE
HPV18 DNA CVX QL NAA+PROBE: NEGATIVE
HUMAN PAPILLOMA VIRUS FINAL DIAGNOSIS: NORMAL
N GONORRHOEA DNA SPEC QL NAA+PROBE: NEGATIVE
PROT/CREAT 24H UR: 0.09 MG/MG CR (ref 0–0.2)

## 2024-09-23 LAB
BKR AP ASSOCIATED HPV REPORT: NORMAL
BKR LAB AP GYN ADEQUACY: NORMAL
BKR LAB AP GYN INTERPRETATION: NORMAL
BKR LAB AP LMP: NORMAL
BKR LAB AP PREVIOUS ABNORMAL: NORMAL
PATH REPORT.COMMENTS IMP SPEC: NORMAL
PATH REPORT.COMMENTS IMP SPEC: NORMAL
PATH REPORT.RELEVANT HX SPEC: NORMAL

## 2024-09-26 LAB — SCANNED LAB RESULT: NORMAL

## 2024-10-08 ENCOUNTER — PRE VISIT (OUTPATIENT)
Dept: MATERNAL FETAL MEDICINE | Facility: CLINIC | Age: 39
End: 2024-10-08

## 2024-10-08 ENCOUNTER — PRENATAL OFFICE VISIT (OUTPATIENT)
Dept: OBGYN | Facility: CLINIC | Age: 39
End: 2024-10-08

## 2024-10-08 VITALS — DIASTOLIC BLOOD PRESSURE: 70 MMHG | WEIGHT: 168 LBS | SYSTOLIC BLOOD PRESSURE: 128 MMHG | BODY MASS INDEX: 30.73 KG/M2

## 2024-10-08 DIAGNOSIS — O09.522 MULTIGRAVIDA OF ADVANCED MATERNAL AGE IN SECOND TRIMESTER: ICD-10-CM

## 2024-10-08 DIAGNOSIS — O09.299 HISTORY OF GESTATIONAL DIABETES IN PRIOR PREGNANCY, CURRENTLY PREGNANT: ICD-10-CM

## 2024-10-08 DIAGNOSIS — Z86.32 HISTORY OF GESTATIONAL DIABETES IN PRIOR PREGNANCY, CURRENTLY PREGNANT: ICD-10-CM

## 2024-10-08 DIAGNOSIS — O16.2 HYPERTENSION AFFECTING PREGNANCY IN SECOND TRIMESTER: Primary | ICD-10-CM

## 2024-10-08 DIAGNOSIS — O20.8 SUBCHORIONIC HEMORRHAGE OF PLACENTA IN FIRST TRIMESTER: ICD-10-CM

## 2024-10-08 PROCEDURE — 99000 SPECIMEN HANDLING OFFICE-LAB: CPT | Performed by: OBSTETRICS & GYNECOLOGY

## 2024-10-08 PROCEDURE — 36415 COLL VENOUS BLD VENIPUNCTURE: CPT | Performed by: OBSTETRICS & GYNECOLOGY

## 2024-10-08 PROCEDURE — 82105 ALPHA-FETOPROTEIN SERUM: CPT | Mod: 90 | Performed by: OBSTETRICS & GYNECOLOGY

## 2024-10-08 PROCEDURE — 99207 PR COMPLICATED OB VISIT: CPT | Performed by: OBSTETRICS & GYNECOLOGY

## 2024-10-08 NOTE — NURSING NOTE
"Chief Complaint   Patient presents with    Prenatal Care     17 weeks 1 day- no concerns        Initial /70 (BP Location: Right arm, Patient Position: Sitting, Cuff Size: Adult Regular)   Wt 76.2 kg (168 lb)   LMP 06/10/2024   BMI 30.73 kg/m   Estimated body mass index is 30.73 kg/m  as calculated from the following:    Height as of 24: 1.575 m (5' 2\").    Weight as of this encounter: 76.2 kg (168 lb).  BP completed using cuff size: regular    Questioned patient about current smoking habits.  Pt. has never smoked.          The following HM Due: NONE    17w1d  Hipolito Lazar CMA              "

## 2024-10-08 NOTE — PROGRESS NOTES
No c/o's. MsAFP today. Lvl 2 U/S scheduled. On Nifedipine and baby ASA. RTC 3 weeks.    Encounter Diagnoses   Name Primary?    Hypertension affecting pregnancy in second trimester Yes    Multigravida of advanced maternal age in second trimester     History of gestational diabetes in prior pregnancy, currently pregnant     Subchorionic hemorrhage of placenta in first trimester        Risk factors listed above are stable and being addressed as noted.    Yfn Godoy MD  [facility]

## 2024-10-10 LAB
# FETUSES US: NORMAL
AFP MOM SERPL: 1.02
AFP SERPL-MCNC: 38 NG/ML
AGE - REPORTED: 42 YR
CURRENT SMOKER: NO
FAMILY MEMBER DISEASES HX: NORMAL
GA METHOD: NORMAL
GA: NORMAL WK
IDDM PATIENT QL: NO
INTEGRATED SCN PATIENT-IMP: NORMAL
SPECIMEN DRAWN SERPL: NORMAL

## 2024-10-16 ENCOUNTER — HOSPITAL ENCOUNTER (OUTPATIENT)
Dept: ULTRASOUND IMAGING | Facility: CLINIC | Age: 39
Discharge: HOME | End: 2024-10-16
Attending: STUDENT IN AN ORGANIZED HEALTH CARE EDUCATION/TRAINING PROGRAM
Payer: MEDICAID

## 2024-10-16 ENCOUNTER — OFFICE VISIT (OUTPATIENT)
Dept: MATERNAL FETAL MEDICINE | Facility: CLINIC | Age: 39
End: 2024-10-16
Attending: STUDENT IN AN ORGANIZED HEALTH CARE EDUCATION/TRAINING PROGRAM
Payer: MEDICAID

## 2024-10-16 DIAGNOSIS — O26.90 PREGNANCY RELATED CONDITION, ANTEPARTUM: ICD-10-CM

## 2024-10-16 DIAGNOSIS — O09.529 ANTEPARTUM MULTIGRAVIDA OF ADVANCED MATERNAL AGE: ICD-10-CM

## 2024-10-16 DIAGNOSIS — O10.919 CHRONIC HYPERTENSION IN PREGNANCY: Primary | ICD-10-CM

## 2024-10-16 PROBLEM — O34.80 OVARIAN CYST DURING PREGNANCY: Status: ACTIVE | Noted: 2024-10-16

## 2024-10-16 PROBLEM — N83.209 OVARIAN CYST DURING PREGNANCY: Status: ACTIVE | Noted: 2024-10-16

## 2024-10-16 PROCEDURE — G0463 HOSPITAL OUTPT CLINIC VISIT: HCPCS | Mod: 25 | Performed by: STUDENT IN AN ORGANIZED HEALTH CARE EDUCATION/TRAINING PROGRAM

## 2024-10-16 PROCEDURE — 99203 OFFICE O/P NEW LOW 30 MIN: CPT | Mod: 25 | Performed by: STUDENT IN AN ORGANIZED HEALTH CARE EDUCATION/TRAINING PROGRAM

## 2024-10-16 PROCEDURE — 76811 OB US DETAILED SNGL FETUS: CPT | Mod: 26 | Performed by: STUDENT IN AN ORGANIZED HEALTH CARE EDUCATION/TRAINING PROGRAM

## 2024-10-16 PROCEDURE — 76811 OB US DETAILED SNGL FETUS: CPT

## 2024-10-16 NOTE — NURSING NOTE
Patient presents to [facility] for L2 US at 18w2d due to AMA >40 years old, CHTN on medications. Denies LOF, vaginal bleeding, cramping/contractions. SBAR given to CATHERINE MD, see their note in Epic.

## 2024-10-16 NOTE — PROGRESS NOTES
"Please see \"Imaging\" tab under \"Chart Review\" for details of today's visit.    Hayde Bustos    "

## 2024-11-01 ENCOUNTER — PRENATAL OFFICE VISIT (OUTPATIENT)
Dept: OBGYN | Facility: CLINIC | Age: 39
End: 2024-11-01
Payer: MEDICAID

## 2024-11-01 VITALS — WEIGHT: 172 LBS | BODY MASS INDEX: 31.46 KG/M2 | DIASTOLIC BLOOD PRESSURE: 78 MMHG | SYSTOLIC BLOOD PRESSURE: 132 MMHG

## 2024-11-01 DIAGNOSIS — O20.8 SUBCHORIONIC HEMORRHAGE OF PLACENTA IN FIRST TRIMESTER: ICD-10-CM

## 2024-11-01 DIAGNOSIS — N83.209 OVARIAN CYST DURING PREGNANCY IN SECOND TRIMESTER: ICD-10-CM

## 2024-11-01 DIAGNOSIS — O16.2 HYPERTENSION AFFECTING PREGNANCY IN SECOND TRIMESTER: ICD-10-CM

## 2024-11-01 DIAGNOSIS — O09.529 ANTEPARTUM MULTIGRAVIDA OF ADVANCED MATERNAL AGE: Primary | ICD-10-CM

## 2024-11-01 DIAGNOSIS — Z86.32 HISTORY OF GESTATIONAL DIABETES IN PRIOR PREGNANCY, CURRENTLY PREGNANT: ICD-10-CM

## 2024-11-01 DIAGNOSIS — O09.299 HISTORY OF GESTATIONAL DIABETES IN PRIOR PREGNANCY, CURRENTLY PREGNANT: ICD-10-CM

## 2024-11-01 DIAGNOSIS — O34.82 OVARIAN CYST DURING PREGNANCY IN SECOND TRIMESTER: ICD-10-CM

## 2024-11-01 PROCEDURE — 91320 SARSCV2 VAC 30MCG TRS-SUC IM: CPT | Performed by: OBSTETRICS & GYNECOLOGY

## 2024-11-01 PROCEDURE — 99207 PR COMPLICATED OB VISIT: CPT | Performed by: OBSTETRICS & GYNECOLOGY

## 2024-11-01 PROCEDURE — 90480 ADMN SARSCOV2 VAC 1/ONLY CMP: CPT | Performed by: OBSTETRICS & GYNECOLOGY

## 2024-11-01 NOTE — NURSING NOTE
"Chief Complaint   Patient presents with    Prenatal Care     20w 4d       Initial /78   Wt 78 kg (172 lb)   LMP 06/10/2024   BMI 31.46 kg/m   Estimated body mass index is 31.46 kg/m  as calculated from the following:    Height as of 24: 1.575 m (5' 2\").    Weight as of this encounter: 78 kg (172 lb).  BP completed using cuff size: regular    Questioned patient about current smoking habits.  Pt. has never smoked.          The following HM Due: NONE    Faviola Schafer LPN on 2024 at 3:35 PM           "

## 2024-11-01 NOTE — PROGRESS NOTES
No c/o's. COVID shot today. On Nifedipine and baby ASA. Lvl 2 U/S WNL exc 3 cm maternal simple right ovarian cyst. Has follow-up MFM U/S at 28 weeks.  labor/Premature rupture of membranes precautions reviewed.  RTC 4 weeks.    Encounter Diagnoses   Name Primary?    Antepartum multigravida of advanced maternal age Yes    Hypertension affecting pregnancy in second trimester     History of gestational diabetes in prior pregnancy, currently pregnant     Subchorionic hemorrhage of placenta in first trimester     Ovarian cyst during pregnancy in second trimester        Risk factors listed above are stable and being addressed as noted.    Yfn Godoy MD  [facility] [place]

## 2024-12-11 ENCOUNTER — OFFICE VISIT (OUTPATIENT)
Dept: INTERNAL MEDICINE | Facility: CLINIC | Age: 39
End: 2024-12-11
Payer: COMMERCIAL

## 2024-12-11 VITALS
SYSTOLIC BLOOD PRESSURE: 125 MMHG | BODY MASS INDEX: 32.87 KG/M2 | TEMPERATURE: 97.4 F | HEART RATE: 91 BPM | WEIGHT: 178.6 LBS | OXYGEN SATURATION: 96 % | DIASTOLIC BLOOD PRESSURE: 87 MMHG | HEIGHT: 62 IN

## 2024-12-11 DIAGNOSIS — Z3A.26 26 WEEKS GESTATION OF PREGNANCY: ICD-10-CM

## 2024-12-11 DIAGNOSIS — I10 ESSENTIAL HYPERTENSION: Primary | ICD-10-CM

## 2024-12-11 PROCEDURE — G2211 COMPLEX E/M VISIT ADD ON: HCPCS | Performed by: INTERNAL MEDICINE

## 2024-12-11 PROCEDURE — 99213 OFFICE O/P EST LOW 20 MIN: CPT | Performed by: INTERNAL MEDICINE

## 2024-12-11 RX ORDER — NIFEDIPINE 30 MG/1
30 TABLET, EXTENDED RELEASE ORAL DAILY
Qty: 90 TABLET | Refills: 1 | Status: SHIPPED | OUTPATIENT
Start: 2024-12-11

## 2024-12-11 NOTE — PROGRESS NOTES
"  Assessment & Plan     Essential hypertension  26 weeks gestation of pregnancy  -Continue Nifedipine  Follow up after delivery            BMI  Estimated body mass index is 32.67 kg/m  as calculated from the following:    Height as of this encounter: 1.575 m (5' 2\").    Weight as of this encounter: 81 kg (178 lb 9.6 oz).         Follow up in 6 months.    The longitudinal plan of care for the diagnosis(es)/condition(s) as documented were addressed during this visit. Due to the added complexity in care, I will continue to support Shila in the subsequent management and with ongoing continuity of care.      Subjective   Shila is a 39 year old, presenting for the following health issues:  Hypertension      History of Present Illness       Hypertension: She presents for follow up of hypertension.  She does not check blood pressure  regularly outside of the clinic. Outpatient blood pressures have not been over 140/90. She follows a low salt diet.     She eats 2-3 servings of fruits and vegetables daily.She consumes 3 sweetened beverage(s) daily.She exercises with enough effort to increase her heart rate 30 to 60 minutes per day.  She exercises with enough effort to increase her heart rate 4 days per week.   She is taking medications regularly.        Tolerating Nifedipine well.     Also on Aspirin now to prevent Preeclampsia.    She is in her 26 th week of pregnancy right now.      She is doing fine and denies any problems.            Review of Systems  Constitutional, HEENT, cardiovascular, pulmonary, gi and gu systems are negative, except as otherwise noted.      Objective    /87   Pulse 91   Temp 97.4  F (36.3  C) (Temporal)   Ht 1.575 m (5' 2\")   Wt 81 kg (178 lb 9.6 oz)   LMP 06/10/2024   SpO2 96%   BMI 32.67 kg/m    Body mass index is 32.67 kg/m .  Physical Exam   GENERAL: alert and no distress  NECK: no adenopathy, no asymmetry, masses, or scars  RESP: lungs clear to auscultation - no rales, rhonchi " or wheezes  CV: regular rate and rhythm, normal S1 S2, no S3 or S4, no murmur, click or rub, no peripheral edema  ABDOMEN: soft, nontender, no hepatosplenomegaly, no masses and bowel sounds normal  MS: no gross musculoskeletal defects noted, no edema            Signed Electronically by: Roxane Esparza MD

## 2024-12-27 ENCOUNTER — HOSPITAL ENCOUNTER (OUTPATIENT)
Dept: ULTRASOUND IMAGING | Facility: CLINIC | Age: 39
Discharge: HOME | End: 2024-12-27
Attending: STUDENT IN AN ORGANIZED HEALTH CARE EDUCATION/TRAINING PROGRAM
Payer: COMMERCIAL

## 2024-12-27 DIAGNOSIS — O10.919 CHRONIC HYPERTENSION IN PREGNANCY: ICD-10-CM

## 2024-12-27 DIAGNOSIS — O09.529 ANTEPARTUM MULTIGRAVIDA OF ADVANCED MATERNAL AGE: ICD-10-CM

## 2024-12-27 PROCEDURE — 76816 OB US FOLLOW-UP PER FETUS: CPT

## 2024-12-30 ENCOUNTER — OFFICE VISIT (OUTPATIENT)
Dept: URGENT CARE | Facility: URGENT CARE | Age: 39
End: 2024-12-30
Payer: COMMERCIAL

## 2024-12-30 VITALS
HEART RATE: 103 BPM | SYSTOLIC BLOOD PRESSURE: 138 MMHG | TEMPERATURE: 97.6 F | OXYGEN SATURATION: 98 % | WEIGHT: 180 LBS | BODY MASS INDEX: 32.92 KG/M2 | RESPIRATION RATE: 20 BRPM | DIASTOLIC BLOOD PRESSURE: 89 MMHG

## 2024-12-30 DIAGNOSIS — R05.9 COUGH, UNSPECIFIED TYPE: ICD-10-CM

## 2024-12-30 DIAGNOSIS — R68.83 CHILLS: Primary | ICD-10-CM

## 2024-12-30 DIAGNOSIS — J10.1 INFLUENZA A: ICD-10-CM

## 2024-12-30 DIAGNOSIS — Z3A.28 28 WEEKS GESTATION OF PREGNANCY: ICD-10-CM

## 2024-12-30 DIAGNOSIS — H66.002 NON-RECURRENT ACUTE SUPPURATIVE OTITIS MEDIA OF LEFT EAR WITHOUT SPONTANEOUS RUPTURE OF TYMPANIC MEMBRANE: ICD-10-CM

## 2024-12-30 LAB
FLUAV AG SPEC QL IA: POSITIVE
FLUBV AG SPEC QL IA: NEGATIVE

## 2024-12-30 PROCEDURE — 99214 OFFICE O/P EST MOD 30 MIN: CPT | Performed by: PHYSICIAN ASSISTANT

## 2024-12-30 PROCEDURE — 87804 INFLUENZA ASSAY W/OPTIC: CPT | Performed by: PHYSICIAN ASSISTANT

## 2024-12-30 RX ORDER — OSELTAMIVIR PHOSPHATE 75 MG/1
75 CAPSULE ORAL 2 TIMES DAILY
Qty: 10 CAPSULE | Refills: 0 | Status: SHIPPED | OUTPATIENT
Start: 2024-12-30 | End: 2025-01-04

## 2024-12-30 RX ORDER — DEXTROMETHORPHAN POLISTIREX 30 MG/5ML
60 SUSPENSION ORAL 2 TIMES DAILY
Qty: 148 ML | Refills: 0 | Status: SHIPPED | OUTPATIENT
Start: 2024-12-30

## 2024-12-30 RX ORDER — ALBUTEROL SULFATE 90 UG/1
2 INHALANT RESPIRATORY (INHALATION) EVERY 6 HOURS
Qty: 8.5 G | Refills: 0 | Status: SHIPPED | OUTPATIENT
Start: 2024-12-30

## 2024-12-30 RX ORDER — AZITHROMYCIN 250 MG/1
TABLET, FILM COATED ORAL
Qty: 6 TABLET | Refills: 0 | Status: SHIPPED | OUTPATIENT
Start: 2024-12-30 | End: 2024-12-30

## 2024-12-30 RX ORDER — ACETAMINOPHEN 500 MG
500-1000 TABLET ORAL EVERY 6 HOURS PRN
Qty: 30 TABLET | Refills: 0 | Status: SHIPPED | OUTPATIENT
Start: 2024-12-30

## 2024-12-31 NOTE — PROGRESS NOTES
Assessment & Plan     Chills    Influenza neg  Tylenol for fever  - Influenza A & B Antigen - Clinic Collect  - acetaminophen (TYLENOL) 500 MG tablet  Dispense: 30 tablet; Refill: 0    Chest congestion    Concern for bacterial bronchitis  Bronchitis is inflammation of the bronchial tubes, which carry air to the lungs. The tubes swell and produce mucus, or phlegm. The mucus and inflamed bronchial tubes make you cough. You may have trouble breathing.  Most cases of bronchitis are caused by viruses but in your case we are more concerned about a bacterial infection. . Antibiotics usually are helpful in this situation to help you clear this bacterial infection.  Bronchitis usually develops rapidly and lasts about 2 to 3 weeks in otherwise healthy people.    Albuterol for mild wheezing      Cough, unspecified type    Delsym for coughing  - dextromethorphan (DELSYM) 30 MG/5ML liquid  Dispense: 148 mL; Refill: 0    28 weeks gestation of pregnancy    Continue follow up up with OB    Influenza A    Patient given information about influenza.  Patient understands they are contagious until their fever has resolved without the use of motrin or tylenol.  At that time they can return to school/work.  Patient is to monitor for any worsening symptoms and return to the clinic if this occurs.  The most common complication of influenza is Pneumonia or other respiratory problems especially in those with underlying lung problems including asthma and COPD.  Patient will follow up if this occurs.    Tamiflu for influenza      At today's visit with Shila Batres , we discussed results, diagnosis, medications and formulated a plan.  We also discussed red flags for immediate return to clinic/ER, as well as indications for follow up with PCP if not improved in 3 days. Patient understood and agreed to plan. Shila Batres was discharged with stable vitals and has no further questions.       No follow-ups on file.    Robles Rodriguez,  VERONIQUE CHIU  [facility] JONELLE Fuchs is a 39 year old female who presents to clinic today for the following health issues:  Chief Complaint   Patient presents with    Cough     Dry cough, wheezing, SOB, body ache and chills for the last few days. Patient is seven months pregnant        HPI    Review of Systems  Constitutional, HEENT, cardiovascular, pulmonary, gi and gu systems are negative, except as otherwise noted.      Objective    /89 (BP Location: Left arm, Patient Position: Sitting, Cuff Size: Adult Large)   Pulse 103   Temp 97.6  F (36.4  C) (Oral)   Resp 20   Wt 81.6 kg (180 lb)   LMP 06/10/2024   SpO2 98%   BMI 32.92 kg/m    Physical Exam   GENERAL: alert and no distress  EYES: Eyes grossly normal to inspection, PERRL and conjunctivae and sclerae normal  HENT: normal cephalic/atraumatic, right ear: normal: no effusions, no erythema, normal landmarks, left ear: erythematous, nose and mouth without ulcers or lesions, oropharynx clear, and oral mucous membranes moist  NECK: no adenopathy, no asymmetry, masses, or scars  RESP: expiratory wheezes throughout  CV: regular rate and rhythm, normal S1 S2, no S3 or S4, no murmur, click or rub, no peripheral edema  MS: no gross musculoskeletal defects noted, no edema  SKIN: no suspicious lesions or rashes  NEURO: Normal strength and tone, mentation intact and speech normal  PSYCH: mentation appears normal, affect normal/bright

## 2025-01-03 PROBLEM — O99.713 PRURITUS OF PREGNANCY IN THIRD TRIMESTER: Status: ACTIVE | Noted: 2025-01-03

## 2025-01-03 PROBLEM — L29.9 PRURITUS OF PREGNANCY IN THIRD TRIMESTER: Status: ACTIVE | Noted: 2025-01-03

## 2025-01-04 ENCOUNTER — HEALTH MAINTENANCE LETTER (OUTPATIENT)
Age: 40
End: 2025-01-04

## 2025-01-06 ENCOUNTER — HOSPITAL ENCOUNTER (OUTPATIENT)
Facility: CLINIC | Age: 40
Discharge: HOME | End: 2025-01-06
Attending: FAMILY MEDICINE | Admitting: FAMILY MEDICINE
Payer: COMMERCIAL

## 2025-01-06 ENCOUNTER — TELEPHONE (OUTPATIENT)
Dept: OBGYN | Facility: CLINIC | Age: 40
End: 2025-01-06

## 2025-01-06 ENCOUNTER — HOSPITAL ENCOUNTER (OUTPATIENT)
Facility: CLINIC | Age: 40
End: 2025-01-06
Admitting: FAMILY MEDICINE
Payer: COMMERCIAL

## 2025-01-06 ENCOUNTER — VIRTUAL VISIT (OUTPATIENT)
Dept: EDUCATION SERVICES | Facility: CLINIC | Age: 40
End: 2025-01-06
Attending: OBSTETRICS & GYNECOLOGY
Payer: COMMERCIAL

## 2025-01-06 VITALS — BODY MASS INDEX: 32.78 KG/M2 | RESPIRATION RATE: 18 BRPM | TEMPERATURE: 98 F | WEIGHT: 179.2 LBS

## 2025-01-06 DIAGNOSIS — O09.529 ANTEPARTUM MULTIGRAVIDA OF ADVANCED MATERNAL AGE: Primary | ICD-10-CM

## 2025-01-06 DIAGNOSIS — O20.8 SUBCHORIONIC HEMORRHAGE OF PLACENTA IN FIRST TRIMESTER: ICD-10-CM

## 2025-01-06 DIAGNOSIS — N83.209 OVARIAN CYST DURING PREGNANCY IN SECOND TRIMESTER: ICD-10-CM

## 2025-01-06 DIAGNOSIS — O24.410 WHITE CLASSIFICATION A1 GESTATIONAL DIABETES MELLITUS (GDM), DIET CONTROLLED: ICD-10-CM

## 2025-01-06 DIAGNOSIS — Z86.32 HISTORY OF GESTATIONAL DIABETES IN PRIOR PREGNANCY, CURRENTLY PREGNANT: ICD-10-CM

## 2025-01-06 DIAGNOSIS — O09.299 HISTORY OF GESTATIONAL DIABETES IN PRIOR PREGNANCY, CURRENTLY PREGNANT: ICD-10-CM

## 2025-01-06 DIAGNOSIS — O34.82 OVARIAN CYST DURING PREGNANCY IN SECOND TRIMESTER: ICD-10-CM

## 2025-01-06 DIAGNOSIS — O99.810 ABNORMAL MATERNAL GLUCOSE TOLERANCE, ANTEPARTUM: Primary | ICD-10-CM

## 2025-01-06 DIAGNOSIS — O16.3 HYPERTENSION AFFECTING PREGNANCY IN THIRD TRIMESTER: ICD-10-CM

## 2025-01-06 LAB
ALBUMIN MFR UR ELPH: 8.5 MG/DL
ALBUMIN SERPL BCG-MCNC: 3.2 G/DL (ref 3.5–5.2)
ALP SERPL-CCNC: 202 U/L (ref 40–150)
ALT SERPL W P-5'-P-CCNC: 104 U/L (ref 0–50)
ANION GAP SERPL CALCULATED.3IONS-SCNC: 14 MMOL/L (ref 7–15)
AST SERPL W P-5'-P-CCNC: 45 U/L (ref 0–45)
BILIRUB SERPL-MCNC: 0.2 MG/DL
BUN SERPL-MCNC: 5.3 MG/DL (ref 6–20)
CALCIUM SERPL-MCNC: 8.7 MG/DL (ref 8.8–10.4)
CHLORIDE SERPL-SCNC: 106 MMOL/L (ref 98–107)
CREAT SERPL-MCNC: 0.68 MG/DL (ref 0.51–0.95)
CREAT UR-MCNC: 100.8 MG/DL
EGFRCR SERPLBLD CKD-EPI 2021: >90 ML/MIN/1.73M2
ERYTHROCYTE [DISTWIDTH] IN BLOOD BY AUTOMATED COUNT: 15.2 % (ref 10–15)
GLUCOSE SERPL-MCNC: 99 MG/DL (ref 70–99)
HCO3 SERPL-SCNC: 17 MMOL/L (ref 22–29)
HCT VFR BLD AUTO: 34.8 % (ref 35–47)
HGB BLD-MCNC: 11.2 G/DL (ref 11.7–15.7)
MCH RBC QN AUTO: 27 PG (ref 26.5–33)
MCHC RBC AUTO-ENTMCNC: 32.2 G/DL (ref 31.5–36.5)
MCV RBC AUTO: 84 FL (ref 78–100)
PLATELET # BLD AUTO: 448 10E3/UL (ref 150–450)
POTASSIUM SERPL-SCNC: 4.3 MMOL/L (ref 3.4–5.3)
PROT SERPL-MCNC: 6.3 G/DL (ref 6.4–8.3)
PROT/CREAT 24H UR: 0.08 MG/MG CR (ref 0–0.2)
RBC # BLD AUTO: 4.15 10E6/UL (ref 3.8–5.2)
SODIUM SERPL-SCNC: 137 MMOL/L (ref 135–145)
WBC # BLD AUTO: 9.6 10E3/UL (ref 4–11)

## 2025-01-06 PROCEDURE — 84156 ASSAY OF PROTEIN URINE: CPT | Performed by: FAMILY MEDICINE

## 2025-01-06 PROCEDURE — G0463 HOSPITAL OUTPT CLINIC VISIT: HCPCS

## 2025-01-06 PROCEDURE — 99213 OFFICE O/P EST LOW 20 MIN: CPT | Performed by: FAMILY MEDICINE

## 2025-01-06 PROCEDURE — 36415 COLL VENOUS BLD VENIPUNCTURE: CPT | Performed by: FAMILY MEDICINE

## 2025-01-06 PROCEDURE — 85014 HEMATOCRIT: CPT | Performed by: FAMILY MEDICINE

## 2025-01-06 PROCEDURE — 82310 ASSAY OF CALCIUM: CPT | Performed by: FAMILY MEDICINE

## 2025-01-06 PROCEDURE — 82040 ASSAY OF SERUM ALBUMIN: CPT | Performed by: FAMILY MEDICINE

## 2025-01-06 PROCEDURE — G0109 DIAB MANAGE TRN IND/GROUP: HCPCS | Mod: 95

## 2025-01-06 RX ORDER — LANCETS
EACH MISCELLANEOUS
Qty: 200 EACH | Refills: 1 | Status: SHIPPED | OUTPATIENT
Start: 2025-01-06

## 2025-01-06 ASSESSMENT — ACTIVITIES OF DAILY LIVING (ADL)
ADLS_ACUITY_SCORE: 15
ADLS_ACUITY_SCORE: 41
ADLS_ACUITY_SCORE: 15

## 2025-01-06 NOTE — LETTER
1/6/2025         RE: Shila AMES Medardo  [address]  [place] [place] [facility] [address]        Dear Colleague,    Thank you for referring your patient, Shila Batres, to [facility]. Please see a copy of my visit note below.    No notes on file

## 2025-01-06 NOTE — DISCHARGE INSTRUCTIONS
Learning About When to Call Your Doctor During Pregnancy (After 20 Weeks)  Overview  It's common to have concerns about what might be a problem when you're pregnant. Most pregnancies don't have any serious problems. But it's still important to know when to call your doctor if you have certain symptoms or signs of labor.  These are general suggestions. Your doctor may give you some more information about when to call.  When to call your doctor (after 20 weeks)  Call 911  anytime you think you may need emergency care. For example, call if:  You have severe vaginal bleeding. This means you are soaking through a pad each hour for 2 or more hours.  You have sudden, severe pain in your belly.  You have chest pain, are short of breath, or cough up blood.  You passed out (lost consciousness).  You have a seizure.  You see or feel the umbilical cord.  You think you are about to deliver your baby and can't make it safely to the hospital or birthing center.  Call your doctor now or seek immediate medical care if:  You have vaginal bleeding.  You have belly pain.  You have a fever.  You are dizzy or lightheaded, or you feel like you may faint.  You have signs of a blood clot in your leg (called a deep vein thrombosis), such as:  Pain in the calf, back of the knee, thigh, or groin.  Swelling in your leg or groin.  A color change on the leg or groin. The skin may be reddish or purplish, depending on your usual skin color.  You have symptoms of preeclampsia, such as:  Sudden swelling of your face, hands, or feet.  New vision problems (such as dimness, blurring, or seeing spots).  A severe headache.  You have a sudden release of fluid from your vagina. (You think your water broke.)  You've been having regular contractions for an hour. This means that you've had at least 6 contractions within 1 hour, even after you change your position and drink fluids.  You notice that your baby has stopped moving or is moving less than  "normal.  You have signs of heart failure, such as:  New or increased shortness of breath.  New or worse swelling in your legs, ankles, or feet.  Sudden weight gain, such as more than 2 to 3 pounds in a day or 5 pounds in a week.  Feeling so tired or weak that you cannot do your usual activities.  You have symptoms of a urinary tract infection. These may include:  Pain or burning when you urinate.  A frequent need to urinate without being able to pass much urine.  Pain in the flank, which is just below the rib cage and above the waist on either side of the back.  Blood in your urine.  Watch closely for changes in your health, and be sure to contact your doctor if:  You have vaginal discharge that smells bad.  You feel sad, anxious, or hopeless for more than a few days.  You have skin changes, such as a rash, itching, or a yellow color to your skin.  You have other concerns about your pregnancy.  If you have labor signs at 37 weeks or more  If you have signs of labor at 37 weeks or more, your doctor may tell you to call when your labor becomes more active. Symptoms of active labor include:  Contractions that are regular.  Contractions that are less than 5 minutes apart.  Contractions that are hard to talk through.  Follow-up care is a key part of your treatment and safety. Be sure to make and go to all appointments, and call your doctor if you are having problems. It's also a good idea to know your test results and keep a list of the medicines you take.  Where can you learn more?  Go to https://www.[employer].net/patiented  Enter N531 in the search box to learn more about \"Learning About When to Call Your Doctor During Pregnancy (After 20 Weeks).\"  Current as of: April 30, 2024  Content Version: 14.3    2024 [facility] [employer].   Care instructions adapted under license by your healthcare professional. If you have questions about a medical condition or this instruction, always ask your healthcare professional. " [employer], [facility] disclaims any warranty or liability for your use of this information.

## 2025-01-06 NOTE — TELEPHONE ENCOUNTER
I left a voicemail for Pt: her labs from 1/3/2025 are abnl and she needs evaluation in L&D.    Pt to return call to clinic to get instructions.

## 2025-01-06 NOTE — GROUP NOTE
Diabetes Diabetes Self-Management Education & Support  1/6/2025  [facility]  Start and End Time  Start Time: 1302  End Time: 1417    Subjective/Objective  Shila is an 39 year old year old, presenting for the following diabetes education related to: gestational diabetes  How confident are you filling out medical forms by yourself:: (Patient-Rptd) Extremely    Cultural Influences/Ethnic Background:   or     Estimated Date of Delivery: Mar 17, 2025    1 hour OGTT  Lab Results   Component Value Date    GLU1 230 (H) 12/20/2024       3 hour OGTT    Fasting  Lab Results   Component Value Date    GLF 86 01/13/2010       1 hour  Lab Results   Component Value Date    GL1 180 (H) 01/13/2010       2 hour  Lab Results   Component Value Date    GL2 149 01/13/2010       3 hour  Lab Results   Component Value Date    GL3 140 (H) 01/13/2010       INTERVENTION:    Educational topics covered today:  GDM diagnosis, pathophysiology, risks and complications of GDM, means of controlling GDM, using a blood glucose monitor, blood glucose goals, logging and interpreting glucose results, ketone testing, when to call a diabetes educator or OB provider, healthy eating during pregnancy, counting carbohydrates, meal planning for GDM, and physical activity    Educational materials emailed today:   [place] Understanding Gestational Diabetes  GDM Log Book  [employer]ar[employer] Disposal  Care After Delivery  GDM Food Log  HS Snack List    Plan  Test glucose 4 times per day:   Fasting (when you first awake for the day): 95 mg/dL or below   1 hour after breakfast: 140 mg/dL or below   1 hour after lunch: 140 mg/dL or below   1 hour after dinner: 140 mg/dL or below     Please bring your meter and log book to all appointments     If you miss 1 hour after meal test, test 2 hours after the meal.  Goal 2 hours after is 120 mg/dL or below.     2.  Check your urine ketones once a day, when you first awake for the day until they are  negative to trace for 7 days in a row.  Then decrease and check once a week.     3.  Meal Plan    Breakfast: 30 grams carbohydrate + protein   Snack: 15-30 grams carbohydrate + protein  Lunch: 45-60 grams carbohydrate + protein  Snack: 15-30 grams carbohydrate + protein  Dinner: 45-60 grams carbohydrate + protein  Snack: 15-30 grams carbohydrate + protein    A few tips:   -consume some carbohydrate every 2-3 hours while awake   -you need a minimum of 175 grams of carbohydrate per day   -fruit and cold breakfast cereal are best tolerated at lunch or later   -protein includes: cheese, eggs, fish, nuts, nut butter, chicken, turkey, beef, and pork   -snack ideas: an individual container of Greek yogurt (try Chobani Less Sugar), whole grain crackers and cheese, chocolate fairlife milk, a Kashi or KIND bar, a baseball size piece of whole fruit + nut butter (apple + peanut butter), fruit canned in it's own juice + cottage cheese    4.  Aim for 20-30 minutes of activity most days of the week (with the okay of your OB provider).      5. Call Diabetes Education at [phone] or send a [employer] message with:   -questions or concerns   -ketones that are small, moderate, or large   -3 or more blood sugars above target in a 7 day period    Lottie Bui, MPH, RD, CDCES, LD 1/6/2025

## 2025-01-06 NOTE — PLAN OF CARE
Data: Patient presented to [facility]: 2025  2:52 PM.  Reason for maternal/fetal assessment is elevated blood pressures. Patient reports good fetal movement and no contractions.  Patient is a .  Prenatal record reviewed. Pregnancy  has been complicated by gestational diabetes, diet controlled.  Gestational Age 30w0d. VSS. Fetal movement active. Patient denies uterine contractions, leaking of vaginal fluid/rupture of membranes, vaginal bleeding, abdominal pain, pelvic pressure, nausea, vomiting, headache, visual disturbances, epigastric or URQ pain, significant edema. Support person is not present.   Action: Verbal consent for EFM. Triage assessment completed. Bill of rights reviewed.  Response: Patient verbalized agreement with plan. Will contact Dr Yelena Montilla with update and for further orders.

## 2025-01-06 NOTE — PROGRESS NOTES
Data: Patient assessed in the [facility] for hypertension disorder of pregnancy.  Cervical exam not examined.  Membranes intact.    Action:  Presumed adequate fetal oxygenation documented (see flow record). Discharge instructions reviewed.  Patient instructed to report change in fetal movement, vaginal leaking of fluid or bleeding, abdominal pain, or any concerns related to the pregnancy to her nurse/physician.    Response: Orders to discharge home per Yelena Montilla.  Patient verbalized understanding of education and verbalized agreement with plan. Discharged to home at 1700.    AVS read to patient and all questions and concerns addressed. Patient to follow up with clinic this week.

## 2025-01-06 NOTE — PATIENT INSTRUCTIONS
Test glucose 4 times per day:              Fasting (when you first awake for the day): 95 mg/dL or below              1 hour after breakfast: 140 mg/dL or below              1 hour after lunch: 140 mg/dL or below              1 hour after dinner: 140 mg/dL or below                 Please bring your meter and log book to all appointments                 If you miss 1 hour after meal test, test 2 hours after the meal.  Goal 2 hours after is 120 mg/dL or below.      2.  Check your urine ketones once a day, when you first awake for the day until they are negative to trace for 7 days in a row.  Then decrease and check once a week.      3.  Meal Plan     Breakfast: 30 grams carbohydrate + protein   Snack: 15-30 grams carbohydrate + protein  Lunch: 45-60 grams carbohydrate + protein  Snack: 15-30 grams carbohydrate + protein  Dinner: 45-60 grams carbohydrate + protein  Snack: 15-30 grams carbohydrate + protein     A few tips:              -consume some carbohydrate every 2-3 hours while awake              -you need a minimum of 175 grams of carbohydrate per day              -fruit and cold breakfast cereal are best tolerated at lunch or later              -protein includes: cheese, eggs, fish, nuts, nut butter, chicken, turkey, beef, and pork              -snack ideas: an individual container of Greek yogurt (try Chobani Less Sugar), whole grain crackers and cheese, chocolate fairlife milk, a Kashi or KIND bar, a baseball size piece of whole fruit + nut butter (apple + peanut butter), fruit canned in it's own juice + cottage cheese     4.  Aim for 20-30 minutes of activity most days of the week (with the okay of your OB provider).       5. Call Diabetes Education at [phone] or send a [employer] message with:              -questions or concerns              -ketones that are small, moderate, or large              -3 or more blood sugars above target in a 7 day period

## 2025-01-16 ENCOUNTER — TELEPHONE (OUTPATIENT)
Dept: OBGYN | Facility: CLINIC | Age: 40
End: 2025-01-16
Payer: COMMERCIAL

## 2025-01-16 DIAGNOSIS — O16.2 HYPERTENSION AFFECTING PREGNANCY IN SECOND TRIMESTER: ICD-10-CM

## 2025-01-16 RX ORDER — ASPIRIN 81 MG/1
81 TABLET ORAL DAILY
Qty: 90 TABLET | Refills: 3 | Status: SHIPPED | OUTPATIENT
Start: 2025-01-16

## 2025-01-16 NOTE — TELEPHONE ENCOUNTER
[facility] Call Center    Phone Message    May a detailed message be left on voicemail: yes     Reason for Call: Medication Refill Request    Has the patient contacted the pharmacy for the refill? Yes   Name of medication being requested: Aspirin 81 MG Oral Tablet   Provider who prescribed the medication: Dr. Yfn Godoy  Pharmacy:   [id number] [place], [place] - [address] AT [facility] [address]     Date medication is needed: Today, patient no longer has medication at home.    Action Taken: Message routed to:  Other: [facility]    Travel Screening: Not Applicable     Date of Service:

## 2025-01-20 ENCOUNTER — ALLIED HEALTH/NURSE VISIT (OUTPATIENT)
Dept: EDUCATION SERVICES | Facility: CLINIC | Age: 40
End: 2025-01-20
Payer: COMMERCIAL

## 2025-01-20 DIAGNOSIS — O24.410 WHITE CLASSIFICATION A1 GESTATIONAL DIABETES MELLITUS (GDM), DIET CONTROLLED: ICD-10-CM

## 2025-01-20 DIAGNOSIS — O99.810 ABNORMAL MATERNAL GLUCOSE TOLERANCE, ANTEPARTUM: Primary | ICD-10-CM

## 2025-01-20 NOTE — PATIENT INSTRUCTIONS
1. Check blood sugar 4 times a day, before breakfast and 1 hour after the start of each meal.     2. Check urine ketones when you wake up every morning for 7 days. If negative everyday, reduce testing to once a week.    3. Follow the recommended meal plan: eat something every 2-3 hours, include protein/fat and carbohydrate at every meal and snack.  Breakfast: 30 grams carbohydrate + protein   Snack: 15-30 grams carbohydrate + protein  Lunch: 45-60 grams carbohydrate + protein  Snack: 15-30 grams carbohydrate + protein  Dinner: 45-60 grams carbohydrate + protein  Snack: 15-30 grams carbohydrate + protein    4. Add activity to every day, try walking or being active after each meal to help control blood sugar levels.    Follow up with diabetes education for blood glucose and ketone review on Jan 27 via playnik.    1. Check glucose 4 times daily, before breakfast daily and 1 hour after each meal, as recommended.    2. Continue with recommended physical activity.    3. Follow-up with OB doctor as recommended.    4. Call or MyChart message your diabetes educator if 3 or more blood sugars are above the goal in 1 week or if ketones are elevated (trace or above).       AFTER YOU DELIVER:  - Continue with healthy eating and physical activity to get back to your pre-pregnancy weight.   - Have a follow-up 2-hour Glucose Tolerance Test at your 6-week post-partum check-up.   - Have your fasting blood sugar checked once a year.  - Plan ahead for future pregnancies - eat healthy, keep active, work with your doctor to check for gestational diabetes early on in the pregnancy and check blood sugars as recommended by your doctor.

## 2025-01-20 NOTE — LETTER
1/20/2025         RE: Shila Batres  [address] [facility] [id number]        Dear Colleague,    Thank you for referring your patient, Shila Batres, to the [facility] [place]. Please see a copy of my visit note below.      Diabetes Self-Management Education & Support    Type of Service: In Person Visit      Assessment  Has not started checking her ketones or BGs yet. She brought in her meter and was wondering how to use it. Showed her how to check her BG. Her glucose in our visit was 153 mg/dL (after eating some sweet bread and milk). She had her initial GDM visit earlier this month but reports she had to go to the hospital that day so does not remember much of what was covered. Reviewed the meal plan with her today as well as instructed her on how to check for ketones and our goals/frequency of checking ketones. She often misses breakfast so emphasized importance of trying to eat some breakfast. Reminded her to avoid cereal, fruit, and juice at breakfast and discussed some ideas she could try.    She is a bit nervous for insulin so showed her  briefly what an injection looks like.  Showed her how to put a needle on, do the 2 unit prime, and how/where to inject. Explained that we can give her more details on this  if she needs insulin.     Intervention  Educational topics covered today:  Meal plan for GDM, snack ideas, how to check her glucose with her BGM, glucose goals and when to check, ketone testing, Did also show her briefly how to do an insulin injection if it is needed since she was nervous for this if it is recommended, What to expect after delivery, future testing for Type 2 diabetes (2 hour OGTT at 6 week post-partum check-up and annual fasting blood glucose level), risk of GDM and planning ahead for future pregnancies, recommended lifestyle interventions for reducing the risk of Type 2 Diabetes, when to call a Diabetes Educator or OB provider    Educational Materials  provided today: Gave her printed copies of each and sent her an email with all of these as well.  Patrick Preventing Diabetes  Patrick Understanding Gestational Diabetes  GDM Log Book  Care After Delivery  GDM Food Log  HS Snack List    Patient verbalized understanding of diabetes self-management education concepts discussed, opportunities for ongoing education and support, and recommendations provided today    Plan  Check glucose four times daily, before breakfast and 1 hour after each meal.  Check ketones once a week when readings are consistently negative.  Continue with recommended physical activity.  Continue to follow recommended meal plan: 30-45g carbohydratess at breakfast, 45-60g carbohydratess at lunch, 45-60g carbohydrates at supper, 15-30g carbohydrates at snacks.  Follow consistent carbohydrate meal plan, eat carbohydrates and protein/fat at all meals/snacks.    Send in Glucose Log (blood sugars) via leemail in 7 days. If 3 or more blood sugars are above the goal at a given time, or if Ketones are small, moderate or large, call or leemail message the diabetes educator.    Follow-up:    Follow up in 1 week via [email or web address].       Subjective/Objective  Shila is an 39 year old year old, presenting for the following diabetes education related to:      Accompanied by: Self  Diabetes management related comments/concerns: Brought her BG meter with her today and is wondering how to use it. Asking about what to eat.  Gestational weeks: 32w0d  Number of previous pregnancies: 3  Had any babies over 9 lbs: No  Previously had Gestational Diabetes: Yes  Had Diabetes Education before: No  Previous insulin or other diabetes medication during that pregnancy: No  Have you ever had thyroid problems or taken thyroid medication?: No  Heart disease, mitral valve prolapse or rheumatic fever?: No  Hypertension : (!) Yes  High Cholesterol: No  High Triglycerides: No  Do you use tobacco products?: No  Do you drink beer, wine or  hard liquor?: No    Cultural Influences/Ethnic Background:   or     LMP 06/10/2024     Weight gain 21 lbs at 32 weeks gestation.      Estimated Date of Delivery: Mar 17, 2025      Healthy Eating:  Pre-pregnancy weight (lbs): 160  Barrier to exercise: None  Cultural/Bahai diet restrictions?: No  Meal planning/habits: None  How many times a week on average do you eat food made away from home (restaurant/take-out)?: 3  Meals include: Breakfast  Beverages: Water, Coffee, Juice, Diet soda  How many servings of fruits/vegetables per day: 1  Biggest challenges to healthy eating: Portion control, Eating out  Pre- vitamin?: Yes  Supplements?: No  Experiencing nausea?: No  Experiencing heartburn?: Yes    Healthy Coping:  Informal Support system:: Family  Stage of change: PREPARATION (Decided to change - considering how)    Current Management:  Taking medications for gestational diabetes?: No  Difficulty affording diabetes testing supplies?: No    Penny Harris RD  Time Spent: 50 minutes  Encounter Type: Individual     Diabetes medication dose changes were made via the CDCES Standing Orders under the patient's referring provider.

## 2025-01-20 NOTE — PROGRESS NOTES
Diabetes Self-Management Education & Support    Type of Service: In Person Visit      Assessment  Has not started checking her ketones or BGs yet. She brought in her meter and was wondering how to use it. Showed her how to check her BG. Her glucose in our visit was 153 mg/dL (after eating some sweet bread and milk). She had her initial GDM visit earlier this month but reports she had to go to the hospital that day so does not remember much of what was covered. Reviewed the meal plan with her today as well as instructed her on how to check for ketones and our goals/frequency of checking ketones. She often misses breakfast so emphasized importance of trying to eat some breakfast. Reminded her to avoid cereal, fruit, and juice at breakfast and discussed some ideas she could try.    She is a bit nervous for insulin so showed her  briefly what an injection looks like.  Showed her how to put a needle on, do the 2 unit prime, and how/where to inject. Explained that we can give her more details on this  if she needs insulin.     Intervention  Educational topics covered today:  Meal plan for GDM, snack ideas, how to check her glucose with her BGM, glucose goals and when to check, ketone testing, Did also show her briefly how to do an insulin injection if it is needed since she was nervous for this if it is recommended, What to expect after delivery, future testing for Type 2 diabetes (2 hour OGTT at 6 week post-partum check-up and annual fasting blood glucose level), risk of GDM and planning ahead for future pregnancies, recommended lifestyle interventions for reducing the risk of Type 2 Diabetes, when to call a Diabetes Educator or OB provider    Educational Materials provided today: Gave her printed copies of each and sent her an email with all of these as well.  Patrick Preventing Diabetes  Patrick Understanding Gestational Diabetes  GDM Log Book  Care After Delivery  GDM Food Log  HS Snack List    Patient verbalized  understanding of diabetes self-management education concepts discussed, opportunities for ongoing education and support, and recommendations provided today    Plan  Check glucose four times daily, before breakfast and 1 hour after each meal.  Check ketones once a week when readings are consistently negative.  Continue with recommended physical activity.  Continue to follow recommended meal plan: 30-45g carbohydratess at breakfast, 45-60g carbohydratess at lunch, 45-60g carbohydrates at supper, 15-30g carbohydrates at snacks.  Follow consistent carbohydrate meal plan, eat carbohydrates and protein/fat at all meals/snacks.    Send in Glucose Log (blood sugars) via [employer] in 7 days. If 3 or more blood sugars are above the goal at a given time, or if Ketones are small, moderate or large, call or [employer] message the diabetes educator.    Follow-up:    Follow up in 1 week via [employer].       Subjective/Objective  Shila is an 39 year old year old, presenting for the following diabetes education related to:      Accompanied by: Self  Diabetes management related comments/concerns: Brought her BG meter with her today and is wondering how to use it. Asking about what to eat.  Gestational weeks: 32w0d  Number of previous pregnancies: 3  Had any babies over 9 lbs: No  Previously had Gestational Diabetes: Yes  Had Diabetes Education before: No  Previous insulin or other diabetes medication during that pregnancy: No  Have you ever had thyroid problems or taken thyroid medication?: No  Heart disease, mitral valve prolapse or rheumatic fever?: No  Hypertension : (!) Yes  High Cholesterol: No  High Triglycerides: No  Do you use tobacco products?: No  Do you drink beer, wine or hard liquor?: No    Cultural Influences/Ethnic Background:   or     LMP 06/10/2024     Weight gain 21 lbs at 32 weeks gestation.      Estimated Date of Delivery: Mar 17, 2025      Healthy Eating:  Pre-pregnancy weight (lbs): 160  Barrier to  exercise: None  Cultural/Taoism diet restrictions?: No  Meal planning/habits: None  How many times a week on average do you eat food made away from home (restaurant/take-out)?: 3  Meals include: Breakfast  Beverages: Water, Coffee, Juice, Diet soda  How many servings of fruits/vegetables per day: 1  Biggest challenges to healthy eating: Portion control, Eating out  Pre- vitamin?: Yes  Supplements?: No  Experiencing nausea?: No  Experiencing heartburn?: Yes    Healthy Coping:  Informal Support system:: Family  Stage of change: PREPARATION (Decided to change - considering how)    Current Management:  Taking medications for gestational diabetes?: No  Difficulty affording diabetes testing supplies?: No    Penny Harris RD  Time Spent: 50 minutes  Encounter Type: Individual     Diabetes medication dose changes were made via the CDCES Standing Orders under the patient's referring provider.

## 2025-01-21 DIAGNOSIS — R05.9 COUGH, UNSPECIFIED TYPE: ICD-10-CM

## 2025-01-21 RX ORDER — ALBUTEROL SULFATE 90 UG/1
2 AEROSOL, METERED RESPIRATORY (INHALATION) EVERY 6 HOURS
Qty: 18 G | Refills: 0 | Status: SHIPPED | OUTPATIENT
Start: 2025-01-21

## 2025-01-24 ENCOUNTER — HOSPITAL ENCOUNTER (OUTPATIENT)
Dept: ULTRASOUND IMAGING | Facility: CLINIC | Age: 40
Discharge: HOME | End: 2025-01-24
Attending: OBSTETRICS & GYNECOLOGY | Admitting: OBSTETRICS & GYNECOLOGY
Payer: COMMERCIAL

## 2025-01-24 DIAGNOSIS — O09.529 ANTEPARTUM MULTIGRAVIDA OF ADVANCED MATERNAL AGE: ICD-10-CM

## 2025-01-24 DIAGNOSIS — O10.919 CHRONIC HYPERTENSION IN PREGNANCY: ICD-10-CM

## 2025-01-24 PROCEDURE — 76819 FETAL BIOPHYS PROFIL W/O NST: CPT

## 2025-01-24 PROCEDURE — 76816 OB US FOLLOW-UP PER FETUS: CPT

## 2025-01-29 ENCOUNTER — MYC MEDICAL ADVICE (OUTPATIENT)
Dept: OBGYN | Facility: CLINIC | Age: 40
End: 2025-01-29
Payer: COMMERCIAL

## 2025-01-29 NOTE — TELEPHONE ENCOUNTER
Gestational Diabetes Follow-up    Subjective/Objective:    Shila Batres sent in blood glucose log for review. Last date of communication was: 1/20.    Gestational diabetes is being managed with diet and activity    Taking diabetes medications: no      Estimated Date of Delivery: Mar 17, 2025    BG/Food Log:       Assessment: Pt's fasting BG elevated the majority of the time but at goal this morning. Will monitor for a few more days and rec starting insulin if BG do not improve. Also large ketones this morning, will ask further questions to pt, question if it is due to not eating enough carbohydrates.    Ketones: negative/trace but large this morning.  Fasting blood glucoses: 20% in target.  After breakfast: 75% in target.  Before lunch: -% in target.  After lunch: 50% in target-2 of 4 days  Before dinner: -% in target.  After dinner: 100% in target-only 1 day of data    Plan/Response:  Follow-up in 2 days if fasting still elevated and/or ketones small or larger. Otherwise follow-up next week.     Odette Hunt RD, BRENT, [facility]      Any diabetes medication dose changes were made via the CDE Protocol and Collaborative Practice Agreement with the patient's OB/GYN provider. A copy of this encounter was shared with the provider.

## 2025-01-29 NOTE — TELEPHONE ENCOUNTER
Please see [employer] message:     Pt sent picture of blood sugars.     Please advise.     GONZÁLEZ Posey

## 2025-01-29 NOTE — TELEPHONE ENCOUNTER
It looks like some of her fasting glucoses are a bit higher than ideal, as well as some of her after lunch levels. I'll forward these messages to DM Ed review these and her ketones in her urine to see if she warrants some insulin.

## 2025-01-31 ENCOUNTER — HOSPITAL ENCOUNTER (OUTPATIENT)
Dept: ULTRASOUND IMAGING | Facility: CLINIC | Age: 40
Discharge: HOME | End: 2025-01-31
Attending: OBSTETRICS & GYNECOLOGY
Payer: COMMERCIAL

## 2025-01-31 DIAGNOSIS — O10.919 CHRONIC HYPERTENSION AFFECTING PREGNANCY: ICD-10-CM

## 2025-01-31 PROBLEM — O20.8 SUBCHORIONIC HEMORRHAGE OF PLACENTA IN FIRST TRIMESTER: Status: RESOLVED | Noted: 2024-09-06 | Resolved: 2025-01-31

## 2025-01-31 PROBLEM — O09.529 ANTEPARTUM MULTIGRAVIDA OF ADVANCED MATERNAL AGE: Status: RESOLVED | Noted: 2025-01-06 | Resolved: 2025-01-31

## 2025-01-31 PROCEDURE — 76819 FETAL BIOPHYS PROFIL W/O NST: CPT

## 2025-02-03 ENCOUNTER — TELEPHONE (OUTPATIENT)
Dept: OBGYN | Facility: CLINIC | Age: 40
End: 2025-02-03
Payer: COMMERCIAL

## 2025-02-03 DIAGNOSIS — O09.299 HISTORY OF GESTATIONAL DIABETES IN PRIOR PREGNANCY, CURRENTLY PREGNANT: ICD-10-CM

## 2025-02-03 DIAGNOSIS — O24.410 WHITE CLASSIFICATION A1 GESTATIONAL DIABETES MELLITUS (GDM), DIET CONTROLLED: Primary | ICD-10-CM

## 2025-02-03 DIAGNOSIS — Z86.32 HISTORY OF GESTATIONAL DIABETES IN PRIOR PREGNANCY, CURRENTLY PREGNANT: ICD-10-CM

## 2025-02-03 NOTE — TELEPHONE ENCOUNTER
Left message to call back on voicemail on cell phone.  Diab ed has been trying to reach the pt to schedule her for appts.   Per Dr. oGdoy:  Yfn Godoy, MD  [facility] Triage  Please contact Pt and advise her that DM Ed has not been able to reach her. She should call them back ASAP.    She likely needs insulin started based on BS's that I saw in clinic.    Dr. Walt Helm, RN

## 2025-02-03 NOTE — TELEPHONE ENCOUNTER
Spoke with the pt.   Gave her the number to the diab educators.   She will call them now to make an appt.    Aissatou CAMARILLO RN  [facility]

## 2025-02-06 ENCOUNTER — ALLIED HEALTH/NURSE VISIT (OUTPATIENT)
Dept: EDUCATION SERVICES | Facility: CLINIC | Age: 40
End: 2025-02-06
Payer: COMMERCIAL

## 2025-02-06 DIAGNOSIS — O24.410 WHITE CLASSIFICATION A1 GESTATIONAL DIABETES MELLITUS (GDM), DIET CONTROLLED: ICD-10-CM

## 2025-02-06 NOTE — PROGRESS NOTES
Diabetes Self-Management Education & Support    Type of Service: In Person Visit      Assessment  Ketones: neg-large.   Fasting blood glucoses: 33% in target.  After breakfast: 87% in target.  After lunch: 83% in target. (Not using the first day since this is when I taught her how to check her BG and she was not following the meal plan)  After dinner: 100% in target.    Has changed her eating since our last visit. She noticed that juice raised her glucose the first day of testing so she stopped straight juice and pop after that day. Since then, her post meal numbers have been in target.    Couple days last week with no bedtime snack last week which is likely why she had large ketones and also higher FBS results.  The past couple nights she has had a bedtime snack (cereal) and her FBS has been in target and her ketones negative. Emphasized with her today the importance of checking her BG 4x/d and not missing days and also of the importance of the bedtime snack. Educated on having 30 grams of carb and what this looks like with some ideas AND having protein with this which she has not been having. Gave her a list in her AVS of HS snack ideas for both carbs and protein.     Since her last couple FBS were in target with a snack, will give her this weekend to see if they stay in target. If they are high, told her that we will need to start insulin.  Since she has not been following up with us as requested via [employer], I scheduled her with us as a phone appointment on Monday to review her BGs and order insulin if needed.      Intervention  Educational topics covered today:  Insulin injection technique taught using a Vial and Syringe and insulin pen in case she needs insulin which I suspect she will. Patient verbalized understanding and was able to perform an accurate return demonstration of injection technique.  Discussed mixing insulin, storage, sharps, new needle each use, site selection, action of insulin and  hypoglycemia signs, symptoms and treatment.        Educational Materials provided today:  Measuring and Injecting Insulin    Patient verbalized understanding of diabetes self-management education concepts discussed, opportunities for ongoing education and support, and recommendations provided today    Plan  She is going to work on having a bedtime snack and ensuring it is 30 grams of carb AND has protein. Gave her a list of options to choose from.   Follow up scheduled via phone on Monday 2/10 to review blood sugars and order insulin if needed.   Recommend we continue to follow Shila by phone as she has not been updating us via [employer] with her numbers.   Emphasized importance of checking BG 4x/day and not missing day.   Check glucose four times daily, before breakfast and 1 hour after each meal.  Check ketones once a week when readings are consistently negative.  Continue with recommended physical activity.  Continue to follow recommended meal plan: 30-45g carbohydratess at breakfast, 45-60g carbohydratess at lunch, 45-60g carbohydrates at supper, 15-30g carbohydrates at snacks.  Follow consistent carbohydrate meal plan, eat carbohydrates and protein/fat at all meals/snacks.    Send in Glucose Log (blood sugars) via [employer] or call in 3-4 days. If 3 or more blood sugars are above the goal at a given time, or if Ketones are small, moderate or large, call or [employer] message the diabetes educator.    Follow-up:    Follow up on Upcoming Diabetes Ed Appointments     Visit Type Date Time Department    GDM ED SHORT 2/10/2025  9:30 AM  [facility] ED        Subjective/Objective  Shila is an 39 year old year old, presenting for the following diabetes education related to:      Accompanied by: Self  Diabetes management related comments/concerns: Here to learn insulin  Gestational weeks: 34w3d  [facility]ospital planned for [facility]  Number of previous pregnancies: 3  Had any babies over 9 lbs: No  Previously had  Gestational Diabetes: Yes  Had Diabetes Education before: No  Previous insulin or other diabetes medication during that pregnancy: No  Have you ever had thyroid problems or taken thyroid medication?: No  Heart disease, mitral valve prolapse or rheumatic fever?: No  Hypertension : (!) Yes  High Cholesterol: No  High Triglycerides: No  Do you use tobacco products?: No  Do you drink beer, wine or hard liquor?: No    Cultural Influences/Ethnic Background:   or     LMP 06/10/2024       Estimated Date of Delivery: Mar 17, 2025    Blood Glucose/Ketone Log:         Healthy Eating:  Pre-pregnancy weight (lbs): 160  Barrier to exercise: None  Cultural/Roman Catholic diet restrictions?: No  Meal planning/habits: None  How many times a week on average do you eat food made away from home (restaurant/take-out)?: 3  Meals include: Breakfast  Breakfast: bread x 1, 3 boiled eggs, salad (with chick filet sauce on it and lemon)  Lunch: [place] with vegetables (green cabbage) and claire juice mixed with water  Dinner: meat with rice (1 fist) and vegetables and water  Snacks: AM snack: bar or fruit (1 cutie and 1 apple); PM snack: granola bar or vegetables (broccoli/carrots); HS snack: cereal sometimes (last night) or does not have  Beverages: Water, Coffee, Juice, Diet soda  How many servings of fruits/vegetables per day: 1  Biggest challenges to healthy eating: Portion control, Eating out  Pre- vitamin?: Yes  Supplements?: No  Experiencing nausea?: No  Experiencing heartburn?: Yes    Healthy Coping:  Emotional response to diabetes: Ready to learn  Informal Support system:: Family  Stage of change: ACTION (Actively working towards change)    Current Management:  Taking medications for gestational diabetes?: No  Difficulty affording diabetes testing supplies?: No    Penny Harris, BIPIN, BRENT, [facility]  Time Spent: 60 minutes  Encounter Type: Individual     Diabetes medication dose changes were made via the [facility] Standing Orders  under the patient's referring provider.

## 2025-02-06 NOTE — LETTER
2/6/2025         RE: Shila Batres  [address] [facility] [address]        Dear Colleague,    Thank you for referring your patient, Shila Batres, to the [facility] [place]. Please see a copy of my visit note below.        Diabetes Self-Management Education & Support    Type of Service: In Person Visit      Assessment  Ketones: neg-large.   Fasting blood glucoses: 33% in target.  After breakfast: 87% in target.  After lunch: 83% in target. (Not using the first day since this is when I taught her how to check her BG and she was not following the meal plan)  After dinner: 100% in target.    Has changed her eating since our last visit. She noticed that juice raised her glucose the first day of testing so she stopped straight juice and pop after that day. Since then, her post meal numbers have been in target.    Couple days last week with no bedtime snack last week which is likely why she had large ketones and also higher FBS results.  The past couple nights she has had a bedtime snack (cereal) and her FBS has been in target and her ketones negative. Emphasized with her today the importance of checking her BG 4x/d and not missing days and also of the importance of the bedtime snack. Educated on having 30 grams of carb and what this looks like with some ideas AND having protein with this which she has not been having. Gave her a list in her AVS of HS snack ideas for both carbs and protein.     Since her last couple FBS were in target with a snack, will give her this weekend to see if they stay in target. If they are high, told her that we will need to start insulin.  Since she has [facility], I scheduled her with us as a phone appointment on Monday to review her BGs and order insulin if needed.      Intervention  Educational topics covered today:  Insulin injection technique taught using a Vial and Syringe and insulin pen in case she needs  insulin which I suspect she will. Patient verbalized understanding and was able to perform an accurate return demonstration of injection technique.  Discussed mixing insulin, storage, sharps, new needle each use, site selection, action of insulin and hypoglycemia signs, symptoms and treatment.        Educational Materials provided today:  Measuring and Injecting Insulin    Patient verbalized understanding of diabetes self-management education concepts discussed, opportunities for ongoing education and support, and recommendations provided today    Plan  She is going to work on having a bedtime snack and ensuring it is 30 grams of carb AND has protein. Gave her a list of options to choose from.   Follow up scheduled via phone on Monday 2/10 to review blood sugars and order insulin if needed.   Recommend we continue to follow Shila by phone as she has not been updating us via [facility] with her numbers.   Emphasized importance of checking BG 4x/day and not missing day.   Check glucose four times daily, before breakfast and 1 hour after each meal.  Check ketones once a week when readings are consistently negative.  Continue with recommended physical activity.  Continue to follow recommended meal plan: 30-45g carbohydratess at breakfast, 45-60g carbohydratess at lunch, 45-60g carbohydrates at supper, 15-30g carbohydrates at snacks.  Follow consistent carbohydrate meal plan, eat carbohydrates and protein/fat at all meals/snacks.    Send in Glucose Log (blood sugars) via [facility] or call in 3-4 days. If 3 or more blood sugars are above the goal at a given time, or if Ketones are small, moderate or large, call or [facility] message the diabetes educator.    Follow-up:    Follow up on Upcoming Diabetes Ed Appointments     Visit Type Date Time Department    [facility] ED SHORT 2/10/2025  9:30 AM  DIABETES ED        Subjective/Objective  Shila is an 39 year old year old, presenting for the following diabetes education related to:       Accompanied by: Self  Diabetes management related comments/concerns: Here to learn insulin  Gestational weeks: 34w3d  Hospital planned for delivery: [facility]  Number of previous pregnancies: 3  Had any babies over 9 lbs: No  Previously had Gestational Diabetes: Yes  Had Diabetes Education before: No  Previous insulin or other diabetes medication during that pregnancy: No  Have you ever had thyroid problems or taken thyroid medication?: No  Heart disease, mitral valve prolapse or rheumatic fever?: No  Hypertension : (!) Yes  High Cholesterol: No  High Triglycerides: No  Do you use tobacco products?: No  Do you drink beer, wine or hard liquor?: No    Cultural Influences/Ethnic Background:   or     LMP 06/10/2024       Estimated Date of Delivery: Mar 17, 2025    Blood Glucose/Ketone Log:         Healthy Eating:  Pre-pregnancy weight (lbs): 160  Barrier to exercise: None  Cultural/Moravian diet restrictions?: No  Meal planning/habits: None  How many times a week on average do you eat food made away from home (restaurant/take-out)?: 3  Meals include: Breakfast  Breakfast: bread x 1, 3 boiled eggs, salad (with chick filet sauce on it and lemon)  Lunch: [place] with vegetables (green cabbage) and claire juice mixed with water  Dinner: meat with rice (1 fist) and vegetables and water  Snacks: AM snack: bar or fruit (1 cutie and 1 apple); PM snack: granola bar or vegetables (broccoli/carrots); HS snack: cereal sometimes (last night) or does not have  Beverages: Water, Coffee, Juice, Diet soda  How many servings of fruits/vegetables per day: 1  Biggest challenges to healthy eating: Portion control, Eating out  Pre- vitamin?: Yes  Supplements?: No  Experiencing nausea?: No  Experiencing heartburn?: Yes    Healthy Coping:  Emotional response to diabetes: Ready to learn  Informal Support system:: Family  Stage of change: ACTION (Actively working towards change)    Current Management:  Taking medications for  gestational diabetes?: No  Difficulty affording diabetes testing supplies?: No    Penny Harris, BIPIN, BRENT, [facility]  Time Spent: 60 minutes  Encounter Type: Individual     Diabetes medication dose changes were made via the [facility] Standing Orders under the patient's referring provider.

## 2025-02-06 NOTE — PATIENT INSTRUCTIONS
Check blood sugar more often 1 hour after dinner.     If lunch blood sugar is too high, change  from claire juice with water to  just water.     Aim for a bar that is 15-30 grams of carbohydrate    When you go to [facility] in the evening, bring a fruit and some nuts to have  for a nighttime snack.     Make sure to always have some protein at your bedtime snack.     Here are some ideas for breakfast or bedtime snack. Pick a carbohydrate from the right and a protein from the left. If you have carbohydrates 30 grams of total carbohydrate and 3-5 grams of fiber that is even better.   Fruits are not listed as they can cause the blood sugar to raise blood sugar quickly and be used up early in the night. Fruits are better at meals, or morning or afternoon snack.     Protein/Fat list (about 14 grams of protein or 2 fat servings) Carbohydrate list (about 30 grams of carbohydrate)   2 slices of cheese English Muffin   2 TBS Peanut butter/[place] butter/Sun butter 10 crackers     cup Cottage cheese 2% 2 pieces of toast   2 oz any cooked meat - less than deck of cards size 1 hamburger or hot dog bun   1.5 oz of soy nuts 1 whole wheat olu   Avocado or guacamole 6 stefanie cracker squares     cup tuna - can add mayonnaise 1 cup full fat ice cream - no candy or sauce   2 eggs - boiled, poached, fried, scrambled, omelet 30 chips   1 veggie marci (might have carbohydrates also) Greek yogurt - 30 grams of carbohydrate   2 TBS Coconut 2 - 6 inch tortillas corn or flour   12 shrimp - not breaded 2 toaster waffles - no syrup   2-1oz meatballs   bagel   2 Tbs cream cheese - plain, veggie, salmon - no fruit or honey. 6 cups popcorn - unsweetened     cup of nuts Granola bar of 3o grams of carbohydrate   10 olives 1 cup of unsweetened lentils or beans.    Tofu or Temph 4-5oz 1 cup potato salad     You can always add vegetables with dip, salad dressing or salsa also.      1. If insulin is needed:      - Rotate injection sites, keeping at least 1  "inch apart from last injection site and 2 inches away from belly button or surgical scars.    -If you have a cloudy insulin, mix the insulin gently by rolling between your hands 10 times and turning upside down and right side up 10 times. Do not shake.     2. Pen - Use a new pen needle for each injection. Always remove pen needle from the insulin pen after use and do not store insulin pens with the needle on the pen. Do a 2 unit \"prime\" before each injection, be sure a drop or stream of insulin comes out of the needle before you give your injection. After you inject, hold the needle under the skin to the count of 10 to be sure all of the insulin goes in.      3. Store insulin you are not using in the refrigerator (do not freeze). Take new insulin out of the refrigerator a few hours prior to use to bring to room temperature.     4. Once opened, keep at room temperature.  Do not use the opened insulin after this time has passed, even if there is still medicine inside.     5. Always carry your blood sugar meter and a sugar source like glucose tablets with you in case of a low blood sugar.  Signs/symptoms of low blood sugar include (but are not limited to): sweating, shaking, feeling dizzy or weak, extreme hunger, headache, feeling crabby or confused, numbness or tingling of mouth/lips.  If you have these symptoms check your blood sugar if you can and then consume carbohydrate (sugar). In pregnancy, a low blood sugar is < 65 mg/dL.   If your blood sugar is < 65  mg/dL, consume 15 grams of fast-acting carbohydrate (4 glucose tabs OR 4 oz juice or non-diet pop or juice OR 2 Tbsp dried fruit OR 5-7 lifesavers OR 1 Tbsp sugar) and wait 15 minutes. Check blood sugar again and if not rising, repeat.    6. Call your doctor or diabetes educator if you begin having low blood sugars or if you have questions or concerns.     Follow-up: Monday  2/10 @ 9:30 am with Lottie and she will call you.    [facility] Diabetes Education and " Nutrition Services for the [facility] Area:  For Your Diabetes Education or Nutrition Appointments Call:  [phone]   For Diabetes or Nutrition Related Questions:   Phone: [phone]  Send [employer] Message   If you need a medication refill please contact your pharmacy. Please allow 3 business days for your refills to be completed.

## 2025-02-07 ENCOUNTER — HOSPITAL ENCOUNTER (OUTPATIENT)
Dept: ULTRASOUND IMAGING | Facility: CLINIC | Age: 40
Discharge: HOME | End: 2025-02-07
Attending: OBSTETRICS & GYNECOLOGY | Admitting: OBSTETRICS & GYNECOLOGY
Payer: COMMERCIAL

## 2025-02-07 DIAGNOSIS — O10.919 CHRONIC HYPERTENSION AFFECTING PREGNANCY: ICD-10-CM

## 2025-02-07 PROCEDURE — 76819 FETAL BIOPHYS PROFIL W/O NST: CPT

## 2025-02-10 ENCOUNTER — VIRTUAL VISIT (OUTPATIENT)
Dept: EDUCATION SERVICES | Facility: CLINIC | Age: 40
End: 2025-02-10
Payer: COMMERCIAL

## 2025-02-10 DIAGNOSIS — O24.410 WHITE CLASSIFICATION A1 GESTATIONAL DIABETES MELLITUS (GDM), DIET CONTROLLED: Primary | ICD-10-CM

## 2025-02-10 PROCEDURE — 99207 PR NONPHYSICIAN TELEPHONE ASSESSMENT 5-10 MIN: CPT | Mod: 93

## 2025-02-10 NOTE — LETTER
2/10/2025         RE: Shila Batres  [address]  [place] [address]        Dear Colleague,    Thank you for referring your patient, Shila Batres, to the [facility]. Please see a copy of my visit note below.    Diabetes and Pregnancy Follow-up  Type of Service: Telephone Visit/ 7 minutes     Originating Location (Patient Location): Home  Distant Location (Provider Location): [facility]  Mode of Communication:  Telephone     Telephone Visit Start Time: 9:37 AM  Telephone Visit End Time (telephone visit stop time): 9:44 AM     How would patient like to obtain AVS? not needed      Subjective/Objective:    Shila Batres was called for a scheduled BG review. Last date of communication was: 2/6/25.    Gestational diabetes is being managed with diet and activity    Taking diabetes medications: no    Estimated Date of Delivery: Mar 17, 2025    Blood Glucose/Ketone Log:    Date Ketones Fasting Post Breakfast Post Lunch Post Supper   2/7 negative 89 112 115 100   2/8 negative 83 120 111 115   2/9  95 110 126 111   2/10 trace 88      (Testing 1 hour post meals)    Assessment:  Ketones: negative x2, trace x1.   Fasting blood glucoses: 100% in target.  After breakfast: 100% in target.  Before lunch: n/a% in target.  After lunch: 100% in target.  Before dinner: n/a% in target.  After dinner: 100% in target.    Patient with no questions/concerns.  Reports she made changes to her intake, specifically replacing soda and juice with water, since her last visit with ANGE colleague on 2/6/25.  Discussed and reviewed when to contact ANGE.    Plan/Response:  No changes in the patient's current treatment plan.  Follow-up in 1 week.    Lottie Bui, MPH, RD, ANGE, LD 2/10/2025    Time Spent: 7 minutes    Any diabetes medication dose changes were made via the CDE Protocol and Collaborative Practice Agreement with the patient's referring provider. A copy of this encounter was shared  with the provider.

## 2025-02-10 NOTE — PROGRESS NOTES
Diabetes and Pregnancy Follow-up  Type of Service: Telephone Visit/ 7 minutes     Originating Location (Patient Location): Home  [place] [facility]  Mode of Communication:  Telephone     Telephone Visit Start Time: 9:37 AM  Telephone Visit End Time (telephone visit stop time): 9:44 AM     How would patient like to obtain AVS? not needed      Subjective/Objective:    Shila Batres was called for a scheduled BG review. Last date of communication was: 2/6/25.    Gestational diabetes is being managed with diet and activity    Taking diabetes medications: no    Estimated Date of Delivery: Mar 17, 2025    Blood Glucose/Ketone Log:    Date Ketones Fasting Post Breakfast Post Lunch Post Supper   2/7 negative 89 112 115 100   2/8 negative 83 120 111 115   2/9  95 110 126 111   2/10 trace 88      (Testing 1 hour post meals)    Assessment:  Ketones: negative x2, trace x1.   Fasting blood glucoses: 100% in target.  After breakfast: 100% in target.  Before lunch: n/a% in target.  After lunch: 100% in target.  Before dinner: n/a% in target.  After dinner: 100% in target.    Patient with no questions/concerns.  Reports she made changes to her intake, specifically replacing soda and juice with water, since her last visit with ANGE colleague on 2/6/25.  Discussed and reviewed when to contact ANGE.    Plan/Response:  No changes in the patient's current treatment plan.  Follow-up in 1 week.    Lottie Bui, MPH, RD, ANGE, LD 2/10/2025    Time Spent: 7 minutes    Any diabetes medication dose changes were made via the CDE Protocol and Collaborative Practice Agreement with the patient's referring provider. A copy of this encounter was shared with the provider.

## 2025-02-14 ENCOUNTER — HOSPITAL ENCOUNTER (OUTPATIENT)
Dept: ULTRASOUND IMAGING | Facility: CLINIC | Age: 40
Discharge: HOME | End: 2025-02-14
Attending: OBSTETRICS & GYNECOLOGY | Admitting: OBSTETRICS & GYNECOLOGY
Payer: COMMERCIAL

## 2025-02-14 DIAGNOSIS — O10.919 CHRONIC HYPERTENSION AFFECTING PREGNANCY: ICD-10-CM

## 2025-02-14 PROCEDURE — 76819 FETAL BIOPHYS PROFIL W/O NST: CPT

## 2025-02-14 PROCEDURE — 76819 FETAL BIOPHYS PROFIL W/O NST: CPT | Mod: 26 | Performed by: OBSTETRICS & GYNECOLOGY

## 2025-02-17 ENCOUNTER — VIRTUAL VISIT (OUTPATIENT)
Dept: EDUCATION SERVICES | Facility: CLINIC | Age: 40
End: 2025-02-17
Payer: COMMERCIAL

## 2025-02-17 ENCOUNTER — ALLIED HEALTH/NURSE VISIT (OUTPATIENT)
Dept: OBGYN | Facility: CLINIC | Age: 40
End: 2025-02-17
Payer: COMMERCIAL

## 2025-02-17 VITALS — SYSTOLIC BLOOD PRESSURE: 138 MMHG | DIASTOLIC BLOOD PRESSURE: 88 MMHG | WEIGHT: 181.7 LBS | BODY MASS INDEX: 33.23 KG/M2

## 2025-02-17 DIAGNOSIS — O24.410 WHITE CLASSIFICATION A1 GESTATIONAL DIABETES MELLITUS (GDM), DIET CONTROLLED: Primary | ICD-10-CM

## 2025-02-17 DIAGNOSIS — O16.2 HYPERTENSION AFFECTING PREGNANCY IN SECOND TRIMESTER: Primary | ICD-10-CM

## 2025-02-17 LAB
ALBUMIN MFR UR ELPH: 23.2 MG/DL
ALT SERPL W P-5'-P-CCNC: 39 U/L (ref 0–50)
AST SERPL W P-5'-P-CCNC: 33 U/L (ref 0–45)
BASOPHILS # BLD AUTO: 0 10E3/UL (ref 0–0.2)
BASOPHILS NFR BLD AUTO: 0 %
BUN SERPL-MCNC: 8.1 MG/DL (ref 6–20)
CREAT SERPL-MCNC: 0.68 MG/DL (ref 0.51–0.95)
CREAT UR-MCNC: 193 MG/DL
EGFRCR SERPLBLD CKD-EPI 2021: >90 ML/MIN/1.73M2
EOSINOPHIL # BLD AUTO: 0.2 10E3/UL (ref 0–0.7)
EOSINOPHIL NFR BLD AUTO: 2 %
ERYTHROCYTE [DISTWIDTH] IN BLOOD BY AUTOMATED COUNT: 16.9 % (ref 10–15)
HCT VFR BLD AUTO: 33.4 % (ref 35–47)
HGB BLD-MCNC: 11.3 G/DL (ref 11.7–15.7)
IMM GRANULOCYTES # BLD: 0.1 10E3/UL
IMM GRANULOCYTES NFR BLD: 1 %
LYMPHOCYTES # BLD AUTO: 2.2 10E3/UL (ref 0.8–5.3)
LYMPHOCYTES NFR BLD AUTO: 26 %
MCH RBC QN AUTO: 27.6 PG (ref 26.5–33)
MCHC RBC AUTO-ENTMCNC: 33.8 G/DL (ref 31.5–36.5)
MCV RBC AUTO: 82 FL (ref 78–100)
MONOCYTES # BLD AUTO: 0.7 10E3/UL (ref 0–1.3)
MONOCYTES NFR BLD AUTO: 8 %
NEUTROPHILS # BLD AUTO: 5.4 10E3/UL (ref 1.6–8.3)
NEUTROPHILS NFR BLD AUTO: 63 %
PLATELET # BLD AUTO: 312 10E3/UL (ref 150–450)
PROT/CREAT 24H UR: 0.12 MG/MG CR (ref 0–0.2)
RBC # BLD AUTO: 4.09 10E6/UL (ref 3.8–5.2)
URATE SERPL-MCNC: 4.2 MG/DL (ref 2.4–5.7)
WBC # BLD AUTO: 8.6 10E3/UL (ref 4–11)

## 2025-02-17 PROCEDURE — 82565 ASSAY OF CREATININE: CPT

## 2025-02-17 PROCEDURE — 85025 COMPLETE CBC W/AUTO DIFF WBC: CPT

## 2025-02-17 PROCEDURE — 98967 PH1 ASSMT&MGMT NQHP 11-20: CPT | Mod: 93

## 2025-02-17 PROCEDURE — 84460 ALANINE AMINO (ALT) (SGPT): CPT

## 2025-02-17 PROCEDURE — 84550 ASSAY OF BLOOD/URIC ACID: CPT

## 2025-02-17 PROCEDURE — 84520 ASSAY OF UREA NITROGEN: CPT

## 2025-02-17 PROCEDURE — 36415 COLL VENOUS BLD VENIPUNCTURE: CPT

## 2025-02-17 PROCEDURE — 84156 ASSAY OF PROTEIN URINE: CPT

## 2025-02-17 PROCEDURE — 84450 TRANSFERASE (AST) (SGOT): CPT

## 2025-02-17 PROCEDURE — 99207 PR NO CHARGE NURSE ONLY: CPT

## 2025-02-17 NOTE — NURSING NOTE
Per Dr. Oliveira. Pt should just keep her appt for 2/19.    Pt was previously advised to check her BP's twice daily and to report any readings higher than 140's/90's.    Also advised to report any sx that are concerning.    Aissatou CAMARILLO RN  [facility]

## 2025-02-17 NOTE — NURSING NOTE
Subjective:  Shila is being seen in clinic for a blood pressure check due to Chronic hypertension . Patient is Pregnant.    Patient reports taking the following antihypertensive medications: nifedipine and baby ASA    Patient reports the following symptoms: no hypertension symptoms.     Objective:  BP (!) 142/92   Wt 82.4 kg (181 lb 11.2 oz)   LMP 06/10/2024   BMI 33.23 kg/m   Blood pressure taken on       Two blood pressures taken, at least one minute apart.     Plan:    If patient's BP is LOW (Systolic less than 100 OR Diastolic less than 60): RN triage team to be contacted ASAP for next steps.     If patient's BP is NORMAL (Systolic between 100-139 OR Diastolic between 60-89): OK for patient to leave clinic and rooming staff to route chart to ordering provider.     If patient's BP is HIGH (Systolic between 140-159 OR Diastolic between ): RN triage team to be contacted ASAP for next steps.    If patient's BP is VERY HIGH (Systolic 160 and higher OR Diastolic 110 and higher): RN triage team to be contacted ASAP for next steps.       Signs and symptoms of hypertension reviewed with patient.     Pt was sent to the lab to have PIH labs done.     I will review the BP readings with the on-call provider when she is in clinic.    Aissatou CAMARILLO RN  [facility]

## 2025-02-17 NOTE — LETTER
2/17/2025         RE: Shila Batres  [address] [facility] [address]        Dear Colleague,    Thank you for referring your patient, Shila Batres, to the [facility]. Please see a copy of my visit note below.    Diabetes and Pregnancy Follow-up  Type of Service: Telephone Visit/ 13 minutes     Originating Location (Patient Location): Home  Distant Location (Provider Location): [facility]  Mode of Communication:  Telephone     Telephone Visit Start Time: 10:23 AM  Telephone Visit End Time (telephone visit stop time): 10:36 AM     How would patient like to obtain AVS? MyChart      Subjective/Objective:    Shila Batres was called for a scheduled BG review. Last date of communication was: 2/10/25.    Gestational diabetes is being managed with diet and activity    Taking diabetes medications: no    Estimated Date of Delivery: Mar 17, 2025    Blood Glucose/Ketone Log:    Date Ketones Fasting Post Breakfast Post Lunch Post Supper   2/10   125 130 92   2/11 negative 90 116 126    2/12 trace 111 128 130 100   2/13 trace 96 128 130    2/14 trace 88 116 111 92   2/15 negative 98 125 130 92   2/16 trace 93      2/17 large       (Testing 1 hour post meal)    Assessment:  Ketones: negative x2, trace x4, large x1  Fasting blood glucoses: 50% in target.  After breakfast: 100% in target.  Before lunch: n/a% in target.  After lunch: 100% in target.  Before dinner: n/a% in target.  After dinner: 100% in target.    Patient not yet ready to start insulin at HS to improve fasting glucoses.  States she is not always having a HS snack.  Agrees to have HS snack every night and follow up in 3 days.     Plan/Response:  Test glucose 4 times per day:   Fasting (when you first awake for the day): 95 mg/dL or below   1 hour after breakfast: 140 mg/dL or below   1 hour after lunch: 140 mg/dL or below   1 hour after dinner: 140 mg/dL or below     Please bring your meter and log book to all  appointments     If you miss 1 hour after meal test, test 2 hours after the meal.  Goal 2 hours after is 120 mg/dL or below.     2.  Check your urine ketones once a day, when you first awake for the day until they are negative to trace for 7 days in a row.  Then decrease and check once a week.     3. Have a snack every night just before going to bed:  an individual container of Greek yogurt (try Chobani Less Sugar), whole grain crackers and cheese, 1 cup chocolate Fairlife milk, a Nature Valley Protein bar, 1 slice whole grain bread + protein (peanut butter OR a cooked egg)      4.  Aim for 20-30 minutes of activity most days of the week (with the okay of your OB provider).      5.  Follow up: with Lottie via phone on Thursday, February 20th    6.  Call Diabetes Education at [phone] or send a [employer] message with:   -questions or concerns   -ketones that are small, moderate, or large   -3 or more blood sugars above target in a 7 day period    Lottie Bui, MPH, RD, CDCES, LD 2/17/2025    Time Spent: 13 minutes    Any diabetes medication dose changes were made via the CDE Protocol and Collaborative Practice Agreement with the patient's referring provider. A copy of this encounter was shared with the provider.

## 2025-02-17 NOTE — PROGRESS NOTES
Diabetes and Pregnancy Follow-up  Type of Service: Telephone Visit/ 13 minutes     Originating Location (Patient Location): Home  Distant Location (Provider Location): [facility]  Mode of Communication:  Telephone     Telephone Visit Start Time: 10:23 AM  Telephone Visit End Time (telephone visit stop time): 10:36 AM     How would patient like to obtain AVS? Marie      Subjective/Objective:    Shila Batres was called for a scheduled BG review. Last date of communication was: 2/10/25.    Gestational diabetes is being managed with diet and activity    Taking diabetes medications: no    Estimated Date of Delivery: Mar 17, 2025    Blood Glucose/Ketone Log:    Date Ketones Fasting Post Breakfast Post Lunch Post Supper   2/10   125 130 92   2/11 negative 90 116 126    2/12 trace 111 128 130 100   2/13 trace 96 128 130    2/14 trace 88 116 111 92   2/15 negative 98 125 130 92   2/16 trace 93      2/17 large       (Testing 1 hour post meal)    Assessment:  Ketones: negative x2, trace x4, large x1  Fasting blood glucoses: 50% in target.  After breakfast: 100% in target.  Before lunch: n/a% in target.  After lunch: 100% in target.  Before dinner: n/a% in target.  After dinner: 100% in target.    Patient not yet ready to start insulin at HS to improve fasting glucoses.  States she is not always having a HS snack.  Agrees to have HS snack every night and follow up in 3 days.  Would like to participate in [facility].  She was provided the number for the agency serving her residence.    Plan/Response:  Test glucose 4 times per day:   Fasting (when you first awake for the day): 95 mg/dL or below   1 hour after breakfast: 140 mg/dL or below   1 hour after lunch: 140 mg/dL or below   1 hour after dinner: 140 mg/dL or below     Please bring your meter and log book to all appointments     If you miss 1 hour after meal test, test 2 hours after the meal.  Goal 2 hours after is 120 mg/dL or below.     2.  Check your urine  ketones once a day, when you first awake for the day until they are negative to trace for 7 days in a row.  Then decrease and check once a week.     3. Have a snack every night just before going to bed:  an individual container of Greek yogurt (try Chobani Less Sugar), whole grain crackers and cheese, 1 cup chocolate Fairlife milk, a Nature Valley Protein bar, 1 slice whole grain bread + protein (peanut butter OR a cooked egg)      4.  Aim for 20-30 minutes of activity most days of the week (with the okay of your OB provider).      5.  Follow up: with Lottie via phone on Thursday, February 20th    6.  Call Diabetes Education at [phone] or send a [employer] message with:   -questions or concerns   -ketones that are small, moderate, or large   -3 or more blood sugars above target in a 7 day period    Lottie Bui, MPH, RD, CDCES, LD 2/17/2025    Time Spent: 13 minutes    Any diabetes medication dose changes were made via the CDE Protocol and Collaborative Practice Agreement with the patient's referring provider. A copy of this encounter was shared with the provider.

## 2025-02-18 NOTE — PATIENT INSTRUCTIONS
Test glucose 4 times per day:   Fasting (when you first awake for the day): 95 mg/dL or below   1 hour after breakfast: 140 mg/dL or below   1 hour after lunch: 140 mg/dL or below   1 hour after dinner: 140 mg/dL or below     Please bring your meter and log book to all appointments     If you miss 1 hour after meal test, test 2 hours after the meal.  Goal 2 hours after is 120 mg/dL or below.     2.  Check your urine ketones once a day, when you first awake for the day until they are negative to trace for 7 days in a row.  Then decrease and check once a week.     3. Have a snack every night just before going to bed:  an individual container of Greek yogurt (try Chobani Less Sugar), whole grain crackers and cheese, 1 cup chocolate Fairlife milk, a Nature Valley Protein bar, 1 slice whole grain bread + protein (peanut butter OR a cooked egg)      4.  Aim for 20-30 minutes of activity most days of the week (with the okay of your OB provider).      5.  Follow up: with Lottie via phone on Thursday, February 20th    6.  Call Diabetes Education at [phone] or send a [employer] message with:   -questions or concerns   -ketones that are small, moderate, or large   -3 or more blood sugars above target in a 7 day period    Thank you,     Lottie Bui, MPH, RD, CDCES, LD 2/17/2025

## 2025-02-19 ENCOUNTER — PRENATAL OFFICE VISIT (OUTPATIENT)
Dept: OBGYN | Facility: CLINIC | Age: 40
End: 2025-02-19
Payer: COMMERCIAL

## 2025-02-19 VITALS — BODY MASS INDEX: 33.64 KG/M2 | WEIGHT: 183.9 LBS | DIASTOLIC BLOOD PRESSURE: 84 MMHG | SYSTOLIC BLOOD PRESSURE: 138 MMHG

## 2025-02-19 DIAGNOSIS — N83.209 OVARIAN CYST DURING PREGNANCY IN SECOND TRIMESTER: ICD-10-CM

## 2025-02-19 DIAGNOSIS — O16.3 HYPERTENSION AFFECTING PREGNANCY IN THIRD TRIMESTER: Primary | ICD-10-CM

## 2025-02-19 DIAGNOSIS — R12 HEARTBURN DURING PREGNANCY IN THIRD TRIMESTER: ICD-10-CM

## 2025-02-19 DIAGNOSIS — O24.410 WHITE CLASSIFICATION A1 GESTATIONAL DIABETES MELLITUS (GDM), DIET CONTROLLED: ICD-10-CM

## 2025-02-19 DIAGNOSIS — Z86.32 HISTORY OF GESTATIONAL DIABETES IN PRIOR PREGNANCY, CURRENTLY PREGNANT: ICD-10-CM

## 2025-02-19 DIAGNOSIS — O20.8 SUBCHORIONIC HEMORRHAGE OF PLACENTA IN FIRST TRIMESTER: ICD-10-CM

## 2025-02-19 DIAGNOSIS — O34.82 OVARIAN CYST DURING PREGNANCY IN SECOND TRIMESTER: ICD-10-CM

## 2025-02-19 DIAGNOSIS — O09.523 MULTIGRAVIDA OF ADVANCED MATERNAL AGE IN THIRD TRIMESTER: ICD-10-CM

## 2025-02-19 DIAGNOSIS — O26.893 HEARTBURN DURING PREGNANCY IN THIRD TRIMESTER: ICD-10-CM

## 2025-02-19 DIAGNOSIS — O09.299 HISTORY OF GESTATIONAL DIABETES IN PRIOR PREGNANCY, CURRENTLY PREGNANT: ICD-10-CM

## 2025-02-19 LAB
ALBUMIN MFR UR ELPH: 18.7 MG/DL
ALBUMIN SERPL BCG-MCNC: 3.2 G/DL (ref 3.5–5.2)
ALP SERPL-CCNC: 313 U/L (ref 40–150)
ALT SERPL W P-5'-P-CCNC: 39 U/L (ref 0–50)
ANION GAP SERPL CALCULATED.3IONS-SCNC: 13 MMOL/L (ref 7–15)
AST SERPL W P-5'-P-CCNC: 38 U/L (ref 0–45)
BILIRUB SERPL-MCNC: 0.3 MG/DL
BUN SERPL-MCNC: 9 MG/DL (ref 6–20)
CALCIUM SERPL-MCNC: 9.3 MG/DL (ref 8.8–10.4)
CHLORIDE SERPL-SCNC: 103 MMOL/L (ref 98–107)
CREAT SERPL-MCNC: 0.64 MG/DL (ref 0.51–0.95)
CREAT UR-MCNC: 143 MG/DL
EGFRCR SERPLBLD CKD-EPI 2021: >90 ML/MIN/1.73M2
ERYTHROCYTE [DISTWIDTH] IN BLOOD BY AUTOMATED COUNT: 17.2 % (ref 10–15)
GLUCOSE SERPL-MCNC: 123 MG/DL (ref 70–99)
HCO3 SERPL-SCNC: 20 MMOL/L (ref 22–29)
HCT VFR BLD AUTO: 34.5 % (ref 35–47)
HGB BLD-MCNC: 11.3 G/DL (ref 11.7–15.7)
MCH RBC QN AUTO: 26.7 PG (ref 26.5–33)
MCHC RBC AUTO-ENTMCNC: 32.8 G/DL (ref 31.5–36.5)
MCV RBC AUTO: 82 FL (ref 78–100)
PLATELET # BLD AUTO: 337 10E3/UL (ref 150–450)
POTASSIUM SERPL-SCNC: 4.1 MMOL/L (ref 3.4–5.3)
PROT SERPL-MCNC: 6.3 G/DL (ref 6.4–8.3)
PROT/CREAT 24H UR: 0.13 MG/MG CR (ref 0–0.2)
RBC # BLD AUTO: 4.23 10E6/UL (ref 3.8–5.2)
SODIUM SERPL-SCNC: 136 MMOL/L (ref 135–145)
WBC # BLD AUTO: 10.3 10E3/UL (ref 4–11)

## 2025-02-19 PROCEDURE — 99207 PR COMPLICATED OB VISIT: CPT | Performed by: OBSTETRICS & GYNECOLOGY

## 2025-02-19 PROCEDURE — 80053 COMPREHEN METABOLIC PANEL: CPT | Performed by: OBSTETRICS & GYNECOLOGY

## 2025-02-19 PROCEDURE — 87653 STREP B DNA AMP PROBE: CPT | Performed by: OBSTETRICS & GYNECOLOGY

## 2025-02-19 PROCEDURE — 84156 ASSAY OF PROTEIN URINE: CPT | Performed by: OBSTETRICS & GYNECOLOGY

## 2025-02-19 PROCEDURE — 85027 COMPLETE CBC AUTOMATED: CPT | Performed by: OBSTETRICS & GYNECOLOGY

## 2025-02-19 PROCEDURE — 36415 COLL VENOUS BLD VENIPUNCTURE: CPT | Performed by: OBSTETRICS & GYNECOLOGY

## 2025-02-19 RX ORDER — FAMOTIDINE 20 MG/1
20 TABLET, FILM COATED ORAL 2 TIMES DAILY
Qty: 60 TABLET | Refills: 0 | Status: SHIPPED | OUTPATIENT
Start: 2025-02-19

## 2025-02-19 NOTE — NURSING NOTE
"Chief Complaint   Patient presents with    Prenatal Care     36 weeks 2 days  GBS Due   GDM   MFM  & 25          Initial BP (!) 146/98 (BP Location: Right arm, Cuff Size: Adult Regular)   Wt 83.4 kg (183 lb 14.4 oz)   LMP 06/10/2024   BMI 33.64 kg/m   Estimated body mass index is 33.64 kg/m  as calculated from the following:    Height as of 24: 1.575 m (5' 2\").    Weight as of this encounter: 83.4 kg (183 lb 14.4 oz).  BP completed using cuff size: regular    Questioned patient about current smoking habits.  Pt. has never smoked.    36w2d       The following HM Due: NONE      +FM Daily   MFM scheduled   GDM following ALEJANDRO Stark, CMA on 2025 at 11:30 AM             "

## 2025-02-19 NOTE — PROGRESS NOTES
No c/o's except heartburn, Rx Pepcid sent to pharm. No HA, vis changes, RUQ pain. Still having BPs in mild range (146/98 on arrival, down to 138/84 after rest) despite Nifedipine ER, also on baby ASA. Repeat PreE labs today. Also GDM A1 as co-risk factor. FBS-96, 1hr PP-126. Has follow-up MFM U/S/BPP in 2 days. Due to BPs coming up elevated twice in last 2 visits, I advised Pt that we'll likely want to consider delivery between 37-38 wks. GBS done. Cx-0/50/Float/Vtx. Fetal movement counts BID,  labor/premature rupture of membranes precautions reviewed.  RTC 1 week(s). Unless MFM has other recommendations, will plan set up induction at next visit.    Encounter Diagnoses   Name Primary?    Hypertension affecting pregnancy in third trimester Yes    White classification A1 gestational diabetes mellitus (GDM), diet controlled     Multigravida of advanced maternal age in third trimester     History of gestational diabetes in prior pregnancy, currently pregnant     Subchorionic hemorrhage of placenta in first trimester     Ovarian cyst during pregnancy in second trimester     Heartburn during pregnancy in third trimester        Risk factors listed above are stable and being addressed as noted.    Yfn Godoy MD  [facility]

## 2025-02-20 LAB — GP B STREP DNA SPEC QL NAA+PROBE: NEGATIVE

## 2025-02-21 ENCOUNTER — HOSPITAL ENCOUNTER (OUTPATIENT)
Dept: ULTRASOUND IMAGING | Facility: CLINIC | Age: 40
Discharge: HOME | End: 2025-02-21
Attending: STUDENT IN AN ORGANIZED HEALTH CARE EDUCATION/TRAINING PROGRAM | Admitting: STUDENT IN AN ORGANIZED HEALTH CARE EDUCATION/TRAINING PROGRAM
Payer: COMMERCIAL

## 2025-02-21 DIAGNOSIS — O24.410 DIET CONTROLLED GESTATIONAL DIABETES MELLITUS (GDM) IN THIRD TRIMESTER: ICD-10-CM

## 2025-02-21 PROCEDURE — 76816 OB US FOLLOW-UP PER FETUS: CPT

## 2025-02-21 PROCEDURE — 76819 FETAL BIOPHYS PROFIL W/O NST: CPT | Mod: 26 | Performed by: STUDENT IN AN ORGANIZED HEALTH CARE EDUCATION/TRAINING PROGRAM

## 2025-02-21 PROCEDURE — 76819 FETAL BIOPHYS PROFIL W/O NST: CPT

## 2025-02-21 PROCEDURE — 76816 OB US FOLLOW-UP PER FETUS: CPT | Mod: 26 | Performed by: STUDENT IN AN ORGANIZED HEALTH CARE EDUCATION/TRAINING PROGRAM

## 2025-02-28 ENCOUNTER — ANESTHESIA (OUTPATIENT)
Dept: OBGYN | Facility: CLINIC | Age: 40
End: 2025-02-28
Payer: COMMERCIAL

## 2025-02-28 ENCOUNTER — ANESTHESIA EVENT (OUTPATIENT)
Dept: OBGYN | Facility: CLINIC | Age: 40
End: 2025-02-28
Payer: COMMERCIAL

## 2025-02-28 ENCOUNTER — HOSPITAL ENCOUNTER (OUTPATIENT)
Dept: ULTRASOUND IMAGING | Facility: CLINIC | Age: 40
Discharge: HOME | End: 2025-02-28
Attending: STUDENT IN AN ORGANIZED HEALTH CARE EDUCATION/TRAINING PROGRAM | Admitting: STUDENT IN AN ORGANIZED HEALTH CARE EDUCATION/TRAINING PROGRAM
Payer: COMMERCIAL

## 2025-02-28 ENCOUNTER — HOSPITAL ENCOUNTER (INPATIENT)
Facility: CLINIC | Age: 40
LOS: 4 days | Discharge: HOME | End: 2025-03-04
Attending: OBSTETRICS & GYNECOLOGY | Admitting: OBSTETRICS & GYNECOLOGY
Payer: COMMERCIAL

## 2025-02-28 DIAGNOSIS — O09.299 HISTORY OF GESTATIONAL DIABETES IN PRIOR PREGNANCY, CURRENTLY PREGNANT: ICD-10-CM

## 2025-02-28 DIAGNOSIS — O10.919 CHRONIC HYPERTENSION AFFECTING PREGNANCY: ICD-10-CM

## 2025-02-28 DIAGNOSIS — O16.2 HYPERTENSION AFFECTING PREGNANCY IN SECOND TRIMESTER: ICD-10-CM

## 2025-02-28 DIAGNOSIS — N83.209 OVARIAN CYST DURING PREGNANCY IN SECOND TRIMESTER: ICD-10-CM

## 2025-02-28 DIAGNOSIS — O09.529 ANTEPARTUM MULTIGRAVIDA OF ADVANCED MATERNAL AGE: Primary | ICD-10-CM

## 2025-02-28 DIAGNOSIS — O34.82 OVARIAN CYST DURING PREGNANCY IN SECOND TRIMESTER: ICD-10-CM

## 2025-02-28 DIAGNOSIS — O16.3 HYPERTENSION AFFECTING PREGNANCY IN THIRD TRIMESTER: ICD-10-CM

## 2025-02-28 DIAGNOSIS — Z86.32 HISTORY OF GESTATIONAL DIABETES IN PRIOR PREGNANCY, CURRENTLY PREGNANT: ICD-10-CM

## 2025-02-28 LAB
ABO + RH BLD: NORMAL
ALBUMIN MFR UR ELPH: <6 MG/DL
ALBUMIN SERPL BCG-MCNC: 3.2 G/DL (ref 3.5–5.2)
ALBUMIN UR-MCNC: NEGATIVE MG/DL
ALP SERPL-CCNC: 360 U/L (ref 40–150)
ALT SERPL W P-5'-P-CCNC: 39 U/L (ref 0–50)
ANION GAP SERPL CALCULATED.3IONS-SCNC: 10 MMOL/L (ref 7–15)
APPEARANCE UR: CLEAR
AST SERPL W P-5'-P-CCNC: 29 U/L (ref 0–45)
BACTERIA #/AREA URNS HPF: ABNORMAL /HPF
BILIRUB SERPL-MCNC: 0.3 MG/DL
BILIRUB UR QL STRIP: NEGATIVE
BLD GP AB SCN SERPL QL: NEGATIVE
BUN SERPL-MCNC: 7.1 MG/DL (ref 6–20)
CALCIUM SERPL-MCNC: 8.8 MG/DL (ref 8.8–10.4)
CHLORIDE SERPL-SCNC: 105 MMOL/L (ref 98–107)
COLOR UR AUTO: ABNORMAL
CREAT SERPL-MCNC: 0.67 MG/DL (ref 0.51–0.95)
CREAT UR-MCNC: 19.9 MG/DL
EGFRCR SERPLBLD CKD-EPI 2021: >90 ML/MIN/1.73M2
ERYTHROCYTE [DISTWIDTH] IN BLOOD BY AUTOMATED COUNT: 17.8 % (ref 10–15)
EST. AVERAGE GLUCOSE BLD GHB EST-MCNC: 137 MG/DL
GLUCOSE BLDC GLUCOMTR-MCNC: 126 MG/DL (ref 70–99)
GLUCOSE SERPL-MCNC: 88 MG/DL (ref 70–99)
GLUCOSE UR STRIP-MCNC: NEGATIVE MG/DL
HBA1C MFR BLD: 6.4 %
HCO3 SERPL-SCNC: 20 MMOL/L (ref 22–29)
HCT VFR BLD AUTO: 33.2 % (ref 35–47)
HGB BLD-MCNC: 11.3 G/DL (ref 11.7–15.7)
HGB UR QL STRIP: NEGATIVE
HOLD SPECIMEN: NORMAL
KETONES UR STRIP-MCNC: NEGATIVE MG/DL
LEUKOCYTE ESTERASE UR QL STRIP: NEGATIVE
MCH RBC QN AUTO: 27.4 PG (ref 26.5–33)
MCHC RBC AUTO-ENTMCNC: 34 G/DL (ref 31.5–36.5)
MCV RBC AUTO: 80 FL (ref 78–100)
NITRATE UR QL: NEGATIVE
PH UR STRIP: 6.5 [PH] (ref 5–7)
PLATELET # BLD AUTO: 329 10E3/UL (ref 150–450)
POTASSIUM SERPL-SCNC: 4.3 MMOL/L (ref 3.4–5.3)
PROT SERPL-MCNC: 6.3 G/DL (ref 6.4–8.3)
PROT/CREAT 24H UR: NORMAL MG/G{CREAT}
RBC # BLD AUTO: 4.13 10E6/UL (ref 3.8–5.2)
RBC URINE: <1 /HPF
SODIUM SERPL-SCNC: 135 MMOL/L (ref 135–145)
SP GR UR STRIP: 1 (ref 1–1.03)
SPECIMEN EXP DATE BLD: NORMAL
SQUAMOUS EPITHELIAL: <1 /HPF
UROBILINOGEN UR STRIP-MCNC: NORMAL MG/DL
WBC # BLD AUTO: 9.4 10E3/UL (ref 4–11)
WBC URINE: <1 /HPF

## 2025-02-28 PROCEDURE — 36415 COLL VENOUS BLD VENIPUNCTURE: CPT | Performed by: OBSTETRICS & GYNECOLOGY

## 2025-02-28 PROCEDURE — 76818 FETAL BIOPHYS PROFILE W/NST: CPT | Mod: 26 | Performed by: STUDENT IN AN ORGANIZED HEALTH CARE EDUCATION/TRAINING PROGRAM

## 2025-02-28 PROCEDURE — 86850 RBC ANTIBODY SCREEN: CPT | Performed by: OBSTETRICS & GYNECOLOGY

## 2025-02-28 PROCEDURE — 84075 ASSAY ALKALINE PHOSPHATASE: CPT | Performed by: OBSTETRICS & GYNECOLOGY

## 2025-02-28 PROCEDURE — 86780 TREPONEMA PALLIDUM: CPT | Performed by: OBSTETRICS & GYNECOLOGY

## 2025-02-28 PROCEDURE — 84156 ASSAY OF PROTEIN URINE: CPT | Performed by: OBSTETRICS & GYNECOLOGY

## 2025-02-28 PROCEDURE — 120N000001 HC R&B MED SURG/OB

## 2025-02-28 PROCEDURE — 83036 HEMOGLOBIN GLYCOSYLATED A1C: CPT | Performed by: OBSTETRICS & GYNECOLOGY

## 2025-02-28 PROCEDURE — 82947 ASSAY GLUCOSE BLOOD QUANT: CPT | Performed by: OBSTETRICS & GYNECOLOGY

## 2025-02-28 PROCEDURE — 250N000013 HC RX MED GY IP 250 OP 250 PS 637: Performed by: OBSTETRICS & GYNECOLOGY

## 2025-02-28 PROCEDURE — 81003 URINALYSIS AUTO W/O SCOPE: CPT | Performed by: OBSTETRICS & GYNECOLOGY

## 2025-02-28 PROCEDURE — 99222 1ST HOSP IP/OBS MODERATE 55: CPT | Performed by: OBSTETRICS & GYNECOLOGY

## 2025-02-28 PROCEDURE — 258N000003 HC RX IP 258 OP 636: Performed by: OBSTETRICS & GYNECOLOGY

## 2025-02-28 PROCEDURE — 370N000003 HC ANESTHESIA WARD SERVICE: Performed by: ANESTHESIOLOGY

## 2025-02-28 PROCEDURE — 250N000011 HC RX IP 250 OP 636: Mod: JZ | Performed by: OBSTETRICS & GYNECOLOGY

## 2025-02-28 PROCEDURE — 82247 BILIRUBIN TOTAL: CPT | Performed by: OBSTETRICS & GYNECOLOGY

## 2025-02-28 PROCEDURE — G0463 HOSPITAL OUTPT CLINIC VISIT: HCPCS

## 2025-02-28 PROCEDURE — 76818 FETAL BIOPHYS PROFILE W/NST: CPT

## 2025-02-28 PROCEDURE — 85027 COMPLETE CBC AUTOMATED: CPT | Performed by: OBSTETRICS & GYNECOLOGY

## 2025-02-28 PROCEDURE — 86900 BLOOD TYPING SEROLOGIC ABO: CPT | Performed by: OBSTETRICS & GYNECOLOGY

## 2025-02-28 RX ORDER — CALCIUM GLUCONATE 94 MG/ML
1 INJECTION, SOLUTION INTRAVENOUS
Status: DISCONTINUED | OUTPATIENT
Start: 2025-02-28 | End: 2025-03-02

## 2025-02-28 RX ORDER — FENTANYL CITRATE-0.9 % NACL/PF 10 MCG/ML
100 PLASTIC BAG, INJECTION (ML) INTRAVENOUS EVERY 5 MIN PRN
Status: COMPLETED | OUTPATIENT
Start: 2025-02-28 | End: 2025-03-01

## 2025-02-28 RX ORDER — IBUPROFEN 800 MG/1
800 TABLET, FILM COATED ORAL
Status: COMPLETED | OUTPATIENT
Start: 2025-02-28 | End: 2025-03-02

## 2025-02-28 RX ORDER — NALOXONE HYDROCHLORIDE 0.4 MG/ML
0.2 INJECTION, SOLUTION INTRAMUSCULAR; INTRAVENOUS; SUBCUTANEOUS
Status: DISCONTINUED | OUTPATIENT
Start: 2025-02-28 | End: 2025-03-02

## 2025-02-28 RX ORDER — SODIUM CHLORIDE, SODIUM LACTATE, POTASSIUM CHLORIDE, CALCIUM CHLORIDE 600; 310; 30; 20 MG/100ML; MG/100ML; MG/100ML; MG/100ML
INJECTION, SOLUTION INTRAVENOUS CONTINUOUS
Status: DISCONTINUED | OUTPATIENT
Start: 2025-02-28 | End: 2025-03-02

## 2025-02-28 RX ORDER — KETOROLAC TROMETHAMINE 15 MG/ML
15 INJECTION, SOLUTION INTRAMUSCULAR; INTRAVENOUS
Status: COMPLETED | OUTPATIENT
Start: 2025-02-28 | End: 2025-03-02

## 2025-02-28 RX ORDER — ACETAMINOPHEN 325 MG/1
650 TABLET ORAL EVERY 4 HOURS PRN
Status: DISCONTINUED | OUTPATIENT
Start: 2025-02-28 | End: 2025-03-02

## 2025-02-28 RX ORDER — OXYTOCIN 10 [USP'U]/ML
10 INJECTION, SOLUTION INTRAMUSCULAR; INTRAVENOUS
Status: DISCONTINUED | OUTPATIENT
Start: 2025-02-28 | End: 2025-03-02

## 2025-02-28 RX ORDER — SODIUM CHLORIDE, SODIUM LACTATE, POTASSIUM CHLORIDE, CALCIUM CHLORIDE 600; 310; 30; 20 MG/100ML; MG/100ML; MG/100ML; MG/100ML
10-125 INJECTION, SOLUTION INTRAVENOUS CONTINUOUS
Status: DISCONTINUED | OUTPATIENT
Start: 2025-02-28 | End: 2025-03-02

## 2025-02-28 RX ORDER — NALOXONE HYDROCHLORIDE 0.4 MG/ML
0.4 INJECTION, SOLUTION INTRAMUSCULAR; INTRAVENOUS; SUBCUTANEOUS
Status: DISCONTINUED | OUTPATIENT
Start: 2025-02-28 | End: 2025-03-02

## 2025-02-28 RX ORDER — METOCLOPRAMIDE HYDROCHLORIDE 5 MG/ML
10 INJECTION INTRAMUSCULAR; INTRAVENOUS EVERY 6 HOURS PRN
Status: DISCONTINUED | OUTPATIENT
Start: 2025-02-28 | End: 2025-03-02

## 2025-02-28 RX ORDER — MAGNESIUM SULFATE IN WATER 40 MG/ML
1 INJECTION, SOLUTION INTRAVENOUS CONTINUOUS
Status: DISCONTINUED | OUTPATIENT
Start: 2025-02-28 | End: 2025-03-02

## 2025-02-28 RX ORDER — CARBOPROST TROMETHAMINE 250 UG/ML
250 INJECTION, SOLUTION INTRAMUSCULAR
Status: DISCONTINUED | OUTPATIENT
Start: 2025-02-28 | End: 2025-03-02

## 2025-02-28 RX ORDER — LOPERAMIDE HYDROCHLORIDE 2 MG/1
2 CAPSULE ORAL
Status: DISCONTINUED | OUTPATIENT
Start: 2025-02-28 | End: 2025-03-02

## 2025-02-28 RX ORDER — MISOPROSTOL 200 UG/1
400 TABLET ORAL
Status: DISCONTINUED | OUTPATIENT
Start: 2025-02-28 | End: 2025-03-02

## 2025-02-28 RX ORDER — ONDANSETRON 4 MG/1
4 TABLET, ORALLY DISINTEGRATING ORAL EVERY 6 HOURS PRN
Status: DISCONTINUED | OUTPATIENT
Start: 2025-02-28 | End: 2025-03-02

## 2025-02-28 RX ORDER — OXYTOCIN/0.9 % SODIUM CHLORIDE 30/500 ML
340 PLASTIC BAG, INJECTION (ML) INTRAVENOUS CONTINUOUS PRN
Status: DISCONTINUED | OUTPATIENT
Start: 2025-02-28 | End: 2025-03-02

## 2025-02-28 RX ORDER — LIDOCAINE 40 MG/G
CREAM TOPICAL
Status: DISCONTINUED | OUTPATIENT
Start: 2025-02-28 | End: 2025-03-02

## 2025-02-28 RX ORDER — DEXTROSE MONOHYDRATE 25 G/50ML
25-50 INJECTION, SOLUTION INTRAVENOUS
Status: DISCONTINUED | OUTPATIENT
Start: 2025-02-28 | End: 2025-03-01

## 2025-02-28 RX ORDER — MISOPROSTOL 100 UG/1
25 TABLET ORAL
Status: DISCONTINUED | OUTPATIENT
Start: 2025-02-28 | End: 2025-03-01

## 2025-02-28 RX ORDER — NICOTINE POLACRILEX 4 MG
15-30 LOZENGE BUCCAL
Status: DISCONTINUED | OUTPATIENT
Start: 2025-02-28 | End: 2025-03-01

## 2025-02-28 RX ORDER — ONDANSETRON 2 MG/ML
4 INJECTION INTRAMUSCULAR; INTRAVENOUS EVERY 6 HOURS PRN
Status: DISCONTINUED | OUTPATIENT
Start: 2025-02-28 | End: 2025-03-02

## 2025-02-28 RX ORDER — MAGNESIUM SULFATE HEPTAHYDRATE 40 MG/ML
2 INJECTION, SOLUTION INTRAVENOUS
Status: DISCONTINUED | OUTPATIENT
Start: 2025-02-28 | End: 2025-03-02

## 2025-02-28 RX ORDER — MAGNESIUM SULFATE HEPTAHYDRATE 40 MG/ML
4 INJECTION, SOLUTION INTRAVENOUS ONCE
Status: COMPLETED | OUTPATIENT
Start: 2025-02-28 | End: 2025-02-28

## 2025-02-28 RX ORDER — CITRIC ACID/SODIUM CITRATE 334-500MG
30 SOLUTION, ORAL ORAL
Status: DISCONTINUED | OUTPATIENT
Start: 2025-02-28 | End: 2025-03-02

## 2025-02-28 RX ORDER — FENTANYL CITRATE 50 UG/ML
100 INJECTION, SOLUTION INTRAMUSCULAR; INTRAVENOUS
Status: DISCONTINUED | OUTPATIENT
Start: 2025-02-28 | End: 2025-03-02

## 2025-02-28 RX ORDER — METOCLOPRAMIDE 10 MG/1
10 TABLET ORAL EVERY 6 HOURS PRN
Status: DISCONTINUED | OUTPATIENT
Start: 2025-02-28 | End: 2025-03-02

## 2025-02-28 RX ORDER — TRANEXAMIC ACID 10 MG/ML
1 INJECTION, SOLUTION INTRAVENOUS EVERY 30 MIN PRN
Status: DISCONTINUED | OUTPATIENT
Start: 2025-02-28 | End: 2025-03-02

## 2025-02-28 RX ORDER — METHYLERGONOVINE MALEATE 0.2 MG/ML
200 INJECTION INTRAVENOUS
Status: DISCONTINUED | OUTPATIENT
Start: 2025-02-28 | End: 2025-03-02

## 2025-02-28 RX ORDER — OXYTOCIN/0.9 % SODIUM CHLORIDE 30/500 ML
100-340 PLASTIC BAG, INJECTION (ML) INTRAVENOUS CONTINUOUS PRN
Status: DISCONTINUED | OUTPATIENT
Start: 2025-02-28 | End: 2025-03-02

## 2025-02-28 RX ORDER — PROCHLORPERAZINE MALEATE 10 MG
10 TABLET ORAL EVERY 6 HOURS PRN
Status: DISCONTINUED | OUTPATIENT
Start: 2025-02-28 | End: 2025-03-02

## 2025-02-28 RX ORDER — NIFEDIPINE 30 MG/1
30 TABLET, EXTENDED RELEASE ORAL DAILY
Status: DISCONTINUED | OUTPATIENT
Start: 2025-03-01 | End: 2025-03-02

## 2025-02-28 RX ORDER — HYDRALAZINE HYDROCHLORIDE 20 MG/ML
10 INJECTION INTRAMUSCULAR; INTRAVENOUS
Status: DISCONTINUED | OUTPATIENT
Start: 2025-02-28 | End: 2025-03-02

## 2025-02-28 RX ORDER — MAGNESIUM SULFATE HEPTAHYDRATE 40 MG/ML
4 INJECTION, SOLUTION INTRAVENOUS
Status: DISCONTINUED | OUTPATIENT
Start: 2025-02-28 | End: 2025-03-02

## 2025-02-28 RX ORDER — MISOPROSTOL 200 UG/1
800 TABLET ORAL
Status: DISCONTINUED | OUTPATIENT
Start: 2025-02-28 | End: 2025-03-02

## 2025-02-28 RX ORDER — NALBUPHINE HYDROCHLORIDE 10 MG/ML
2.5-5 INJECTION INTRAMUSCULAR; INTRAVENOUS; SUBCUTANEOUS EVERY 6 HOURS PRN
Status: DISCONTINUED | OUTPATIENT
Start: 2025-02-28 | End: 2025-03-02

## 2025-02-28 RX ORDER — LOPERAMIDE HYDROCHLORIDE 2 MG/1
4 CAPSULE ORAL
Status: DISCONTINUED | OUTPATIENT
Start: 2025-02-28 | End: 2025-03-02

## 2025-02-28 RX ORDER — LABETALOL HYDROCHLORIDE 5 MG/ML
20-80 INJECTION, SOLUTION INTRAVENOUS EVERY 10 MIN PRN
Status: DISCONTINUED | OUTPATIENT
Start: 2025-02-28 | End: 2025-03-02

## 2025-02-28 RX ADMIN — MAGNESIUM SULFATE HEPTAHYDRATE 4 G: 40 INJECTION, SOLUTION INTRAVENOUS at 18:37

## 2025-02-28 RX ADMIN — MAGNESIUM SULFATE HEPTAHYDRATE 2 G/HR: 40 INJECTION, SOLUTION INTRAVENOUS at 19:07

## 2025-02-28 RX ADMIN — LABETALOL HYDROCHLORIDE 20 MG: 5 INJECTION, SOLUTION INTRAVENOUS at 17:08

## 2025-02-28 RX ADMIN — MISOPROSTOL 25 MCG: 100 TABLET ORAL at 23:59

## 2025-02-28 RX ADMIN — MISOPROSTOL 25 MCG: 100 TABLET ORAL at 19:52

## 2025-02-28 RX ADMIN — MISOPROSTOL 25 MCG: 100 TABLET ORAL at 17:26

## 2025-02-28 RX ADMIN — MISOPROSTOL 25 MCG: 100 TABLET ORAL at 21:53

## 2025-02-28 RX ADMIN — MISOPROSTOL 25 MCG: 100 TABLET ORAL at 15:20

## 2025-02-28 RX ADMIN — SODIUM CHLORIDE, POTASSIUM CHLORIDE, SODIUM LACTATE AND CALCIUM CHLORIDE 75 ML/HR: 600; 310; 30; 20 INJECTION, SOLUTION INTRAVENOUS at 18:30

## 2025-02-28 ASSESSMENT — ACTIVITIES OF DAILY LIVING (ADL)
ADLS_ACUITY_SCORE: 15
ADLS_ACUITY_SCORE: 41
ADLS_ACUITY_SCORE: 15
ADLS_ACUITY_SCORE: 41
ADLS_ACUITY_SCORE: 41
ADLS_ACUITY_SCORE: 15
ADLS_ACUITY_SCORE: 15

## 2025-02-28 NOTE — PROVIDER NOTIFICATION
02/28/25 1738   Provider Notification   Provider Name/Title Dr. Madan Pierson   Method of Notification In Department   Request Evaluate - Remote   Notification Reason Status Update;Maternal Vital Sign Change     MD updated. Patient BPs severe range x2, required treatment with 20 mg Labetalol, BP responded to treatment. Patient continues to deny symptoms of pre-eclampsia. MD placing orders for magnesium sulfate load and continuous infusion.

## 2025-02-28 NOTE — PROVIDER NOTIFICATION
02/28/25 1417   Provider Notification   Provider Name/Title Dr. Madan Pierson   Method of Notification Phone   Request Evaluate - Remote   Notification Reason Patient Arrived;Maternal Vital Sign Change     MD updated on patient arrival and vitals, BPs consistently mild range with one severe range BP. Patient denies symptoms of pre-eclampsia, states she is feeling well. Reviewed lab results, PCR is still pending. Plan to admit for induction, TORB for intrapartum orders, cervical ripening with PO Cytotec, and early labor diabetes management orders (GDM diet controlled). If treatment is needed for severe range BPs will update MD and follow Labetalol treatment algorithm. FHR did have intermittent non-significant variable decels when FHR monitors applied, now moderate with accels, no decels present. May occasionally, patient is not feeling. Patient was checked in clinic today, 1/30/-5. Cervical exam not indicated prior to first dose of PO Cytotec. Check SVE after 2 doses and update MD.

## 2025-02-28 NOTE — PLAN OF CARE
Data: [facility] presented to [facility]: 2025  1:01 PM.  Reason for maternal/fetal assessment is elevated blood pressures. Patient was seen in clinic today where her BPs were mild range.  Patient is a .  Prenatal record reviewed. Pregnancy  has been complicated by gestational diabetes, diet controlled and chronic hypertension.  Gestational Age 37w4d. VSS. Fetal movement present. Patient denies uterine contractions, leaking of vaginal fluid/rupture of membranes, vaginal bleeding, abdominal pain, pelvic pressure, nausea, vomiting, headache, visual disturbances, epigastric or RUQ pain, significant edema. Support person is not present.   Action: Verbal consent for EFM. Triage assessment completed. Bill of rights reviewed.  Response: Patient verbalized agreement with plan. Will contact Dr Apolonia Pierson with update and further orders.

## 2025-02-28 NOTE — H&P
H&P Update 2025     No significant change in general health status based on examination of the patient, review of Nursing Admission Database and prenatal record.    39 year old  at 37w4d presented with mild and occasional severe range BP at clinic today.  Then was at M where she had an 8/10 BPP, 2 points off for movement.   EFW AGA 71%ile with AC 96%ile.  GDMA1  cHTN on nifedipine 30XL with plan to delivery in the 37 week.    After discussion of BPs with patient, she is amenable to staying for IOL due to cHTN not controlled on meds.  Continue nifedipine 30 XL, use IV labetalol protocol if spiking severe BP.  Cvx closed/, plan for cytotec for cervical ripening.   Planning epidural for active labor.     Admit for IOL    Apolonia Pierson MD, MPH  [facility]

## 2025-02-28 NOTE — PROVIDER NOTIFICATION
02/28/25 1605   Provider Notification   Provider Name/Title Dr. Madan Pierson   Method of Notification At Bedside     MD at bedside to discuss plan of care.

## 2025-03-01 LAB
GLUCOSE BLDC GLUCOMTR-MCNC: 100 MG/DL (ref 70–99)
GLUCOSE BLDC GLUCOMTR-MCNC: 104 MG/DL (ref 70–99)
GLUCOSE BLDC GLUCOMTR-MCNC: 106 MG/DL (ref 70–99)
GLUCOSE BLDC GLUCOMTR-MCNC: 138 MG/DL (ref 70–99)
GLUCOSE BLDC GLUCOMTR-MCNC: 156 MG/DL (ref 70–99)
GLUCOSE BLDC GLUCOMTR-MCNC: 99 MG/DL (ref 70–99)
GLUCOSE BLDC GLUCOMTR-MCNC: 99 MG/DL (ref 70–99)
MAGNESIUM SERPL-MCNC: 7 MG/DL (ref 1.7–2.3)
T PALLIDUM AB SER QL: NONREACTIVE

## 2025-03-01 PROCEDURE — 3E0R3BZ INTRODUCTION OF ANESTHETIC AGENT INTO SPINAL CANAL, PERCUTANEOUS APPROACH: ICD-10-PCS | Performed by: ANESTHESIOLOGY

## 2025-03-01 PROCEDURE — 250N000011 HC RX IP 250 OP 636: Performed by: OBSTETRICS & GYNECOLOGY

## 2025-03-01 PROCEDURE — 250N000011 HC RX IP 250 OP 636: Mod: JZ | Performed by: ANESTHESIOLOGY

## 2025-03-01 PROCEDURE — 83735 ASSAY OF MAGNESIUM: CPT | Performed by: OBSTETRICS & GYNECOLOGY

## 2025-03-01 PROCEDURE — P9045 ALBUMIN (HUMAN), 5%, 250 ML: HCPCS | Performed by: ANESTHESIOLOGY

## 2025-03-01 PROCEDURE — 258N000003 HC RX IP 258 OP 636: Performed by: OBSTETRICS & GYNECOLOGY

## 2025-03-01 PROCEDURE — 250N000009 HC RX 250: Performed by: ANESTHESIOLOGY

## 2025-03-01 PROCEDURE — 250N000013 HC RX MED GY IP 250 OP 250 PS 637: Performed by: OBSTETRICS & GYNECOLOGY

## 2025-03-01 PROCEDURE — 250N000011 HC RX IP 250 OP 636: Performed by: ANESTHESIOLOGY

## 2025-03-01 PROCEDURE — 36415 COLL VENOUS BLD VENIPUNCTURE: CPT | Performed by: OBSTETRICS & GYNECOLOGY

## 2025-03-01 PROCEDURE — 250N000009 HC RX 250: Performed by: OBSTETRICS & GYNECOLOGY

## 2025-03-01 PROCEDURE — 00HU33Z INSERTION OF INFUSION DEVICE INTO SPINAL CANAL, PERCUTANEOUS APPROACH: ICD-10-PCS | Performed by: ANESTHESIOLOGY

## 2025-03-01 PROCEDURE — 120N000001 HC R&B MED SURG/OB

## 2025-03-01 PROCEDURE — 258N000003 HC RX IP 258 OP 636: Performed by: ANESTHESIOLOGY

## 2025-03-01 RX ORDER — DEXTROSE MONOHYDRATE 100 MG/ML
INJECTION, SOLUTION INTRAVENOUS CONTINUOUS PRN
Status: DISCONTINUED | OUTPATIENT
Start: 2025-03-01 | End: 2025-03-02

## 2025-03-01 RX ORDER — SODIUM CHLORIDE 9 MG/ML
INJECTION, SOLUTION INTRAVENOUS CONTINUOUS PRN
Status: DISCONTINUED | OUTPATIENT
Start: 2025-03-01 | End: 2025-03-02

## 2025-03-01 RX ORDER — EPHEDRINE SULFATE 50 MG/ML
5 INJECTION, SOLUTION INTRAMUSCULAR; INTRAVENOUS; SUBCUTANEOUS
Status: DISCONTINUED | OUTPATIENT
Start: 2025-03-01 | End: 2025-03-02

## 2025-03-01 RX ORDER — NICOTINE POLACRILEX 4 MG
15-30 LOZENGE BUCCAL
Status: DISCONTINUED | OUTPATIENT
Start: 2025-03-01 | End: 2025-03-02

## 2025-03-01 RX ORDER — DEXTROSE MONOHYDRATE 25 G/50ML
25-50 INJECTION, SOLUTION INTRAVENOUS
Status: DISCONTINUED | OUTPATIENT
Start: 2025-03-01 | End: 2025-03-02

## 2025-03-01 RX ORDER — BUPIVACAINE HYDROCHLORIDE 2.5 MG/ML
INJECTION, SOLUTION EPIDURAL; INFILTRATION; INTRACAUDAL; PERINEURAL
Status: COMPLETED | OUTPATIENT
Start: 2025-03-01 | End: 2025-03-01

## 2025-03-01 RX ORDER — SODIUM CHLORIDE, SODIUM LACTATE, POTASSIUM CHLORIDE, CALCIUM CHLORIDE 600; 310; 30; 20 MG/100ML; MG/100ML; MG/100ML; MG/100ML
INJECTION, SOLUTION INTRAVENOUS CONTINUOUS PRN
Status: DISCONTINUED | OUTPATIENT
Start: 2025-03-01 | End: 2025-03-02

## 2025-03-01 RX ORDER — LIDOCAINE 40 MG/G
CREAM TOPICAL
Status: DISCONTINUED | OUTPATIENT
Start: 2025-03-01 | End: 2025-03-02

## 2025-03-01 RX ORDER — FENTANYL CITRATE-0.9 % NACL/PF 10 MCG/ML
100 PLASTIC BAG, INJECTION (ML) INTRAVENOUS EVERY 5 MIN PRN
Status: DISCONTINUED | OUTPATIENT
Start: 2025-03-01 | End: 2025-03-02

## 2025-03-01 RX ORDER — OXYTOCIN/0.9 % SODIUM CHLORIDE 30/500 ML
1-24 PLASTIC BAG, INJECTION (ML) INTRAVENOUS CONTINUOUS
Status: DISCONTINUED | OUTPATIENT
Start: 2025-03-01 | End: 2025-03-02

## 2025-03-01 RX ORDER — EPHEDRINE SULFATE 50 MG/ML
10 INJECTION, SOLUTION INTRAVENOUS ONCE
Status: DISCONTINUED | OUTPATIENT
Start: 2025-03-01 | End: 2025-03-02

## 2025-03-01 RX ORDER — EPHEDRINE SULFATE 50 MG/ML
INJECTION, SOLUTION INTRAMUSCULAR; INTRAVENOUS; SUBCUTANEOUS
Status: COMPLETED
Start: 2025-03-01 | End: 2025-03-01

## 2025-03-01 RX ADMIN — Medication 100 MCG: at 15:30

## 2025-03-01 RX ADMIN — MISOPROSTOL 25 MCG: 100 TABLET ORAL at 06:38

## 2025-03-01 RX ADMIN — EPHEDRINE SULFATE 10 MG: 5 INJECTION INTRAVENOUS at 15:44

## 2025-03-01 RX ADMIN — PHENYLEPHRINE HYDROCHLORIDE 100 MCG: 10 INJECTION INTRAVENOUS at 16:25

## 2025-03-01 RX ADMIN — BUPIVACAINE HYDROCHLORIDE 10 ML: 2.5 INJECTION, SOLUTION EPIDURAL; INFILTRATION; INTRACAUDAL at 14:57

## 2025-03-01 RX ADMIN — PHENYLEPHRINE HYDROCHLORIDE 100 MCG: 10 INJECTION INTRAVENOUS at 16:20

## 2025-03-01 RX ADMIN — Medication 2 MILLI-UNITS/MIN: at 11:51

## 2025-03-01 RX ADMIN — SODIUM CHLORIDE, POTASSIUM CHLORIDE, SODIUM LACTATE AND CALCIUM CHLORIDE: 600; 310; 30; 20 INJECTION, SOLUTION INTRAVENOUS at 19:16

## 2025-03-01 RX ADMIN — PHENYLEPHRINE HYDROCHLORIDE 100 MCG: 10 INJECTION INTRAVENOUS at 16:23

## 2025-03-01 RX ADMIN — ALBUMIN HUMAN 12.5 G: 0.05 INJECTION, SOLUTION INTRAVENOUS at 16:16

## 2025-03-01 RX ADMIN — METOCLOPRAMIDE 10 MG: 5 INJECTION, SOLUTION INTRAMUSCULAR; INTRAVENOUS at 21:00

## 2025-03-01 RX ADMIN — Medication 100 MCG: at 15:12

## 2025-03-01 RX ADMIN — MISOPROSTOL 25 MCG: 100 TABLET ORAL at 09:13

## 2025-03-01 RX ADMIN — MISOPROSTOL 25 MCG: 100 TABLET ORAL at 04:37

## 2025-03-01 RX ADMIN — PHENYLEPHRINE HYDROCHLORIDE 200 MCG: 10 INJECTION INTRAVENOUS at 16:13

## 2025-03-01 RX ADMIN — NIFEDIPINE 30 MG: 30 TABLET, FILM COATED, EXTENDED RELEASE ORAL at 09:13

## 2025-03-01 RX ADMIN — Medication 100 MCG: at 15:17

## 2025-03-01 RX ADMIN — Medication: at 15:00

## 2025-03-01 RX ADMIN — SODIUM CHLORIDE, POTASSIUM CHLORIDE, SODIUM LACTATE AND CALCIUM CHLORIDE: 600; 310; 30; 20 INJECTION, SOLUTION INTRAVENOUS at 15:01

## 2025-03-01 RX ADMIN — LABETALOL HYDROCHLORIDE 20 MG: 5 INJECTION, SOLUTION INTRAVENOUS at 04:08

## 2025-03-01 RX ADMIN — MISOPROSTOL 25 MCG: 100 TABLET ORAL at 02:00

## 2025-03-01 RX ADMIN — EPHEDRINE SULFATE 5 MG: 5 INJECTION INTRAVENOUS at 16:04

## 2025-03-01 RX ADMIN — Medication 100 MCG: at 15:24

## 2025-03-01 RX ADMIN — EPHEDRINE SULFATE 5 MG: 5 INJECTION INTRAVENOUS at 15:55

## 2025-03-01 RX ADMIN — MAGNESIUM SULFATE HEPTAHYDRATE 1 G/HR: 40 INJECTION, SOLUTION INTRAVENOUS at 19:15

## 2025-03-01 RX ADMIN — SODIUM CHLORIDE, POTASSIUM CHLORIDE, SODIUM LACTATE AND CALCIUM CHLORIDE 75 ML/HR: 600; 310; 30; 20 INJECTION, SOLUTION INTRAVENOUS at 07:00

## 2025-03-01 ASSESSMENT — ACTIVITIES OF DAILY LIVING (ADL)
ADLS_ACUITY_SCORE: 15
ADLS_ACUITY_SCORE: 17
ADLS_ACUITY_SCORE: 15
ADLS_ACUITY_SCORE: 15
ADLS_ACUITY_SCORE: 17
ADLS_ACUITY_SCORE: 15
ADLS_ACUITY_SCORE: 17
ADLS_ACUITY_SCORE: 15
ADLS_ACUITY_SCORE: 17
ADLS_ACUITY_SCORE: 15
ADLS_ACUITY_SCORE: 15

## 2025-03-01 NOTE — PROVIDER NOTIFICATION
03/01/25 1520   Provider Notification   Provider Name/Title Dr. Causey   Method of Notification Electronic Page   Request Evaluate in Person   Notification Reason Maternal Vital Sign Change;Decels     MD updated. Treating hypotension with phenylephrine following epidural. FHR with minimal variability and recurrent late and prolonged decels. Requesting MD evaluate FHR tracing.   spouse

## 2025-03-01 NOTE — PROVIDER NOTIFICATION
03/01/25 1445   Provider Notification   Provider Name/Title Dr. Hassan   Method of Notification At Bedside   Request Evaluate in Person   Notification Reason Pain     MDA at bedside for epidural placement.

## 2025-03-01 NOTE — PROVIDER NOTIFICATION
03/01/25 1316   Provider Notification   Provider Name/Title Dr. Causey   Method of Notification At Bedside     MD at bedside. SVE unchanged, 3/70/-2. Patient agreeable to AROM, ruptured for clear fluid at 1319. Titrating pitocin, infusing at 6 brandy-units/min. Patient reports contractions continue to feel mild, feeling lower uterine tightening and coping well.

## 2025-03-01 NOTE — ANESTHESIA PROCEDURE NOTES
Epidural catheter Procedure Note    Pre-Procedure   Staff -        Anesthesiologist:  Lila Hassan MD       Performed By: anesthesiologist       Referred By: Marium       Location: OB       Pre-Anesthestic Checklist: patient identified, IV checked, risks and benefits discussed, informed consent, monitors and equipment checked, pre-op evaluation and at physician/surgeon's request  Timeout:       Correct Patient: Yes        Correct Procedure: Yes        Correct Site: Yes        Correct Position: Yes   Procedure Documentation  Procedure: epidural catheter         Diagnosis: Labor pain       Patient Position: sitting       Patient Prep/Sterile Barriers: sterile gloves, mask, patient draped       Skin prep: Chloraprep       Local skin infiltrated with 2 mL of 1% lidocaine.        Insertion Site: L3-4. (midline approach).       Technique: LORT saline        ISELA at 7 cm.       Needle Type: [employer] needle       Needle Gauge: 17.        Needle Length (Inches): 3.5        Catheter: 19 G.          Catheter threaded easily.         5 cm epidural space.         Threaded 12 cm at skin.         # of attempts: 1 and  # of redirects:  0    Assessment/Narrative         Paresthesias: No.       Test dose of 3 mL lidocaine 1.5% w/ 1:200,000 epinephrine at 14:54 CST.         Test dose negative, 3 minutes after injection, for signs of intravascular, subdural, or intrathecal injection.       Insertion/Infusion Method: LORT saline       Aspiration negative for Heme or CSF via Epidural Catheter.    Medication(s) Administered   0.25% Bupivacaine PF (Epidural) - EPIDURAL   10 mL - 3/1/2025 2:57:00 PM   Comments:  Procedure tolerated well without apparent complications. Repeat aspiration negative for CSF. Initial MDA bolus of 0.25% bupivacaine was incrementally given without difficulty or complications. No abrupt changes in sensation or hemodynamic instability.  Epidural infusion (0.125% bupi with fentanyl 2mcg/ml) started at 10 ml/hr  "with PCEA of 5 ml q15min with a max cumulative dose of 25 ml/hr. PCEA instructions given and use encouraged PRN. Epidural expectations again reviewed and questions answered. Patient hemodynamically stable.  Epidural functionality to be reassessed later via vital sign flowsheet, nursing communication, and/or patient report.  Anesthesiologist immediately available for ongoing assessment and management.             FOR [facility] [place] ONLY:   Pain Team Contact information: please page the Pain Team Via [employer]. Search \"Pain\". During daytime hours, please page the attending first. At night please page the resident first.      "

## 2025-03-01 NOTE — ANESTHESIA PREPROCEDURE EVALUATION
"Anesthesia Pre-Procedure Evaluation    Patient: Shila Batres   MRN: [id number] : 1985        Procedure :           Past Medical History:   Diagnosis Date    Benign essential hypertension     Gestational diabetes     2010    Obesity (BMI 30-39.9)       Past Surgical History:   Procedure Laterality Date    TYMPANOPLASTY Left       Allergies   Allergen Reactions    Nkda [No Known Drug Allergy]       Social History     Tobacco Use    Smoking status: Never    Smokeless tobacco: Never   Substance Use Topics    Alcohol use: Not Currently      Wt Readings from Last 1 Encounters:   25 82.4 kg (181 lb 11.2 oz)        Anesthesia Evaluation   Pt has had prior anesthetic. Type: OB Labor Epidural.    No history of anesthetic complications       ROS/MED HX  ENT/Pulmonary:  - neg pulmonary ROS     Neurologic:  - neg neurologic ROS     Cardiovascular:     (+)  - - PIH  -  - -                                      METS/Exercise Tolerance:     Hematologic:       Musculoskeletal:       GI/Hepatic:       Renal/Genitourinary:       Endo:     (+)               Obesity,       Psychiatric/Substance Use:       Infectious Disease:       Malignancy:       Other:    at 37+5 IOL for cHTN            OUTSIDE LABS:  CBC:   Lab Results   Component Value Date    WBC 9.4 2025    WBC 10.3 2025    HGB 11.3 (L) 2025    HGB 11.3 (L) 2025    HCT 33.2 (L) 2025    HCT 34.5 (L) 2025     2025     2025     BMP:   Lab Results   Component Value Date     2025     2025    POTASSIUM 4.3 2025    POTASSIUM 4.1 2025    CHLORIDE 105 2025    CHLORIDE 103 2025    CO2 20 (L) 2025    CO2 20 (L) 2025    BUN 7.1 2025    BUN 9.0 2025    CR 0.67 2025    CR 0.64 2025     (H) 2025     (H) 2025     COAGS: No results found for: \"PTT\", \"INR\", \"FIBR\"  POC:   Lab Results   Component Value " Date    BGM 74 03/14/2010    HCGS Positive (A) 09/02/2024     HEPATIC:   Lab Results   Component Value Date    ALBUMIN 3.2 (L) 02/28/2025    PROTTOTAL 6.3 (L) 02/28/2025    ALT 39 02/28/2025    AST 29 02/28/2025    ALKPHOS 360 (H) 02/28/2025    BILITOTAL 0.3 02/28/2025     OTHER:   Lab Results   Component Value Date    PH 6.0 08/25/2009    A1C 6.4 (H) 02/28/2025    BIRDIE 8.8 02/28/2025    MAG 7.0 (H) 03/01/2025       Anesthesia Plan    ASA Status:  2       Anesthesia Type: Epidural.              Consents    Anesthesia Plan(s) and associated risks, benefits, and realistic alternatives discussed. Questions answered and patient/representative(s) expressed understanding.     - Discussed:     - Discussed with:  Patient            Postoperative Care            Comments:    Other Comments: Continuous Labor Epidural: Indication is for labor pain. Following a discussion of the procedure, epidural expectations, and risks and benefits (risks including, but not limited to, nerve damage, infection, bleeding/hematoma, inadvertent dural puncture, spinal headache, partial or failed block possibly requiring epidural replacement), the patient appears to understand and consents to proceed. Questions were encouraged and answered prior to placement.              Lila Hassan MD    I have reviewed the pertinent notes and labs in the chart from the past 30 days and (re)examined the patient.  Any updates or changes from those notes are reflected in this note.    Clinically Significant Risk Factors               # Hypoalbuminemia: Lowest albumin = 3.2 g/dL at 2/28/2025  1:38 PM, will monitor as appropriate     # Hypertension: Noted on problem list

## 2025-03-01 NOTE — PROVIDER NOTIFICATION
03/01/25 1645   Provider Notification   Provider Name/Title Dr. Causey   Method of Notification At Bedside     MD at bedside. Reviewing FHR tracing. Variability has improved, late decels intermittent now. MD states Pitocin may be restarted at 1800 at a rate of 2 brandy-units/min if there are no significant decelerations, minimal variability may be present.

## 2025-03-01 NOTE — PROVIDER NOTIFICATION
03/01/25 0828   Provider Notification   Provider Name/Title Dr. Causey   Method of Notification Electronic Page   Request Evaluate - Remote   Notification Reason Other (Comment)  (Symptoms due to magnesium infusion)     MD updated. Patient now reporting increased dizziness, nausea, and increased thirst. RN would like to discuss plan of care.    0830 MD at bedside. Verbal orders to decrease magnesium sulfate continuous infusion to 1 g/hr, will also draw magnesium level. Plan of care discussed with patient, all questions answered.

## 2025-03-01 NOTE — PROVIDER NOTIFICATION
03/01/25 0750   Provider Notification   Provider Name/Title Dr. Causey   Method of Notification In Department     Plan of care discussed - patient has received 8 doses of PO Cytotec, patient reports mild lower uterine tightening, contractions palpate mild. Will continue PO Cytotec, recheck patient once she becomes uncomfortable. Reviewed BPs, BP last treated with IV labetalol today at 0408.

## 2025-03-01 NOTE — PROVIDER NOTIFICATION
03/01/25 1538   Provider Notification   Provider Name/Title Sonya   Method of Notification Phone   Notification Reason Maternal Vital Sign Change     MD notified of bps and max doses of phenylephrine, ordering 10mg ephedrine now

## 2025-03-01 NOTE — PROVIDER NOTIFICATION
03/01/25 1523   Provider Notification   Provider Name/Title Dr. Causey   Method of Notification At Bedside   Request Evaluate in Person   Notification Reason Maternal Vital Sign Change;Decels     MD at bedside to evaluate. SVE 5/90/-2. Patient not feeling pressure at this time. Comfortable with her epidural. Continuing to treat hypotension with Phenylephrine, IV fluid bolus, and position changes. MD reviewing FHR tracing at bedside.  FSE applied per MD at 1527.

## 2025-03-01 NOTE — PROVIDER NOTIFICATION
"   03/01/25 0420   Provider Notification   Provider Name/Title Dr. Madan Pierson   Method of Notification Electronic Page   Request Evaluate - Remote   Notification Reason Maternal Vital Sign Change     RN messaged MD after patient had two severe range pressures within an hour and needed to be treated. Patient was given 20 mg of labetalol IV per protocol. No clonus present. Patient reports a \"slight headache\"  "

## 2025-03-01 NOTE — PROVIDER NOTIFICATION
03/01/25 1611   Provider Notification   Provider Name/Title Dr. Hassan   Method of Notification At Bedside   Request Evaluate in Person   Notification Reason Maternal Vital Sign Change     MDA at bedside to evaluate hypotension. Epidural stopped at 1617 per Dr. Hassan. MDA remained at bedside until BPs stable and FHR tracing improved. Dermatomes at T3 level.     MDA placing orders for updated phenylephrine treatment parameters. Epidural to remain stopped until patient either is reporting discomfort with contractions or dermatome level drops to T10 or lower. RN may restart epidural.

## 2025-03-01 NOTE — PROVIDER NOTIFICATION
03/01/25 0626   Provider Notification   Provider Name/Title Dr. Madan Pierson   Method of Notification Electronic Page   Request Evaluate - Remote   Notification Reason SVE     MD updated that patients SVE was unchanged at 0600. Patient will get one more dose of PO cytotec by 0700. Patient will then have received 8 doses

## 2025-03-01 NOTE — PROVIDER NOTIFICATION
03/01/25 1123   Provider Notification   Provider Name/Title Dr. Causey   Method of Notification At Bedside     MD at bedside. SVE 3/70/-2. Verbal orders to start Pitocin 2x2 standard dosing. Patient does plan an epidural for labor pain relief, may receive when requesting. Patient agreeable to plan of care, all questions answered.

## 2025-03-01 NOTE — PROVIDER NOTIFICATION
"   03/01/25 0323   Provider Notification   Provider Name/Title Dr. Madan Pierson   Method of Notification In Department   Request Evaluate - Remote   Notification Reason Status Update     MD updated that patient feels \"uncomfortable\" with contractions that appear every 2-5 min. MD would like patient checked at 0400 and if lewis less than 4, continue with PO miso. MD would also like patient to be rechecked at 0600 if patient is unchanged after next check.   "

## 2025-03-01 NOTE — CARE PLAN
Patient's 1 hr post prandial blood sugar elevated at 152. Patient was provided hospital meal after kitchen closed. No correction needed for blood sugar level per order set.

## 2025-03-01 NOTE — PROVIDER NOTIFICATION
03/01/25 1030   Provider Notification   Provider Name/Title Dr. Causey   Method of Notification In Department   Request Evaluate - Remote   Notification Reason Lab/Diagnostic Study;Status Update     MD updated in department. Magnesium level was 7.0. Patient reports she is feeling much better after two hours of the magnesium sulfate infusion at 1 g/hr. Symptoms have all improved, continues to report dizziness but states the sensation is very mild now. Nausea and vomiting resolved.

## 2025-03-01 NOTE — PROVIDER NOTIFICATION
02/28/25 1942   Provider Notification   Provider Name/Title Dr. Madan Pierson   Method of Notification In Department   Request Evaluate - Remote   Notification Reason SVE     MD updated that patient was found to be 1/30/-4 after 2 doses of PO cytotec. Patient has now started her continuous dose. MD would like to continue with PO cytotec doses for cervical ripening. MD would like patient to be rechecked at 0600.

## 2025-03-01 NOTE — PROGRESS NOTES
OB PN    Patient comfortable    Cervix checked.  3/70/-2    Will begin pitocin and AROM when regular ctx established    Monitor progress    Julito Causey MD

## 2025-03-01 NOTE — PROGRESS NOTES
Pt feeling well, ambulating independently with her mag, does not feel symptomatic at all now.     Hospital dinner provided.     BP changed to hourly.     T Cat I.

## 2025-03-02 LAB
GLUCOSE BLDC GLUCOMTR-MCNC: 119 MG/DL (ref 70–99)
HGB BLD-MCNC: 9.4 G/DL (ref 11.7–15.7)

## 2025-03-02 PROCEDURE — 88307 TISSUE EXAM BY PATHOLOGIST: CPT | Mod: 26 | Performed by: PATHOLOGY

## 2025-03-02 PROCEDURE — 88307 TISSUE EXAM BY PATHOLOGIST: CPT | Mod: TC | Performed by: OBSTETRICS & GYNECOLOGY

## 2025-03-02 PROCEDURE — 999N000016 HC STATISTIC ATTENDANCE AT DELIVERY

## 2025-03-02 PROCEDURE — 250N000013 HC RX MED GY IP 250 OP 250 PS 637: Performed by: OBSTETRICS & GYNECOLOGY

## 2025-03-02 PROCEDURE — 722N000001 HC LABOR CARE VAGINAL DELIVERY SINGLE

## 2025-03-02 PROCEDURE — 250N000011 HC RX IP 250 OP 636: Performed by: OBSTETRICS & GYNECOLOGY

## 2025-03-02 PROCEDURE — 36415 COLL VENOUS BLD VENIPUNCTURE: CPT | Performed by: OBSTETRICS & GYNECOLOGY

## 2025-03-02 PROCEDURE — 59400 OBSTETRICAL CARE: CPT | Performed by: OBSTETRICS & GYNECOLOGY

## 2025-03-02 PROCEDURE — 85018 HEMOGLOBIN: CPT | Performed by: OBSTETRICS & GYNECOLOGY

## 2025-03-02 PROCEDURE — 120N000001 HC R&B MED SURG/OB

## 2025-03-02 PROCEDURE — 10907ZC DRAINAGE OF AMNIOTIC FLUID, THERAPEUTIC FROM PRODUCTS OF CONCEPTION, VIA NATURAL OR ARTIFICIAL OPENING: ICD-10-PCS | Performed by: OBSTETRICS & GYNECOLOGY

## 2025-03-02 PROCEDURE — 258N000003 HC RX IP 258 OP 636: Performed by: OBSTETRICS & GYNECOLOGY

## 2025-03-02 RX ORDER — HYDROCORTISONE 25 MG/G
CREAM TOPICAL 3 TIMES DAILY PRN
Status: DISCONTINUED | OUTPATIENT
Start: 2025-03-02 | End: 2025-03-04 | Stop reason: HOSPADM

## 2025-03-02 RX ORDER — MAGNESIUM SULFATE HEPTAHYDRATE 40 MG/ML
4 INJECTION, SOLUTION INTRAVENOUS
Status: DISCONTINUED | OUTPATIENT
Start: 2025-03-02 | End: 2025-03-02

## 2025-03-02 RX ORDER — OXYTOCIN/0.9 % SODIUM CHLORIDE 30/500 ML
340 PLASTIC BAG, INJECTION (ML) INTRAVENOUS CONTINUOUS PRN
Status: DISCONTINUED | OUTPATIENT
Start: 2025-03-02 | End: 2025-03-04 | Stop reason: HOSPADM

## 2025-03-02 RX ORDER — MAGNESIUM SULFATE IN WATER 40 MG/ML
1 INJECTION, SOLUTION INTRAVENOUS CONTINUOUS
Status: DISCONTINUED | OUTPATIENT
Start: 2025-03-02 | End: 2025-03-02

## 2025-03-02 RX ORDER — OXYTOCIN 10 [USP'U]/ML
10 INJECTION, SOLUTION INTRAMUSCULAR; INTRAVENOUS
Status: DISCONTINUED | OUTPATIENT
Start: 2025-03-02 | End: 2025-03-04 | Stop reason: HOSPADM

## 2025-03-02 RX ORDER — BISACODYL 10 MG
10 SUPPOSITORY, RECTAL RECTAL DAILY PRN
Status: DISCONTINUED | OUTPATIENT
Start: 2025-03-02 | End: 2025-03-04 | Stop reason: HOSPADM

## 2025-03-02 RX ORDER — METHYLERGONOVINE MALEATE 0.2 MG/ML
200 INJECTION INTRAVENOUS
Status: DISCONTINUED | OUTPATIENT
Start: 2025-03-02 | End: 2025-03-04 | Stop reason: HOSPADM

## 2025-03-02 RX ORDER — NIFEDIPINE 30 MG/1
30 TABLET, EXTENDED RELEASE ORAL 2 TIMES DAILY
Status: DISCONTINUED | OUTPATIENT
Start: 2025-03-02 | End: 2025-03-03

## 2025-03-02 RX ORDER — HYDRALAZINE HYDROCHLORIDE 20 MG/ML
10 INJECTION INTRAMUSCULAR; INTRAVENOUS
Status: DISCONTINUED | OUTPATIENT
Start: 2025-03-02 | End: 2025-03-04 | Stop reason: HOSPADM

## 2025-03-02 RX ORDER — LOPERAMIDE HYDROCHLORIDE 2 MG/1
4 CAPSULE ORAL
Status: DISCONTINUED | OUTPATIENT
Start: 2025-03-02 | End: 2025-03-04 | Stop reason: HOSPADM

## 2025-03-02 RX ORDER — MISOPROSTOL 200 UG/1
400 TABLET ORAL
Status: DISCONTINUED | OUTPATIENT
Start: 2025-03-02 | End: 2025-03-04 | Stop reason: HOSPADM

## 2025-03-02 RX ORDER — TRANEXAMIC ACID 10 MG/ML
1 INJECTION, SOLUTION INTRAVENOUS EVERY 30 MIN PRN
Status: DISCONTINUED | OUTPATIENT
Start: 2025-03-02 | End: 2025-03-04 | Stop reason: HOSPADM

## 2025-03-02 RX ORDER — ACETAMINOPHEN 325 MG/1
650 TABLET ORAL EVERY 4 HOURS PRN
Status: DISCONTINUED | OUTPATIENT
Start: 2025-03-02 | End: 2025-03-04 | Stop reason: HOSPADM

## 2025-03-02 RX ORDER — POLYETHYLENE GLYCOL 3350 17 G/17G
17 POWDER, FOR SOLUTION ORAL DAILY PRN
Status: DISCONTINUED | OUTPATIENT
Start: 2025-03-02 | End: 2025-03-04 | Stop reason: HOSPADM

## 2025-03-02 RX ORDER — AMOXICILLIN 250 MG
2 CAPSULE ORAL
Status: DISCONTINUED | OUTPATIENT
Start: 2025-03-02 | End: 2025-03-04 | Stop reason: HOSPADM

## 2025-03-02 RX ORDER — PRENATAL VIT/IRON FUM/FOLIC AC 27MG-0.8MG
1 TABLET ORAL DAILY
Status: DISCONTINUED | OUTPATIENT
Start: 2025-03-02 | End: 2025-03-04 | Stop reason: HOSPADM

## 2025-03-02 RX ORDER — MISOPROSTOL 200 UG/1
800 TABLET ORAL
Status: DISCONTINUED | OUTPATIENT
Start: 2025-03-02 | End: 2025-03-04 | Stop reason: HOSPADM

## 2025-03-02 RX ORDER — LABETALOL HYDROCHLORIDE 5 MG/ML
INJECTION, SOLUTION INTRAVENOUS
Status: COMPLETED
Start: 2025-03-02 | End: 2025-03-02

## 2025-03-02 RX ORDER — CARBOPROST TROMETHAMINE 250 UG/ML
250 INJECTION, SOLUTION INTRAMUSCULAR
Status: DISCONTINUED | OUTPATIENT
Start: 2025-03-02 | End: 2025-03-04 | Stop reason: HOSPADM

## 2025-03-02 RX ORDER — CALCIUM GLUCONATE 94 MG/ML
1 INJECTION, SOLUTION INTRAVENOUS
Status: DISCONTINUED | OUTPATIENT
Start: 2025-03-02 | End: 2025-03-04 | Stop reason: HOSPADM

## 2025-03-02 RX ORDER — LIDOCAINE 40 MG/G
CREAM TOPICAL
Status: DISCONTINUED | OUTPATIENT
Start: 2025-03-02 | End: 2025-03-04 | Stop reason: HOSPADM

## 2025-03-02 RX ORDER — LOPERAMIDE HYDROCHLORIDE 2 MG/1
2 CAPSULE ORAL
Status: DISCONTINUED | OUTPATIENT
Start: 2025-03-02 | End: 2025-03-04 | Stop reason: HOSPADM

## 2025-03-02 RX ORDER — MAGNESIUM SULFATE HEPTAHYDRATE 40 MG/ML
2 INJECTION, SOLUTION INTRAVENOUS
Status: DISCONTINUED | OUTPATIENT
Start: 2025-03-02 | End: 2025-03-02

## 2025-03-02 RX ORDER — IBUPROFEN 800 MG/1
800 TABLET, FILM COATED ORAL EVERY 6 HOURS PRN
Status: DISCONTINUED | OUTPATIENT
Start: 2025-03-02 | End: 2025-03-04 | Stop reason: HOSPADM

## 2025-03-02 RX ORDER — SODIUM CHLORIDE, SODIUM LACTATE, POTASSIUM CHLORIDE, CALCIUM CHLORIDE 600; 310; 30; 20 MG/100ML; MG/100ML; MG/100ML; MG/100ML
10-125 INJECTION, SOLUTION INTRAVENOUS CONTINUOUS
Status: DISCONTINUED | OUTPATIENT
Start: 2025-03-02 | End: 2025-03-03

## 2025-03-02 RX ORDER — NIFEDIPINE 30 MG/1
30 TABLET, EXTENDED RELEASE ORAL DAILY
Status: DISCONTINUED | OUTPATIENT
Start: 2025-03-02 | End: 2025-03-02

## 2025-03-02 RX ORDER — MAGNESIUM SULFATE IN WATER 40 MG/ML
1 INJECTION, SOLUTION INTRAVENOUS CONTINUOUS
Status: DISCONTINUED | OUTPATIENT
Start: 2025-03-02 | End: 2025-03-03

## 2025-03-02 RX ORDER — LABETALOL HYDROCHLORIDE 5 MG/ML
20-80 INJECTION, SOLUTION INTRAVENOUS EVERY 10 MIN PRN
Status: DISCONTINUED | OUTPATIENT
Start: 2025-03-02 | End: 2025-03-04 | Stop reason: HOSPADM

## 2025-03-02 RX ADMIN — NIFEDIPINE 30 MG: 30 TABLET, FILM COATED, EXTENDED RELEASE ORAL at 21:35

## 2025-03-02 RX ADMIN — LABETALOL HYDROCHLORIDE 20 MG: 5 INJECTION, SOLUTION INTRAVENOUS at 04:18

## 2025-03-02 RX ADMIN — IBUPROFEN 800 MG: 800 TABLET, FILM COATED ORAL at 09:23

## 2025-03-02 RX ADMIN — MAGNESIUM SULFATE HEPTAHYDRATE 1 G/HR: 40 INJECTION, SOLUTION INTRAVENOUS at 05:52

## 2025-03-02 RX ADMIN — KETOROLAC TROMETHAMINE 15 MG: 15 INJECTION, SOLUTION INTRAMUSCULAR; INTRAVENOUS at 02:21

## 2025-03-02 RX ADMIN — SODIUM CHLORIDE, POTASSIUM CHLORIDE, SODIUM LACTATE AND CALCIUM CHLORIDE 100 ML/HR: 600; 310; 30; 20 INJECTION, SOLUTION INTRAVENOUS at 05:53

## 2025-03-02 RX ADMIN — ACETAMINOPHEN 650 MG: 325 TABLET, FILM COATED ORAL at 13:36

## 2025-03-02 RX ADMIN — PSYLLIUM HUSK 1 PACKET: 3.4 POWDER ORAL at 09:22

## 2025-03-02 RX ADMIN — LABETALOL HYDROCHLORIDE 20 MG: 5 INJECTION, SOLUTION INTRAVENOUS at 03:29

## 2025-03-02 RX ADMIN — NIFEDIPINE 30 MG: 30 TABLET, FILM COATED, EXTENDED RELEASE ORAL at 09:22

## 2025-03-02 RX ADMIN — SODIUM CHLORIDE, POTASSIUM CHLORIDE, SODIUM LACTATE AND CALCIUM CHLORIDE 100 ML/HR: 600; 310; 30; 20 INJECTION, SOLUTION INTRAVENOUS at 15:07

## 2025-03-02 RX ADMIN — MISOPROSTOL 800 MCG: 200 TABLET ORAL at 02:20

## 2025-03-02 RX ADMIN — PRENATAL VITAMINS-IRON FUMARATE 27 MG IRON-FOLIC ACID 0.8 MG TABLET 1 TABLET: at 09:22

## 2025-03-02 ASSESSMENT — ACTIVITIES OF DAILY LIVING (ADL)
ADLS_ACUITY_SCORE: 15
ADLS_ACUITY_SCORE: 15
ADLS_ACUITY_SCORE: 16
ADLS_ACUITY_SCORE: 15
ADLS_ACUITY_SCORE: 15
ADLS_ACUITY_SCORE: 17
ADLS_ACUITY_SCORE: 15
ADLS_ACUITY_SCORE: 16
ADLS_ACUITY_SCORE: 15
ADLS_ACUITY_SCORE: 17
ADLS_ACUITY_SCORE: 15
ADLS_ACUITY_SCORE: 15
ADLS_ACUITY_SCORE: 17
ADLS_ACUITY_SCORE: 15
ADLS_ACUITY_SCORE: 16
ADLS_ACUITY_SCORE: 15
ADLS_ACUITY_SCORE: 17
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
## 2025-03-02 NOTE — PROVIDER NOTIFICATION
03/01/25 1805   Provider Notification   Provider Name/Title Dr. Causey   Method of Notification In Department     MD updated. SVE unchanged, 5/90/-2. Pitocin restarted at 2 brandy-units/min. Epidural restarted per telephone orders from [facility].

## 2025-03-02 NOTE — LACTATION NOTE
This note was copied from a baby's chart.  Lactation visit; Shila has visitors in room- states she has no questions/concerns for lactation. Both breast and formula feeding. Infant on hypoglycemic protocol d/t maternal GDM. Shila also on magnesium sulfate for Pre-E. Family currently formula feeding- brief education provided on importance of breast stimulation to help establish milk supply- states she would like to pump.  Writer brought pump to bedside but Shila declines pumping at this time as she just had more visitors. Aware to call for pump set up and/or latch assessment.

## 2025-03-02 NOTE — PROVIDER NOTIFICATION
03/02/25 0110   Provider Notification   Provider Name/Title Dr. Causey   Method of Notification At Bedside   Request Attend Delivery     MD notified SVE 10/100/0. MD notified that RN attempted to straight cath pt at 0030, but unable to advance catheter due to fetal station. MD to straight catheterize pt after delivery. MD reviewed FHT. MD at bedside until delivery at 0205. See provider note for delivery details.

## 2025-03-02 NOTE — PROVIDER NOTIFICATION
03/02/25 0324   Provider Notification   Provider Name/Title Dr. Causey   Method of Notification Electronic Page   Request Evaluate - Remote   Notification Reason Maternal Vital Sign Change     MD notified that pt had a severe range BP at 0308, recheck at 0323. TORB to restart magnesium 1g/hr for next 12 hours and for labetalol algorithm.

## 2025-03-02 NOTE — PLAN OF CARE
Data: Shila Batres transferred to 444 via wheelchair at 0515. Baby transferred via parent's arms.  Action: Receiving unit notified of transfer: Yes. Patient and family notified of room change. Report given to Amy RN at 0520. Belongings sent to receiving unit. Accompanied by Registered Nurse. Oriented patient to surroundings. Call light within reach. ID bands double-checked with receiving RN.  Response: Patient tolerated transfer and is stable.    Patients mobililty level scored using the bedside mobility assistance tool (BMAT). Patient is at a mobility level test number: 4. Mobility equipment used: wheelchair. Required assist of 0 staff members. Further use of BMAT scoring not required.

## 2025-03-02 NOTE — PLAN OF CARE
"Pt able to get some rest between cares during the night.  States ordered pain medications keeping her comfortable.  Denies HA, epigastric pain, visual disturbances, N/V.  2+ edema in bilateral ankles and feet.  Normal bilateral reflexes; no clonus.  BPs still elevated, but not in severe range at this time.  IV infusing MagSO4 @ 1 g/hr.  Pt able to void large amt after transfer to unit.  Spouse present for some time after transfer to unit, before returning home; very supportive of patient. Ambulating w/ SBA for assist w/ IV cords only.  Patients mobililty level scored using the bedside mobility assistance tool (BMAT). Patient is at a mobility level test number: 4. Mobility equipment used: none required. Required assist of 0 staff members. Further use of BMAT scoring not required.    Problem: Adult Inpatient Plan of Care  Goal: Plan of Care Review  Description: The Plan of Care Review/Shift note should be completed every shift.  The Outcome Evaluation is a brief statement about your assessment that the patient is improving, declining, or no change.  This information will be displayed automatically on your shift  note.  Outcome: Progressing  Flowsheets (Taken 3/2/2025 1998)  Plan of Care Reviewed With: patient  Overall Patient Progress: improving  Goal: Patient-Specific Goal (Individualized)  Description: You can add care plan individualizations to a care plan. Examples of Individualization might be:  \"Parent requests to be called daily at 9am for status\", \"I have a hard time hearing out of my right ear\", or \"Do not touch me to wake me up as it startles  me\".  Outcome: Progressing  Goal: Absence of Hospital-Acquired Illness or Injury  Outcome: Progressing  Intervention: Prevent Skin Injury  Recent Flowsheet Documentation  Taken 3/2/2025 6182 by Amy oGetz RN  Body Position: position changed independently  Goal: Optimal Comfort and Wellbeing  Outcome: Progressing  Intervention: Provide Person-Centered " Care  Recent Flowsheet Documentation  Taken 3/2/2025 0535 by Amy Goetz RN  Trust Relationship/Rapport:   care explained   choices provided   questions answered   questions encouraged  Goal: Readiness for Transition of Care  Outcome: Progressing     Problem: Postpartum (Vaginal Delivery)  Goal: Successful Parent Role Transition  Outcome: Progressing  Intervention: Support Parent Role Transition  Recent Flowsheet Documentation  Taken 3/2/2025 0535 by Amy Goetz RN  Supportive Measures: self-care encouraged  Parent-Child Attachment Promotion: positive reinforcement provided  Goal: Hemostasis  Outcome: Progressing  Goal: Absence of Infection Signs and Symptoms  Outcome: Progressing  Goal: Anesthesia/Sedation Recovery  Outcome: Progressing  Goal: Optimal Pain Control and Function  Outcome: Progressing  Goal: Effective Urinary Elimination  Outcome: Progressing  Intervention: Monitor and Manage Urinary Retention  Recent Flowsheet Documentation  Taken 3/2/2025 0535 by Amy Goetz RN  Urinary Elimination Promotion: frequent voiding encouraged     Problem: Comorbidity Management  Goal: Blood Pressure in Desired Range  Outcome: Progressing   Goal Outcome Evaluation:      Plan of Care Reviewed With: patient    Overall Patient Progress: improvingOverall Patient Progress: improving

## 2025-03-02 NOTE — PROVIDER NOTIFICATION
03/01/25 2157   Provider Notification   Provider Name/Title Sabal   Method of Notification At Bedside     MD called to bedside to assess FHT and place IUPC. FHT showing variable decelerations and 1x prolonged deceleration while pt lying flat for IUPC placement [id number]. Indeterminate baseline due to artifact on FSE. MD placed IUPC at 2201. MD stayed in department reviewing FHT until 2230. Per MD, try to avoid moving pt until FHT category 1.

## 2025-03-02 NOTE — PLAN OF CARE
Goal Outcome Evaluation:      Plan of Care Reviewed With: patient    Overall Patient Progress: improving    Outcome Evaluation: Magnesium infusing, monitoring blood pressures    Data: Pt had one severe blood pressure this shift, all others in mild range, did not have to treat. Reflexes baseline, no clonus. Denies headache or blurry vision. All other vital signs within normal limits. Postpartum checks within normal limits - see flow record. Patient eating and drinking normally. Patient able to empty bladder independently and is up ambulating. Patient performing self cares, is able to care for infant and is breastfeeding/bottle feeding every 2-3 hours. Pt desires to start pumping this evening. Using ice and tucks pads for perineal discomfort.   Action: Patient medicated with ibuprofen and tylenol during the shift for cramping. See MAR. Adequate pain control noted by patient. Patient education done, see flow record.  Response: Positive attachment behaviors observed with infant. Patient's family present this shift and supportive.   Plan: Continue current plan of care.  Anticipate discharge in 1-2 days. Will continue to monitor and treat.     Problem: Comorbidity Management  Goal: Blood Pressure in Desired Range  Outcome: Not Progressing     Problem: Adult Inpatient Plan of Care  Goal: Plan of Care Review  Description: The Plan of Care Review/Shift note should be completed every shift.  The Outcome Evaluation is a brief statement about your assessment that the patient is improving, declining, or no change.  This information will be displayed automatically on your shift  note.  Outcome: Progressing  Flowsheets (Taken 3/2/2025 1748)  Outcome Evaluation: Magnesium infusing, monitoring blood pressures  Plan of Care Reviewed With: patient  Overall Patient Progress: improving  Goal: Patient-Specific Goal (Individualized)  Description: You can add care plan individualizations to a care plan. Examples of Individualization might  "be:  \"Parent requests to be called daily at 9am for status\", \"I have a hard time hearing out of my right ear\", or \"Do not touch me to wake me up as it startles  me\".  Outcome: Progressing  Goal: Absence of Hospital-Acquired Illness or Injury  Outcome: Progressing  Intervention: Prevent Infection  Recent Flowsheet Documentation  Taken 3/2/2025 1703 by Jaimie Yancey RN  Infection Prevention:   rest/sleep promoted   hand hygiene promoted  Taken 3/2/2025 0920 by Jaimie Yancey RN  Infection Prevention:   rest/sleep promoted   hand hygiene promoted  Goal: Optimal Comfort and Wellbeing  Outcome: Progressing  Intervention: Monitor Pain and Promote Comfort  Recent Flowsheet Documentation  Taken 3/2/2025 1703 by Jaimie Yancey RN  Pain Management Interventions: declines  Taken 3/2/2025 0917 by Jaimie Yancey RN  Pain Management Interventions: medication (see MAR)  Intervention: Provide Person-Centered Care  Recent Flowsheet Documentation  Taken 3/2/2025 1703 by Jaimie Yancey RN  Trust Relationship/Rapport:   care explained   choices provided   emotional support provided   empathic listening provided   questions answered   questions encouraged   reassurance provided   thoughts/feelings acknowledged  Taken 3/2/2025 0920 by Jaimie Yancey RN  Trust Relationship/Rapport:   care explained   choices provided   emotional support provided   empathic listening provided   questions answered   questions encouraged   reassurance provided   thoughts/feelings acknowledged  Goal: Readiness for Transition of Care  Outcome: Progressing     Problem: Postpartum (Vaginal Delivery)  Goal: Successful Parent Role Transition  Outcome: Progressing  Goal: Hemostasis  Outcome: Progressing  Goal: Absence of Infection Signs and Symptoms  Outcome: Progressing  Goal: Optimal Pain Control and Function  Outcome: Progressing  Intervention: Prevent or Manage Pain  Recent Flowsheet Documentation  Taken 3/2/2025 1703 by Jaimie Yancey, " RN  Pain Management Interventions: declines  Taken 3/2/2025 0917 by Jaimie Yancey, RN  Pain Management Interventions: medication (see MAR)  Goal: Effective Urinary Elimination  Outcome: Progressing

## 2025-03-02 NOTE — PROVIDER NOTIFICATION
03/01/25 2155   Provider Notification   Provider Name/Title Sabal   Method of Notification Electronic Page   Request Evaluate in Person   Notification Reason Decels;SVE;Status Update     MD called to bedside to assess FHT.

## 2025-03-02 NOTE — PROVIDER NOTIFICATION
03/01/25 2137   Provider Notification   Provider Name/Title Dr. Causey   Method of Notification Electronic Page   Request Evaluate - Remote   Notification Reason SVE     MD notified that SVE unchanged from previous assessment ~ 1700. FHT moderate variability with accels and occ periods of minimal variability. While checking SVE at 2055, pt started feeling nauseous from laying flat and FHR showed intermittent variable decelerations with jax 90 BPM from 1914-6205. 2104-present, FHT moderate variability no accels or decels. Pitocin currently at 10 mU, ctx q2-3.5 Min apart.     TORB to place IUPC.

## 2025-03-02 NOTE — PROVIDER NOTIFICATION
03/02/25 1710   Provider Notification   Provider Name/Title Dr Godoy   Method of Notification Electronic Page   Request Evaluate-Remote   Notification Reason Vital Signs Change     Pts /103. I will re-check in 15 minutes. If severe again what would you like me to treat with?     Provider response: Labetalol IV. Will increase procardia dose to 30mg BID    Re-check was 152/94, continue Q4 BP monitoring?    Provider response: Yes continue Q4 monitoring.

## 2025-03-02 NOTE — L&D DELIVERY NOTE
OB Vaginal Delivery Note    Shila Batres MRN# [id number]   Age: 39 year old YOB: 1985   40yo  at 37w5d admitted for induction of labor due to CHTN.  Also with AMA and GDMA1  Cytotec x 8 doses then pitocn/AROM    Had episode of hypotension with resultant fetal decels post epidural which improved with treatment of hypotension  GA: 37w6d  GP:   Labor Complications:    EBL:   mL  Delivery QBL: 232 mL  Delivery Type: Vaginal, Spontaneous  ROM to Delivery Time: (Delivered) Hours: 12 Minutes: 47   Weight: 3.08 kg (6 lb 12.6 oz)   1 Minute 5 Minute 10 Minute   Apgar Totals: 8   9        ALEJANDRA MELENDEZ;BLADE AU    Delivery Details:  Shila Batres, a 39 year old  female delivered a viable infant with apgars of 8  and 9 . Patient was fully dilated and pushing after   hours   minutes in active labor. Delivery was via vaginal, spontaneous to a sterile field under epidural anesthesia. Infant delivered in vertex left occiput anterior position. Anterior and posterior shoulders delivered without difficulty. The cord was clamped, cut twice and 3 vessels were noted. Cord blood was obtained in routine fashion with the following disposition: lab.      Cord complications: nuchal  Placenta delivered at 3/2/2025  2:14 AM. Placental disposition was Hospital disposal. Fundal massage performed and fundus found to be firm.     Episiotomy: none   Perineum, vagina, cervix were inspected, and the following lacerations were noted:   Perineal lacerations: none               Any lacerations were repaired in the usual fashion using N/A.    Excellent hemostasis was noted. Needle count correct. Infant and patient in delivery room in good and stable condition.        Mae Batres-Shila [[id number]]      Labor Event Times      Dilation complete date: 25 Complete time:  1:02 AM   Start pushing date/time: 3/2/2025 0114          Labor Events     labor?: No   steroids:  None  Labor Type: Induction/Cervical ripening  Predominate monitoring during 1st stage: continuous electronic fetal monitoring       Rupture date/time: 3/1/25 1319   Rupture type: Artificial Rupture of Membranes  Fluid color: Clear  Fluid odor: Normal     Induction: Misoprostol, Oxytocin, AROM  Induction date/time:      Cervical ripening date/time: 2/28/25 1520    Indications for induction: Chronic Hypertension, Advanced Maternal Age, Diabetes (Comment to specify)     Augmentation: AROM, Oxytocin  Indications for augmentation: Ineffective Contraction Pattern       Delivery/Placenta Date and Time      Delivery Date: 3/2/25 Delivery Time:  2:06 AM   Placenta Date/Time: 3/2/2025  2:14 AM  Oxytocin given at the time of delivery: after delivery of baby  Delivering clinician: Joel Causey MD   Other personnel present at delivery:  Provider Role   Alejandra Muller RN Delivery Nurse   Dora Lewis RN              Vaginal Counts       Initial count performed by 2 team members:  Two Team Members   Dr Marium Ly RN         [place] Suture Needles Sponges (RETIRED) Instruments   Initial counts 2  5    Added to count       Relief counts       Final counts 2  5            Placed during labor Accounted for at the end of labor   FSE Yes    IUPC Yes    Cervidil             Relief count performed by 2 team members:  Two Team Members   tyler   sabal         Final count performed by 2 team members:  Two Team Members   tyler   sabal      Final count correct?: Yes  Pre-Birth Team Brief: Complete       Apgars    Living status: Living   1 Minute 5 Minute 10 Minute 15 Minute 20 Minute   Skin color: 0  1       Heart rate: 2  2       Reflex irritability: 2  2       Muscle tone: 2  2       Respiratory effort: 2  2       Total: 8  9       Apgars assigned by: ALEJANDRA MULLER RN       Cord      Vessels: 3 Vessels    Cord Complications: Nuchal   Nuchal Intervention: reduced         Nuchal cord description: tight nuchal cord     "     Cord Blood Disposition: Lab    Gases Sent?: No    Delayed cord clamping?: Yes    Cord Clamping Delay (seconds):  seconds    Stem cell collection?: No           [place] Resuscitation    Methods: None  [place] Care at Delivery: Asked by Dr. Causey to attend the vaginal delivery of this term, female infant with a gestational age of 37 6/7 weeks secondary to category II FHT and preeclampsia with magnesium.      60 seconds of delayed cord clamping were completed.  The infant was stimulated, cried and had spontaneous respirations during delayed cord clamping.  The infant was placed on a warmer, dried and stimulated.   Gross PE is WNL except for head molding.  Infant required no further resuscitation.  Infant was shown to mother and father, handoff to nursery nurse and will be transferred to the NBN for further care.    Vidya Leonard APRN CNP 3/2/2025 2:15 AM       Output in Delivery Room: Voided        Measurements      Weight: 6 lb 12.6 oz Length: 1' 8\"     Head circumference: 33 cm    Output in delivery room: Voided       Labor Events and Shoulder Dystocia    Fetal Tracing Prior to Delivery: Category 2  Shoulder dystocia present?: Neg       Delivery (Maternal) (Provider to Complete) ([id number])    Episiotomy: None  Perineal lacerations: None    Repair suture: None  Genital tract inspection done: Pos       Blood Loss  Mother: Shila Batres #[id number]     Start of Mother's Information      Delivery Blood Loss   Intrapartum & Postpartum: 25 1406 - 25 0247    Delivery Admission: 25 1301 - 25 0247         Intrapartum & Postpartum Delivery Admission    Delivery QBL (mL) Hospital Encounter 232 mL 232 mL    Total  232 mL 232 mL               End of Mother's Information  Mother: Shila Batres #[id number]                Delivery - Provider to Complete ([id number])    Delivering clinician: Joel Causey MD  Delivery Type (Choose the 1 that will go to the Birth History): " Vaginal, Spontaneous                         Other personnel:  Provider Role   Aliyah Muller, RN Delivery Nurse   Dora Lewis, GONZÁLEZ                     Placenta    Date/Time: 3/2/2025  2:14 AM  Removal: Spontaneous  Comments: intact, 3 vessel cord  Disposition: Hospital disposal             Anesthesia    Method: Epidural                    Presentation and Position    Presentation: Vertex    Position: Left Occiput Anterior                     Joel Causey MD

## 2025-03-02 NOTE — PROVIDER NOTIFICATION
03/02/25 0240   Provider Notification   Provider Name/Title Dr. Causey   Method of Notification At Bedside     MD at bedside, notified RN to discontinue magnesium. Since fetal weight 37th percentile, MD did not place Postpartum GDM orders.

## 2025-03-02 NOTE — ANESTHESIA POSTPROCEDURE EVALUATION
Patient: Shila AMES Medardo        S/P epidural for labor.   I or my partner was immediately available for management of this patient during epidural analgesia infusion.  VSS.  Doing well. Block resolved.  Neuro at baseline. Denies positional headache. Minimal side effects easily managed w/ PRN meds. No apparent anesthetic complications. No follow-up required.    Hi Barker MD         Anesthesia Type:  Epidural    Note:  Anesthesia Post Evaluation    Last vitals:  Vitals:    03/02/25 0520 03/02/25 0532 03/02/25 0535   BP: (!) 155/99 (!) 140/87 (!) 140/87   Pulse: 94 90    Resp: 18  16   Temp:      SpO2:   96%       Electronically Signed By: Hi Barker MD  March 2, 2025  9:20 AM

## 2025-03-02 NOTE — PROVIDER NOTIFICATION
03/01/25 2335   Provider Notification   Provider Name/Title Dr. Causey   Method of Notification At Bedside   Request Evaluate in Person     MD at bedside. SVE per MD- 9/100/-1. MD reviewed FHT, baseline 165-170 BPM, minimal variability, intermittent late deceelrations. MVUs for 2330 165. TORB to increase pitocin to 12 mU and recheck SVE at 0100 or sooner if indicated.

## 2025-03-02 NOTE — PROVIDER NOTIFICATION
03/02/25 1347   Provider Notification   Provider Name/Title Dr Godoy   Method of Notification Electronic Page   Request Evaluate-Remote   Notification Reason Status Update     Hi! Just wanted to touch base about patient. Her mag was re-started after delivery at 03:30 this morning, do you want her mag turned off at the 24 hr aicha, 0330 tomorrow morning? Also we have only been checking BPs Q4 today, are we okay continuing that? Her last 2 were 138/88 and 148/86.     Provider response: Plan to turn off mag at 0330 tomorrow morning and continue to monitor Bps Q4.

## 2025-03-03 LAB
ALBUMIN SERPL BCG-MCNC: 2.8 G/DL (ref 3.5–5.2)
ALP SERPL-CCNC: 259 U/L (ref 40–150)
ALT SERPL W P-5'-P-CCNC: 43 U/L (ref 0–50)
ANION GAP SERPL CALCULATED.3IONS-SCNC: 9 MMOL/L (ref 7–15)
AST SERPL W P-5'-P-CCNC: 43 U/L (ref 0–45)
BILIRUB SERPL-MCNC: 0.2 MG/DL
BUN SERPL-MCNC: 11.7 MG/DL (ref 6–20)
CALCIUM SERPL-MCNC: 8 MG/DL (ref 8.8–10.4)
CHLORIDE SERPL-SCNC: 105 MMOL/L (ref 98–107)
CREAT SERPL-MCNC: 0.94 MG/DL (ref 0.51–0.95)
EGFRCR SERPLBLD CKD-EPI 2021: 78 ML/MIN/1.73M2
ERYTHROCYTE [DISTWIDTH] IN BLOOD BY AUTOMATED COUNT: 18.6 % (ref 10–15)
GLUCOSE SERPL-MCNC: 69 MG/DL (ref 70–99)
HCO3 SERPL-SCNC: 24 MMOL/L (ref 22–29)
HCT VFR BLD AUTO: 29.6 % (ref 35–47)
HGB BLD-MCNC: 9.7 G/DL (ref 11.7–15.7)
MCH RBC QN AUTO: 27.5 PG (ref 26.5–33)
MCHC RBC AUTO-ENTMCNC: 32.8 G/DL (ref 31.5–36.5)
MCV RBC AUTO: 84 FL (ref 78–100)
PLATELET # BLD AUTO: 295 10E3/UL (ref 150–450)
POTASSIUM SERPL-SCNC: 4.8 MMOL/L (ref 3.4–5.3)
PROT SERPL-MCNC: 5.9 G/DL (ref 6.4–8.3)
RBC # BLD AUTO: 3.53 10E6/UL (ref 3.8–5.2)
SODIUM SERPL-SCNC: 138 MMOL/L (ref 135–145)
WBC # BLD AUTO: 19.6 10E3/UL (ref 4–11)

## 2025-03-03 PROCEDURE — 250N000011 HC RX IP 250 OP 636: Performed by: OBSTETRICS & GYNECOLOGY

## 2025-03-03 PROCEDURE — 82310 ASSAY OF CALCIUM: CPT | Performed by: OBSTETRICS & GYNECOLOGY

## 2025-03-03 PROCEDURE — 250N000013 HC RX MED GY IP 250 OP 250 PS 637: Performed by: OBSTETRICS & GYNECOLOGY

## 2025-03-03 PROCEDURE — 258N000003 HC RX IP 258 OP 636: Performed by: OBSTETRICS & GYNECOLOGY

## 2025-03-03 PROCEDURE — 99232 SBSQ HOSP IP/OBS MODERATE 35: CPT | Mod: 24 | Performed by: OBSTETRICS & GYNECOLOGY

## 2025-03-03 PROCEDURE — 85014 HEMATOCRIT: CPT | Performed by: OBSTETRICS & GYNECOLOGY

## 2025-03-03 PROCEDURE — 36415 COLL VENOUS BLD VENIPUNCTURE: CPT | Performed by: OBSTETRICS & GYNECOLOGY

## 2025-03-03 PROCEDURE — 120N000001 HC R&B MED SURG/OB

## 2025-03-03 PROCEDURE — 999N000080 HC STATISTIC IP LACTATION SERVICES 16-30 MIN

## 2025-03-03 PROCEDURE — 84155 ASSAY OF PROTEIN SERUM: CPT | Performed by: OBSTETRICS & GYNECOLOGY

## 2025-03-03 RX ORDER — DIPHENHYDRAMINE HYDROCHLORIDE 50 MG/ML
50 INJECTION, SOLUTION INTRAMUSCULAR; INTRAVENOUS
Status: DISCONTINUED | OUTPATIENT
Start: 2025-03-03 | End: 2025-03-04 | Stop reason: HOSPADM

## 2025-03-03 RX ORDER — ALBUTEROL SULFATE 90 UG/1
1-2 INHALANT RESPIRATORY (INHALATION)
Status: DISCONTINUED | OUTPATIENT
Start: 2025-03-03 | End: 2025-03-04 | Stop reason: HOSPADM

## 2025-03-03 RX ORDER — NIFEDIPINE 30 MG/1
30 TABLET, EXTENDED RELEASE ORAL DAILY
Status: DISCONTINUED | OUTPATIENT
Start: 2025-03-03 | End: 2025-03-03

## 2025-03-03 RX ORDER — METHYLPREDNISOLONE SODIUM SUCCINATE 40 MG/ML
40 INJECTION INTRAMUSCULAR; INTRAVENOUS
Status: DISCONTINUED | OUTPATIENT
Start: 2025-03-03 | End: 2025-03-04 | Stop reason: HOSPADM

## 2025-03-03 RX ORDER — MEPERIDINE HYDROCHLORIDE 25 MG/ML
25 INJECTION INTRAMUSCULAR; INTRAVENOUS; SUBCUTANEOUS
Status: DISCONTINUED | OUTPATIENT
Start: 2025-03-03 | End: 2025-03-04 | Stop reason: HOSPADM

## 2025-03-03 RX ORDER — ALBUTEROL SULFATE 0.83 MG/ML
2.5 SOLUTION RESPIRATORY (INHALATION)
Status: DISCONTINUED | OUTPATIENT
Start: 2025-03-03 | End: 2025-03-04 | Stop reason: HOSPADM

## 2025-03-03 RX ORDER — DIPHENHYDRAMINE HYDROCHLORIDE 50 MG/ML
25 INJECTION, SOLUTION INTRAMUSCULAR; INTRAVENOUS
Status: DISCONTINUED | OUTPATIENT
Start: 2025-03-03 | End: 2025-03-04 | Stop reason: HOSPADM

## 2025-03-03 RX ORDER — NIFEDIPINE 30 MG/1
60 TABLET, EXTENDED RELEASE ORAL 2 TIMES DAILY
Status: DISCONTINUED | OUTPATIENT
Start: 2025-03-03 | End: 2025-03-04 | Stop reason: HOSPADM

## 2025-03-03 RX ORDER — FUROSEMIDE 10 MG/ML
10 INJECTION INTRAMUSCULAR; INTRAVENOUS ONCE
Status: COMPLETED | OUTPATIENT
Start: 2025-03-03 | End: 2025-03-03

## 2025-03-03 RX ADMIN — NIFEDIPINE 60 MG: 30 TABLET, FILM COATED, EXTENDED RELEASE ORAL at 18:33

## 2025-03-03 RX ADMIN — NIFEDIPINE 30 MG: 30 TABLET, FILM COATED, EXTENDED RELEASE ORAL at 08:40

## 2025-03-03 RX ADMIN — IBUPROFEN 800 MG: 800 TABLET, FILM COATED ORAL at 15:36

## 2025-03-03 RX ADMIN — IBUPROFEN 800 MG: 800 TABLET, FILM COATED ORAL at 21:43

## 2025-03-03 RX ADMIN — IBUPROFEN 800 MG: 800 TABLET, FILM COATED ORAL at 08:40

## 2025-03-03 RX ADMIN — PRENATAL VITAMINS-IRON FUMARATE 27 MG IRON-FOLIC ACID 0.8 MG TABLET 1 TABLET: at 08:40

## 2025-03-03 RX ADMIN — FUROSEMIDE 10 MG: 10 INJECTION, SOLUTION INTRAMUSCULAR; INTRAVENOUS at 13:20

## 2025-03-03 RX ADMIN — IRON SUCROSE 300 MG: 20 INJECTION, SOLUTION INTRAVENOUS at 14:20

## 2025-03-03 RX ADMIN — NIFEDIPINE 30 MG: 30 TABLET, FILM COATED, EXTENDED RELEASE ORAL at 13:20

## 2025-03-03 RX ADMIN — SODIUM CHLORIDE, POTASSIUM CHLORIDE, SODIUM LACTATE AND CALCIUM CHLORIDE 100 ML/HR: 600; 310; 30; 20 INJECTION, SOLUTION INTRAVENOUS at 01:21

## 2025-03-03 RX ADMIN — ACETAMINOPHEN 650 MG: 325 TABLET, FILM COATED ORAL at 12:51

## 2025-03-03 RX ADMIN — PSYLLIUM HUSK 1 PACKET: 3.4 POWDER ORAL at 08:40

## 2025-03-03 ASSESSMENT — ACTIVITIES OF DAILY LIVING (ADL)
ADLS_ACUITY_SCORE: 24
ADLS_ACUITY_SCORE: 20
ADLS_ACUITY_SCORE: 15
ADLS_ACUITY_SCORE: 24
ADLS_ACUITY_SCORE: 15
ADLS_ACUITY_SCORE: 24
ADLS_ACUITY_SCORE: 15
ADLS_ACUITY_SCORE: 24
ADLS_ACUITY_SCORE: 24
ADLS_ACUITY_SCORE: 15
ADLS_ACUITY_SCORE: 20
ADLS_ACUITY_SCORE: 24
ADLS_ACUITY_SCORE: 24
ADLS_ACUITY_SCORE: 20
ADLS_ACUITY_SCORE: 24
ADLS_ACUITY_SCORE: 20
ADLS_ACUITY_SCORE: 24

## 2025-03-03 NOTE — PROVIDER NOTIFICATION
03/03/25 1100   Provider Notification   Provider Name/Title Dr. PRADIP Oliveira   Method of Notification Electronic Page   Request Evaluate in Person   Notification Reason Status Update;Vital Signs Change     Writer updated this MD on BP readings. 147/95 at 0740 and 158/90 at 1015, with Nifedipine 30 mg issued at 0840. Continues to deny preeclamptic sx..

## 2025-03-03 NOTE — PLAN OF CARE
"Goal Outcome Evaluation:      Plan of Care Reviewed With: patient    Overall Patient Progress: improvingOverall Patient Progress: improving    Outcome Evaluation: MONITORING BP'S. DENIES SX.      Patient is up independent in room, meeting all personal and infant needs. Blood pressure's being monitored; denies preeclamptic sx.. Is formula feeding, but does also want assist with breastfeeding. Patient will call out. Pain is being managed with Ibuprofen and Tylenol.      Problem: Adult Inpatient Plan of Care  Goal: Plan of Care Review  Description: The Plan of Care Review/Shift note should be completed every shift.  The Outcome Evaluation is a brief statement about your assessment that the patient is improving, declining, or no change.  This information will be displayed automatically on your shift  note.  Outcome: Progressing  Flowsheets (Taken 3/3/2025 0827)  Outcome Evaluation: MONITORING BP'S. DENIES SX.  Plan of Care Reviewed With: patient  Overall Patient Progress: improving  Goal: Patient-Specific Goal (Individualized)  Description: You can add care plan individualizations to a care plan. Examples of Individualization might be:  \"Parent requests to be called daily at 9am for status\", \"I have a hard time hearing out of my right ear\", or \"Do not touch me to wake me up as it startles  me\".  Outcome: Progressing  Goal: Absence of Hospital-Acquired Illness or Injury  Outcome: Progressing  Intervention: Prevent Skin Injury  Recent Flowsheet Documentation  Taken 3/3/2025 0737 by Karen Kelley, RN  Body Position: position changed independently  Intervention: Prevent and Manage VTE (Venous Thromboembolism) Risk  Recent Flowsheet Documentation  Taken 3/3/2025 0737 by Karen Kelley, GONZÁLEZ  VTE Prevention/Management: (walking in room) --  Intervention: Prevent Infection  Recent Flowsheet Documentation  Taken 3/3/2025 0737 by Karen Kelley, RN  Infection Prevention:   rest/sleep promoted   hand " hygiene promoted  Goal: Optimal Comfort and Wellbeing  Outcome: Progressing  Intervention: Monitor Pain and Promote Comfort  Recent Flowsheet Documentation  Taken 3/3/2025 0737 by Karen Kelley RN  Pain Management Interventions:   pain management plan reviewed with patient/caregiver   pillow support provided  Intervention: Provide Person-Centered Care  Recent Flowsheet Documentation  Taken 3/3/2025 0737 by Karen Kelley RN  Trust Relationship/Rapport:   care explained   choices provided   questions answered   questions encouraged   reassurance provided   thoughts/feelings acknowledged  Goal: Readiness for Transition of Care  Outcome: Progressing     Problem: Postpartum (Vaginal Delivery)  Goal: Successful Parent Role Transition  Outcome: Progressing  Intervention: Support Parent Role Transition  Recent Flowsheet Documentation  Taken 3/3/2025 0737 by Karen Kelley RN  Supportive Measures:   active listening utilized   decision-making supported   goal-setting facilitated   self-care encouraged  Parent-Child Attachment Promotion:   strengths emphasized   positive reinforcement provided   participation in care promoted   parent/caregiver presence encouraged   interaction encouraged   caring behavior modeled  Goal: Hemostasis  Outcome: Progressing  Goal: Absence of Infection Signs and Symptoms  Outcome: Progressing  Goal: Optimal Pain Control and Function  Outcome: Progressing  Intervention: Prevent or Manage Pain  Recent Flowsheet Documentation  Taken 3/3/2025 0737 by Karen Kelley RN  Pain Management Interventions:   pain management plan reviewed with patient/caregiver   pillow support provided  Perineal Care: absorbent brief/pad changed  Goal: Effective Urinary Elimination  Outcome: Progressing  Intervention: Monitor and Manage Urinary Retention  Recent Flowsheet Documentation  Taken 3/3/2025 0737 by Karen Kelley RN  Urinary Elimination Promotion: frequent  voiding encouraged     Problem: Comorbidity Management  Goal: Blood Pressure in Desired Range  Outcome: Progressing

## 2025-03-03 NOTE — PROGRESS NOTES
"Postpartum Progress Note  Shila Batres  [id number]    Subjective:   Patiet doing well overall. Pain well controlled with POs. Denies nausea and vomiting. Ambulating without difficulty. Adequate PO intake. Breast feeding and bottle feeding. Reports tingling and discomfort with swollen hands and feet.     Objective:  BP (!) 158/90 (BP Location: Right arm, Patient Position: Semi-Dillon's, Cuff Size: Adult Large)   Pulse 86   Temp 98.2  F (36.8  C) (Oral)   Resp 18   Ht 1.575 m (5' 2\")   Wt 79.6 kg (175 lb 6.4 oz)   LMP 06/10/2024   SpO2 98%   Breastfeeding Unknown   BMI 32.08 kg/m    General: resting comfortably, in NAD  Heart: regular rate, well perfused  Lungs: non-labored breathing, no cough  Abdomen: soft, non distended, appropriately tender to palpation, FF at U 1  Extremities: +2 ble edema, non-tender to palpation    Labs:  Hemoglobin   Date Value Ref Range Status   2025 9.4 (L) 11.7 - 15.7 g/dL Final   2025 11.3 (L) 11.7 - 15.7 g/dL Final   2016 13.1 11.7 - 15.7 g/dL Final   2010 14.3 11.7 - 15.7 g/dL Final     Recent Labs   Lab 25  0123 25  2243 25  2045 25  1851 25  1644 25  1018   * 104* 99 99 100* 138*        Assessment/Plan:  39 year old  who is PPD#1 s/p .  Currently stable and doing well  - CHTN: on nifedipine XL 30mg bid, BPs high mild range today. Repeat HELLP labs pending. Increasing nifedipine to 60mg twice a day given persistent high mild BPs throughout the day. She is s/p 24 hours of PP IV magnesium for seizure ppx   - requesting treatment for discomfort with indu. IV lasix 10mg x1.  - GDMA1: plan 2hr GTT 6w PP  - Routine post-partum cares  - Heme: ABLA, PO plan single dose of IV iron.  - Pain: continue tylenol, ibuprofen, ice packs, hot packs as needed  - Baby: in room doing well  - Dispo: d/c to home likely tomorrow    In addition to routine postpartum care, 25 minutes spent by me on the date of the " encounter doing chart review, review of test results, interpretation of tests, patient visit, and documentation      Laquita Oliveira MD  OB/GYN

## 2025-03-03 NOTE — PLAN OF CARE
Vital signs stable with exception of mild range blood pressure. Evening dose of labetalol given. Denies visual changes, headache and respiratory symptoms. No clonus, DTR WNL. Magnesium sulfate running at 1g/hr until 0330 3/3. Postpartum assessment WDL. Pain well controlled. Up ad/marilyn. Voiding w/o difficulty, encouraged frequent voiding. Tolerating a regular diet. Breastfeeding and bottle feeding  every 2-3 hours. Bonding well with  and independent with cares.  Questions/concerns addressed.      Problem: Adult Inpatient Plan of Care  Goal: Plan of Care Review  Description: The Plan of Care Review/Shift note should be completed every shift.  The Outcome Evaluation is a brief statement about your assessment that the patient is improving, declining, or no change.  This information will be displayed automatically on your shift  note.  Outcome: Progressing  Flowsheets (Taken 3/2/2025 2209)  Plan of Care Reviewed With: patient  Overall Patient Progress: improving  Goal: Absence of Hospital-Acquired Illness or Injury  Outcome: Progressing  Intervention: Prevent Skin Injury  Recent Flowsheet Documentation  Taken 3/2/2025 2134 by Mireya Martinez RN  Body Position: position changed independently  Intervention: Prevent Infection  Recent Flowsheet Documentation  Taken 3/2/2025 2134 by Mireya Martinez, RN  Infection Prevention:   hand hygiene promoted   rest/sleep promoted   single patient room provided  Goal: Optimal Comfort and Wellbeing  Outcome: Progressing  Intervention: Provide Person-Centered Care  Recent Flowsheet Documentation  Taken 3/2/2025 2134 by Mireya Martinez RN  Trust Relationship/Rapport:   care explained   choices provided   questions answered   questions encouraged   thoughts/feelings acknowledged  Goal: Readiness for Transition of Care  Outcome: Progressing     Problem: Postpartum (Vaginal Delivery)  Goal: Successful Parent Role Transition  Outcome: Progressing  Intervention: Support Parent  Role Transition  Recent Flowsheet Documentation  Taken 3/2/2025 2134 by Mireya Martinez, RN  Supportive Measures:   active listening utilized   decision-making supported   self-care encouraged  Parent-Child Attachment Promotion:   caring behavior modeled   cue recognition promoted   rooming-in promoted   skin-to-skin contact encouraged  Goal: Hemostasis  Outcome: Progressing  Goal: Absence of Infection Signs and Symptoms  Outcome: Progressing  Goal: Optimal Pain Control and Function  Outcome: Progressing  Goal: Effective Urinary Elimination  Outcome: Progressing     Problem: Comorbidity Management  Goal: Blood Pressure in Desired Range  Outcome: Progressing   Goal Outcome Evaluation:      Plan of Care Reviewed With: patient    Overall Patient Progress: improvingOverall Patient Progress: improving

## 2025-03-03 NOTE — PLAN OF CARE
"Pt able to get small periods of rest during the night.  Denies need for pain medications at this time.  BP still elevated, but not in severe range.  Magnesium sulfate discontinued @ 0330.  Normal bilateral reflexes; no clonus.  Denies HA, visual disturbances, epigastric pain, N/V.  Increase in edema in L foot.  Voiding sufficient amts frequently.  Lungs clear and equal bilaterally.  Slightly tachycardic, but regular rhythm.  Ambulating independently.  No support person present this shift.  Problem: Adult Inpatient Plan of Care  Goal: Plan of Care Review  Description: The Plan of Care Review/Shift note should be completed every shift.  The Outcome Evaluation is a brief statement about your assessment that the patient is improving, declining, or no change.  This information will be displayed automatically on your shift  note.  Outcome: Progressing  Flowsheets (Taken 3/3/2025 0526)  Outcome Evaluation:   Magnesium stopped   BPs still elevated.  Plan of Care Reviewed With: patient  Overall Patient Progress: improving  Goal: Patient-Specific Goal (Individualized)  Description: You can add care plan individualizations to a care plan. Examples of Individualization might be:  \"Parent requests to be called daily at 9am for status\", \"I have a hard time hearing out of my right ear\", or \"Do not touch me to wake me up as it startles  me\".  Outcome: Progressing  Goal: Absence of Hospital-Acquired Illness or Injury  Outcome: Progressing  Intervention: Prevent Skin Injury  Recent Flowsheet Documentation  Taken 3/3/2025 0250 by Amy Goetz, RN  Body Position: position changed independently  Taken 3/3/2025 0135 by Amy Goetz, RN  Body Position: position changed independently  Goal: Optimal Comfort and Wellbeing  Outcome: Progressing  Intervention: Provide Person-Centered Care  Recent Flowsheet Documentation  Taken 3/3/2025 0135 by Amy Goetz, RN  Trust Relationship/Rapport:   care explained   choices provided   " questions answered   questions encouraged  Goal: Readiness for Transition of Care  Outcome: Progressing     Problem: Comorbidity Management  Goal: Blood Pressure in Desired Range  Outcome: Progressing     Problem: Postpartum (Vaginal Delivery)  Goal: Successful Parent Role Transition  Outcome: Progressing  Intervention: Support Parent Role Transition  Recent Flowsheet Documentation  Taken 3/3/2025 0135 by Amy Goetz RN  Supportive Measures: active listening utilized  Parent-Child Attachment Promotion: positive reinforcement provided  Goal: Hemostasis  Outcome: Progressing  Goal: Absence of Infection Signs and Symptoms  Outcome: Progressing  Goal: Optimal Pain Control and Function  Outcome: Progressing  Intervention: Prevent or Manage Pain  Recent Flowsheet Documentation  Taken 3/3/2025 0250 by Amy Goetz RN  Perineal Care:   perineal hygiene encouraged   perineal spray bottle/warm water use encouraged  Taken 3/3/2025 0135 by Amy Goetz RN  Perineal Care: perineal spray bottle/warm water use encouraged  Goal: Effective Urinary Elimination  Outcome: Progressing  Intervention: Monitor and Manage Urinary Retention  Recent Flowsheet Documentation  Taken 3/3/2025 0135 by Amy Goetz RN  Urinary Elimination Promotion: frequent voiding encouraged   Goal Outcome Evaluation:      Plan of Care Reviewed With: patient    Overall Patient Progress: improvingOverall Patient Progress: improving    Outcome Evaluation: Magnesium stopped; BPs still elevated.

## 2025-03-03 NOTE — PLAN OF CARE
"Goal Outcome Evaluation:      Plan of Care Reviewed With: patient    Overall Patient Progress: improving    Outcome Evaluation: Monitoring BPs closely    Data: Blood pressures remain in mildly elevated range today. Pt denies pre-e symptoms. Reflexes baseline, no clonus. All other vital signs within normal limits. Postpartum checks within normal limits - see flow record. Patient eating and drinking normally. Patient able to empty bladder independently and is up ambulating. Patient performing self cares, is able to care for infant and is breastfeeding/[pumping/bottle feeding infant with formula every 2-3 hours. Using abdominal binder for discomfort.   Action: Patient medicated with ibuprofen during the shift for cramping. See MAR. Adequate pain control noted by patient. Patient education done, see flow record.  Response: Positive attachment behaviors observed with infant. Patient's family present this shift.   Plan: Continue current plan of care.  Anticipate discharge in 1-2 days. Will continue to monitor and treat.     Problem: Comorbidity Management  Goal: Blood Pressure in Desired Range  Outcome: Not Progressing     Problem: Adult Inpatient Plan of Care  Goal: Plan of Care Review  Description: The Plan of Care Review/Shift note should be completed every shift.  The Outcome Evaluation is a brief statement about your assessment that the patient is improving, declining, or no change.  This information will be displayed automatically on your shift  note.  Outcome: Progressing  Flowsheets (Taken 3/3/2025 1715)  Outcome Evaluation: Monitoring BPs closely  Plan of Care Reviewed With: patient  Overall Patient Progress: improving  Goal: Patient-Specific Goal (Individualized)  Description: You can add care plan individualizations to a care plan. Examples of Individualization might be:  \"Parent requests to be called daily at 9am for status\", \"I have a hard time hearing out of my right ear\", or \"Do not touch me to wake me up " "as it startles  me\".  Outcome: Progressing  Goal: Absence of Hospital-Acquired Illness or Injury  Outcome: Progressing  Intervention: Prevent Infection  Recent Flowsheet Documentation  Taken 3/3/2025 1531 by Jaimie Yancey RN  Infection Prevention:   hand hygiene promoted   rest/sleep promoted  Goal: Optimal Comfort and Wellbeing  Outcome: Progressing  Intervention: Monitor Pain and Promote Comfort  Recent Flowsheet Documentation  Taken 3/3/2025 1531 by Jaimie Yancey, RN  Pain Management Interventions: medication (see MAR)  Intervention: Provide Person-Centered Care  Recent Flowsheet Documentation  Taken 3/3/2025 1531 by Jaimie Yancey RN  Trust Relationship/Rapport:   care explained   emotional support provided   choices provided   empathic listening provided   questions answered   questions encouraged   reassurance provided   thoughts/feelings acknowledged  Goal: Readiness for Transition of Care  Outcome: Progressing     Problem: Postpartum (Vaginal Delivery)  Goal: Successful Parent Role Transition  Outcome: Progressing  Goal: Hemostasis  Outcome: Progressing  Goal: Absence of Infection Signs and Symptoms  Outcome: Progressing  Goal: Optimal Pain Control and Function  Outcome: Progressing  Intervention: Prevent or Manage Pain  Recent Flowsheet Documentation  Taken 3/3/2025 1531 by Jaimie Yancey RN  Pain Management Interventions: medication (see MAR)  Goal: Effective Urinary Elimination  Outcome: Progressing     "

## 2025-03-04 VITALS
HEIGHT: 62 IN | OXYGEN SATURATION: 98 % | WEIGHT: 171.8 LBS | DIASTOLIC BLOOD PRESSURE: 81 MMHG | BODY MASS INDEX: 31.62 KG/M2 | SYSTOLIC BLOOD PRESSURE: 137 MMHG | TEMPERATURE: 98.4 F | HEART RATE: 98 BPM | RESPIRATION RATE: 18 BRPM

## 2025-03-04 PROBLEM — O10.919 CHRONIC HYPERTENSION AFFECTING PREGNANCY: Status: ACTIVE | Noted: 2025-03-04

## 2025-03-04 PROBLEM — O11.9 CHRONIC HYPERTENSION WITH SUPERIMPOSED PREECLAMPSIA: Status: ACTIVE | Noted: 2025-03-04

## 2025-03-04 PROCEDURE — 250N000011 HC RX IP 250 OP 636: Performed by: OBSTETRICS & GYNECOLOGY

## 2025-03-04 PROCEDURE — 250N000013 HC RX MED GY IP 250 OP 250 PS 637: Performed by: OBSTETRICS & GYNECOLOGY

## 2025-03-04 RX ORDER — IBUPROFEN 800 MG/1
800 TABLET, FILM COATED ORAL EVERY 6 HOURS PRN
Qty: 45 TABLET | Refills: 1 | Status: SHIPPED | OUTPATIENT
Start: 2025-03-04

## 2025-03-04 RX ORDER — FUROSEMIDE 10 MG/ML
10 INJECTION INTRAMUSCULAR; INTRAVENOUS ONCE
Status: COMPLETED | OUTPATIENT
Start: 2025-03-04 | End: 2025-03-04

## 2025-03-04 RX ORDER — ACETAMINOPHEN 325 MG/1
650 TABLET ORAL EVERY 4 HOURS PRN
Qty: 45 TABLET | Refills: 1 | Status: SHIPPED | OUTPATIENT
Start: 2025-03-04

## 2025-03-04 RX ADMIN — ACETAMINOPHEN 650 MG: 325 TABLET, FILM COATED ORAL at 04:43

## 2025-03-04 RX ADMIN — FUROSEMIDE 10 MG: 10 INJECTION, SOLUTION INTRAMUSCULAR; INTRAVENOUS at 12:36

## 2025-03-04 RX ADMIN — PRENATAL VITAMINS-IRON FUMARATE 27 MG IRON-FOLIC ACID 0.8 MG TABLET 1 TABLET: at 07:59

## 2025-03-04 RX ADMIN — ACETAMINOPHEN 650 MG: 325 TABLET, FILM COATED ORAL at 12:36

## 2025-03-04 RX ADMIN — PSYLLIUM HUSK 1 PACKET: 3.4 POWDER ORAL at 07:59

## 2025-03-04 RX ADMIN — IBUPROFEN 800 MG: 800 TABLET, FILM COATED ORAL at 07:59

## 2025-03-04 RX ADMIN — NIFEDIPINE 60 MG: 30 TABLET, FILM COATED, EXTENDED RELEASE ORAL at 06:56

## 2025-03-04 ASSESSMENT — ACTIVITIES OF DAILY LIVING (ADL)
ADLS_ACUITY_SCORE: 24

## 2025-03-04 NOTE — PROVIDER NOTIFICATION
03/04/25 1215   Provider Notification   Provider Name/Title Dr. Pierson   Method of Notification Electronic Page   Request Evaluate-Remote   Notification Reason Vital Signs Change;Status Update     Informed MD of BP of 160/99. No preeclamptic sx..  MD ordered IV lasix 10 mg x 1 now. 15 minute follow up /87. Patient feeling anxious and tearful, worried about her son who is at home and is post surgery. Wants to go home. Writer and OB did then speak. Since BP more stable, give IV lasix, check BP in 2 hours. If stable, patient may discharge home with BP cuff. Continue with Nifedipine BID 60 mg.   BP at 1445 was 137/81.   Patient is to follow up in clinic in 1 week for BP check, Home care will come out this week, patient is to check BP twice daily and call for severe range BP's. BP cuff and Breast pump issued. Discussed how to use. Pamphlet with BP parameters will also be issued on when to call OB. Preeclamptic sx.. added to AVS and will be reviewed by evening GONZÁLEZ Jones who assumes all cares.

## 2025-03-04 NOTE — PLAN OF CARE
"Goal Outcome Evaluation:      Plan of Care Reviewed With: patient    Overall Patient Progress: improvingOverall Patient Progress: improving    Outcome Evaluation: Monitor BP's closely    Patient meeting expected goals. BP in mild range. . Denies preeclamptic sx.. Continues on Nifedipine XL 60 mg BID. Weight down 6 lbs.Is up independent in room, meeting all personal and infant needs. Other VS. Pain is being managed with Ibuprofen and Tylenol. Formula feeding infant. Encouraged frequent pumping (yesterday and today), but did not pump overnight or evenings. Education given on importance of nipple stimulation to promote milk supply. Encouraged to bring infant to breast before formula and to call out for assistance with latch.      Problem: Adult Inpatient Plan of Care  Goal: Plan of Care Review  Description: The Plan of Care Review/Shift note should be completed every shift.  The Outcome Evaluation is a brief statement about your assessment that the patient is improving, declining, or no change.  This information will be displayed automatically on your shift  note.  Outcome: Progressing  Flowsheets (Taken 3/4/2025 0824)  Outcome Evaluation: Monitor BP's closely  Plan of Care Reviewed With: patient  Overall Patient Progress: improving  Goal: Patient-Specific Goal (Individualized)  Description: You can add care plan individualizations to a care plan. Examples of Individualization might be:  \"Parent requests to be called daily at 9am for status\", \"I have a hard time hearing out of my right ear\", or \"Do not touch me to wake me up as it startles  me\".  Outcome: Progressing  Goal: Absence of Hospital-Acquired Illness or Injury  Outcome: Progressing  Intervention: Prevent Skin Injury  Recent Flowsheet Documentation  Taken 3/4/2025 0815 by Karen Kelley, RN  Body Position: position changed independently  Intervention: Prevent and Manage VTE (Venous Thromboembolism) Risk  Recent Flowsheet Documentation  Taken 3/4/2025 " 0815 by Karen Kelley RN  VTE Prevention/Management: (walking in room) SCDs off (sequential compression devices)  Intervention: Prevent Infection  Recent Flowsheet Documentation  Taken 3/4/2025 0815 by Karen Kelley RN  Infection Prevention:   rest/sleep promoted   hand hygiene promoted  Goal: Optimal Comfort and Wellbeing  Outcome: Progressing  Intervention: Monitor Pain and Promote Comfort  Recent Flowsheet Documentation  Taken 3/4/2025 0759 by Karen Kelley RN  Pain Management Interventions:   medication (see MAR)   pain management plan reviewed with patient/caregiver   pillow support provided  Intervention: Provide Person-Centered Care  Recent Flowsheet Documentation  Taken 3/4/2025 0815 by Karen Kelley RN  Trust Relationship/Rapport:   care explained   choices provided   emotional support provided   empathic listening provided   questions answered   questions encouraged   reassurance provided   thoughts/feelings acknowledged  Goal: Readiness for Transition of Care  Outcome: Progressing     Problem: Postpartum (Vaginal Delivery)  Goal: Successful Parent Role Transition  Outcome: Progressing  Intervention: Support Parent Role Transition  Recent Flowsheet Documentation  Taken 3/4/2025 0815 by Karen Kelley RN  Supportive Measures:   active listening utilized   decision-making supported   goal-setting facilitated   positive reinforcement provided   self-care encouraged   self-responsibility promoted   verbalization of feelings encouraged  Parent-Child Attachment Promotion:   caring behavior modeled   interaction encouraged   strengths emphasized   positive reinforcement provided   participation in care promoted   parent/caregiver presence encouraged   cue recognition promoted  Goal: Hemostasis  Outcome: Progressing  Goal: Absence of Infection Signs and Symptoms  Outcome: Progressing  Goal: Optimal Pain Control and Function  Outcome: Progressing  Intervention:  Prevent or Manage Pain  Recent Flowsheet Documentation  Taken 3/4/2025 0815 by Karen Kelley, RN  Perineal Care: absorbent brief/pad changed  Taken 3/4/2025 0759 by Karen Kelley, RN  Pain Management Interventions:   medication (see MAR)   pain management plan reviewed with patient/caregiver   pillow support provided  Goal: Effective Urinary Elimination  Outcome: Progressing  Intervention: Monitor and Manage Urinary Retention  Recent Flowsheet Documentation  Taken 3/4/2025 0815 by Karen Kelley, RN  Urinary Elimination Promotion: frequent voiding encouraged     Problem: Comorbidity Management  Goal: Blood Pressure in Desired Range  Outcome: Progressing

## 2025-03-04 NOTE — DISCHARGE SUMMARY
VAGINAL DELIVERY DISCHARGE SUMMARY    Admit date: 2025  Discharge date: 3/4/2025     Admit Dx:   39 year old  at 37w6d    Antepartum multigravida of advanced maternal age [O09.529]  Hypertension affecting pregnancy in second trimester [O16.2]  History of gestational diabetes in prior pregnancy, currently pregnant [O09.299, Z86.32]  Ovarian cyst during pregnancy in second trimester [O34.82, N83.209]     Discharge Dx:  - Same as above, s/p   - cHTN with superimposed preE with severe features    Procedures:  - Spontaneous vaginal delivery    Admit HPI:    39 year old  at 37w4d presented with mild and occasional severe range BP at clinic today.  Then was at [facility] where she had an 8/10 BPP, 2 points off for movement.   EFW AGA 71%ile with AC 96%ile.  GDMA1  cHTN on nifedipine 30XL with plan to delivery in the 37 week.     After discussion of BPs with patient, she is amenable to staying for IOL due to cHTN not controlled on meds.  Continue nifedipine 30 XL, use IV labetalol protocol if spiking severe BP.  Cvx closed/30/-4, plan for cytotec for cervical ripening.   Planning epidural for active labor.      Please see her admit H&P for full details of her PMH, PSH, Meds, Allergies and exam on admit.    Hospital course:  Shila Batres, a 39 year old  female delivered a viable infant with apgars of 8  and 9 . Patient was fully dilated and pushing after   hours   minutes in active labor. Delivery was via vaginal, spontaneous to a sterile field under epidural anesthesia. Infant delivered in vertex left occiput anterior position. Anterior and posterior shoulders delivered without difficulty. The cord was clamped, cut twice and 3 vessels were noted. Cord blood was obtained in routine fashion with the following disposition: lab.       Cord complications: nuchal  Placenta delivered at 3/2/2025  2:14 AM. Placental disposition was Hospital disposal. Fundal massage performed and fundus found to be firm.       Episiotomy: none   Perineum, vagina, cervix were inspected, and the following lacerations were noted:   Perineal lacerations: none                Any lacerations were repaired in the usual fashion using N/A.     Excellent hemostasis was noted. Needle count correct. Infant and patient in delivery room in good and stable condition.      Please see her Delivery Summary for full details regarding her delivery.    Her postpartum course was complicated by nothing. By the time of discharge, she was meeting all of her postpartum goals and deemed stable for discharge. She was voiding without difficulty, tolerating a regular diet without nausea and vomiting, her pain was well controlled on oral pain medicines and her lochia was appropriate. She was hemodynamically stable.     Physical exam on the day of discharge:  Vitals:    03/04/25 0215 03/04/25 0655 03/04/25 0808 03/04/25 0956   BP: 125/87 124/86 (!) 137/92 (!) 140/83   BP Location:   Right arm Right arm   Patient Position:   Semi-Dillon's Semi-Dillon's   Cuff Size:   Adult Large Adult Large   Pulse: 95 104 109 98   Resp:   18    Temp:   98.4  F (36.9  C)    TempSrc:   Oral    SpO2:       Weight:  77.6 kg (171 lb 1.2 oz)     Height:           General: sitting up, alert and cooperative  Abd: soft, non-distended, non-tender. Fundus firm, nontender, 2 cm below umbilicus.   Extremities: calves nontender, 1+ to 2+ edema of lower extremities bilaterally    Lab Results   Component Value Date    HGB 9.7 03/03/2025    HGB 9.4 03/02/2025    HGB 13.1 02/21/2016    HGB 14.3 05/12/2010     Blood type:   Lab Results   Component Value Date    RH  Pos 09/21/2009       Discharge/Disposition:  Ms. Shila Batres was discharged to home in stable condition with the following instructions/medications:  1) Call for temperature > 100.4, foul smelling vaginal discharge, bleeding > 1 pad per hour x 2 hrs, pain not controlled by oral pain meds, thoughts of self-harm or harming the  infant.  2) Contraception counseling was provided.  3) She was instructed to follow-up with her primary OB in 6 weeks for a routine postpartum visit, and in 1 week for a blood pressure check if having any blood pressure issues while hospitalized.  4) She was instructed to continue her PNV on discharge if she wished to breast feed her infant.  5) She was discharged home with the following medications:    Current Discharge Medication List        START taking these medications    Details   !! acetaminophen (TYLENOL) 325 MG tablet Take 2 tablets (650 mg) by mouth every 4 hours as needed for fever or other (second line or per patient preference for mild to moderate pain management).  Qty: 45 tablet, Refills: 1    Associated Diagnoses: Hypertension affecting pregnancy in second trimester; History of gestational diabetes in prior pregnancy, currently pregnant; Hypertension affecting pregnancy in third trimester      ibuprofen (ADVIL/MOTRIN) 800 MG tablet Take 1 tablet (800 mg) by mouth every 6 hours as needed for other (first line or per patient preference for mild to moderate pain management).  Qty: 45 tablet, Refills: 1    Associated Diagnoses: Hypertension affecting pregnancy in second trimester; History of gestational diabetes in prior pregnancy, currently pregnant; Hypertension affecting pregnancy in third trimester       !! - Potential duplicate medications found. Please discuss with provider.        CONTINUE these medications which have CHANGED    Details   NIFEdipine ER OSMOTIC (ADALAT CC) 60 MG 24 hr tablet Take 1 tablet (60 mg) by mouth 2 times daily.  Qty: 60 tablet, Refills: 1    Associated Diagnoses: Hypertension affecting pregnancy in second trimester; History of gestational diabetes in prior pregnancy, currently pregnant; Hypertension affecting pregnancy in third trimester           CONTINUE these medications which have NOT CHANGED    Details   aspirin 81 MG EC tablet Take 1 tablet (81 mg) by mouth  daily.  Qty: 90 tablet, Refills: 3    Associated Diagnoses: Hypertension affecting pregnancy in second trimester      Prenatal Vit-Fe Fumarate-FA (PRENATAL MULTIVITAMIN W/IRON) 27-0.8 MG tablet Take 1 tablet by mouth daily  Qty: 90 tablet, Refills: 3    Associated Diagnoses: High-risk pregnancy, elderly multigravida, unspecified trimester      !! acetaminophen (TYLENOL) 500 MG tablet Take 1-2 tablets (500-1,000 mg) by mouth every 6 hours as needed for mild pain.  Qty: 30 tablet, Refills: 0    Associated Diagnoses: Chills      acetone urine (KETOSTIX) test strip Use one strip daily to check urine ketones in the morning per  directions.  Qty: 50 strip, Refills: 1    Associated Diagnoses: Abnormal maternal glucose tolerance, antepartum      albuterol (VENTOLIN HFA) 108 (90 Base) MCG/ACT inhaler INHALE 2 PUFFS INTO THE LUNGS EVERY 6 HOURS  Qty: 18 g, Refills: 0    Comments: Pharmacy may dispense brand covered by insurance (Proair, or proventil or ventolin or generic albuterol inhaler)  Associated Diagnoses: Cough, unspecified type      blood glucose (NO BRAND SPECIFIED) test strip Use to test blood sugar 4 times daily or as directed. To accompany: Blood Glucose Monitor Brands: per insurance.  Qty: 150 strip, Refills: 1    Associated Diagnoses: Abnormal maternal glucose tolerance, antepartum      blood glucose monitoring (NO BRAND SPECIFIED) meter device kit Use to test blood sugar 4 times daily or as directed. Preferred blood glucose meter OR supplies to accompany: Blood Glucose Monitor Brands: per insurance.  Qty: 1 kit, Refills: 0    Associated Diagnoses: Abnormal maternal glucose tolerance, antepartum      dextromethorphan (DELSYM) 30 MG/5ML liquid Take 10 mLs (60 mg) by mouth 2 times daily.  Qty: 148 mL, Refills: 0    Associated Diagnoses: Cough, unspecified type      famotidine (PEPCID) 20 MG tablet Take 1 tablet (20 mg) by mouth 2 times daily.  Qty: 60 tablet, Refills: 0    Associated Diagnoses:  Heartburn during pregnancy in third trimester      thin (NO BRAND SPECIFIED) lancets Use with lancing device to check glucose 4 times daily per  direction. To accompany: Blood Glucose Monitor Brands: per insurance.  Qty: 200 each, Refills: 1    Associated Diagnoses: Abnormal maternal glucose tolerance, antepartum       !! - Potential duplicate medications found. Please discuss with provider.           Apolonia Pierson MD, MPH  [facility]

## 2025-03-04 NOTE — PLAN OF CARE
Goal Outcome Evaluation:      Plan of Care Reviewed With: patient    Overall Patient Progress: improvingOverall Patient Progress: improving     Discharge education complete with patient and spouse. Questions encouraged and all questions answered. Bands checked. Baby sensor removed. Weight done. Discharge medications discussed and given to patient. Discharging to home with family. Was given breast pump and blood pressure cuff. Educated on taking bp twice a day and logging them to bring to follow up appointment.      Problem: Adult Inpatient Plan of Care  Goal: Plan of Care Review  Outcome: Met  Flowsheets (Taken 3/4/2025 1604)  Plan of Care Reviewed With: patient  Overall Patient Progress: improving  Goal: Patient-Specific Goal (Individualized)  Outcome: Met  Goal: Absence of Hospital-Acquired Illness or Injury  Outcome: Met  Goal: Optimal Comfort and Wellbeing  Outcome: Met  Goal: Readiness for Transition of Care  Outcome: Met     Problem: Postpartum (Vaginal Delivery)  Goal: Successful Parent Role Transition  Outcome: Met  Goal: Hemostasis  Outcome: Met  Goal: Absence of Infection Signs and Symptoms  Outcome: Met  Goal: Optimal Pain Control and Function  Outcome: Met  Goal: Effective Urinary Elimination  Outcome: Met     Problem: Comorbidity Management  Goal: Blood Pressure in Desired Range  Outcome: Met

## 2025-03-04 NOTE — DISCHARGE INSTRUCTIONS
Follow up in clinic on Friday, March 7th for BP check.  Follow up in 6 weeks for Postpartum visit. OR sooner for any concerns.  [facility] to come out to check you BP and check on baby.  Check your BP at home twice daily; see pamphlet hand out on when to call OB.    Warning Signs after Having a Baby    Keep this paper on your fridge or somewhere else where you can see it.    Call your provider if you have any of these symptoms up to 12 weeks after having your baby.    Thoughts of hurting yourself or your baby  Pain in your chest or trouble breathing  Severe headache not helped by pain medicine  Eyesight concerns (blurry vision, seeing spots or flashes of light, other changes to eyesight)  Fainting, shaking or other signs of a seizure    Call 9-1-1 if you feel that it is an emergency.     The symptoms below can happen to anyone after giving birth. They can be very serious. Call your provider if you have any of these warning signs.    My provider s phone number: _______________________    Losing too much blood (hemorrhage)    Call your provider if you soak through a pad in less than an hour or pass blood clots bigger than a golf ball. These may be signs that you are bleeding too much.    Blood clots in the legs or lungs    After you give birth, your body naturally clots its blood to help prevent blood loss. Sometimes this increased clotting can happen in other areas of the body, like the legs or lungs. This can block your blood flow and be very dangerous.     Call your provider if you:  Have a red, swollen spot on the back of your leg that is warm or painful when you touch it.   Are coughing up blood.     Infection    Call your provider if you have any of these symptoms:  Fever of 100.4 F (38 C) or higher.  Pain or redness around your stitches if you had an incision.   Any yellow, white, or green fluid coming from places where you had stitches or surgery.    Mood Problems (postpartum depression)    Many people  feel sad or have mood changes after having a baby. But for some people, these mood swings are worse.     Call your provider right away if you feel so anxious or nervous that you can't care for yourself or your baby.    Preeclampsia (high blood pressure)    Even if you didn't have high blood pressure when you were pregnant, you are at risk for the high blood pressure disease called preeclampsia. This risk can last up to 12 weeks after giving birth.     Call your provider if you have:   Pain on your right side under your rib cage  Sudden swelling in the hands and face    Remember: You know your body. If something doesn't feel right, get medical help.     For informational purposes only. Not to replace the advice of your health care provider. Copyright 2020 [facility]. All rights reserved. Clinically reviewed by Lindy Maguire RNC-OB, MSN. [employer] [id number] - Rev 02/23.

## 2025-03-04 NOTE — PROVIDER NOTIFICATION
03/03/25 1815   Provider Notification   Provider Name/Title Dr MARLY Oliveira   Method of Notification Electronic Page   Request Evaluate-Remote   Notification Reason Status Update     Hi there! Just wanted to update you that pts last 2 blood pressures were 155/99 and 153/92. Her first dose of the 60mg procardia is tonight at 9pm.    Provider order to give 60mg procardia now, and re-time tomorrow morning dose for 7am.

## 2025-03-04 NOTE — PLAN OF CARE
"Patient meeting expected outcomes. Pain well managed with PRN pain medication. Breast and formula feeding  per patient preference. Blood pressures stable this shift, denies pre-eclampsia symptoms. Independent with self and  cares. Hoping to discharge home with  today.    Problem: Adult Inpatient Plan of Care  Goal: Plan of Care Review  Description: The Plan of Care Review/Shift note should be completed every shift.  The Outcome Evaluation is a brief statement about your assessment that the patient is improving, declining, or no change.  This information will be displayed automatically on your shift  note.  Outcome: Progressing  Flowsheets (Taken 3/4/2025 0249)  Plan of Care Reviewed With: patient  Overall Patient Progress: improving  Goal: Patient-Specific Goal (Individualized)  Description: You can add care plan individualizations to a care plan. Examples of Individualization might be:  \"Parent requests to be called daily at 9am for status\", \"I have a hard time hearing out of my right ear\", or \"Do not touch me to wake me up as it startles  me\".  Outcome: Progressing  Goal: Absence of Hospital-Acquired Illness or Injury  Outcome: Progressing  Intervention: Prevent Skin Injury  Recent Flowsheet Documentation  Taken 3/4/2025 0213 by Rosana Peacock RN  Body Position: position changed independently  Intervention: Prevent Infection  Recent Flowsheet Documentation  Taken 3/4/2025 0213 by Rosana Peacock RN  Infection Prevention:   rest/sleep promoted   hand hygiene promoted  Goal: Optimal Comfort and Wellbeing  Outcome: Progressing  Intervention: Provide Person-Centered Care  Recent Flowsheet Documentation  Taken 3/4/2025 0213 by Rosana Peacock RN  Trust Relationship/Rapport:   care explained   choices provided   questions answered   questions encouraged  Goal: Readiness for Transition of Care  Outcome: Progressing     Problem: Postpartum (Vaginal Delivery)  Goal: Successful Parent Role " Transition  Outcome: Progressing  Goal: Hemostasis  Outcome: Progressing  Goal: Absence of Infection Signs and Symptoms  Outcome: Progressing  Goal: Optimal Pain Control and Function  Outcome: Progressing  Goal: Effective Urinary Elimination  Outcome: Progressing     Problem: Comorbidity Management  Goal: Blood Pressure in Desired Range  Outcome: Progressing   Goal Outcome Evaluation:      Plan of Care Reviewed With: patient    Overall Patient Progress: improvingOverall Patient Progress: improving

## 2025-03-05 ENCOUNTER — TELEPHONE (OUTPATIENT)
Dept: OBGYN | Facility: CLINIC | Age: 40
End: 2025-03-05

## 2025-03-05 ENCOUNTER — PATIENT OUTREACH (OUTPATIENT)
Dept: CARE COORDINATION | Facility: CLINIC | Age: 40
End: 2025-03-05

## 2025-03-05 LAB
PATH REPORT.COMMENTS IMP SPEC: NORMAL
PATH REPORT.COMMENTS IMP SPEC: NORMAL
PATH REPORT.FINAL DX SPEC: NORMAL
PATH REPORT.GROSS SPEC: NORMAL
PATH REPORT.MICROSCOPIC SPEC OTHER STN: NORMAL
PATH REPORT.RELEVANT HX SPEC: NORMAL
PHOTO IMAGE: NORMAL

## 2025-03-05 NOTE — TELEPHONE ENCOUNTER
Mariluz, the nurse, calling back.  She just spoke the pt and her BP:    137/78    She will continue to monitor and call with any readings over 140/90.    Pt does have an appt on 3/12 that she will keep.    Aissatou CAMARILLO RN  [facility]

## 2025-03-05 NOTE — TELEPHONE ENCOUNTER
Mariluz, nurse from [facility], calling.    She is out doing a check on the pt, and is calling with updates on her BP.    Pt was discharged from the hospital yesterday. Pt had a vag delivery on 3/2/25.    Is currently taking Nifedipine 60 mg BID.    Has a hx of chronic HTN.    States that pts BP's were:    160/100  170/100.    Pt just took her Nifedipine around 1130 today.    Pt denies HA, vision problems and epigastric pain. Normal refluxes.    Mariluz is going to have the pt take her BP with her home cuff to make sure that it is comparable with what she got using her cuff.    Pt  will then check her BP in 1 hr and call us back if 140/90 or greater.    Also advised to call us back with any concerning sx.    Routed to md on-call.      [facility]

## 2025-03-12 ENCOUNTER — OFFICE VISIT (OUTPATIENT)
Dept: OBGYN | Facility: CLINIC | Age: 40
End: 2025-03-12
Payer: COMMERCIAL

## 2025-03-12 VITALS — SYSTOLIC BLOOD PRESSURE: 128 MMHG | DIASTOLIC BLOOD PRESSURE: 84 MMHG | BODY MASS INDEX: 30.16 KG/M2 | WEIGHT: 164.9 LBS

## 2025-03-12 DIAGNOSIS — Z86.32 HISTORY OF GESTATIONAL DIABETES: ICD-10-CM

## 2025-03-12 PROBLEM — O26.893 HEARTBURN DURING PREGNANCY IN THIRD TRIMESTER: Status: RESOLVED | Noted: 2025-02-19 | Resolved: 2025-03-12

## 2025-03-12 PROBLEM — O16.2 HYPERTENSION AFFECTING PREGNANCY IN SECOND TRIMESTER: Status: RESOLVED | Noted: 2025-02-28 | Resolved: 2025-03-12

## 2025-03-12 PROBLEM — L29.9 PRURITUS OF PREGNANCY IN THIRD TRIMESTER: Status: RESOLVED | Noted: 2025-01-03 | Resolved: 2025-03-12

## 2025-03-12 PROBLEM — R12 HEARTBURN DURING PREGNANCY IN THIRD TRIMESTER: Status: RESOLVED | Noted: 2025-02-19 | Resolved: 2025-03-12

## 2025-03-12 PROBLEM — O10.919 CHRONIC HYPERTENSION AFFECTING PREGNANCY: Status: RESOLVED | Noted: 2025-03-04 | Resolved: 2025-03-12

## 2025-03-12 PROBLEM — O16.3 HYPERTENSION AFFECTING PREGNANCY IN THIRD TRIMESTER: Status: RESOLVED | Noted: 2025-01-06 | Resolved: 2025-03-12

## 2025-03-12 PROBLEM — N83.209 OVARIAN CYST DURING PREGNANCY: Status: RESOLVED | Noted: 2024-10-16 | Resolved: 2025-03-12

## 2025-03-12 PROBLEM — O11.9 CHRONIC HYPERTENSION WITH SUPERIMPOSED PREECLAMPSIA: Status: RESOLVED | Noted: 2025-03-04 | Resolved: 2025-03-12

## 2025-03-12 PROBLEM — O34.80 OVARIAN CYST DURING PREGNANCY: Status: RESOLVED | Noted: 2024-10-16 | Resolved: 2025-03-12

## 2025-03-12 PROBLEM — O34.82 OVARIAN CYST DURING PREGNANCY IN SECOND TRIMESTER: Status: RESOLVED | Noted: 2025-01-06 | Resolved: 2025-03-12

## 2025-03-12 PROBLEM — O99.713 PRURITUS OF PREGNANCY IN THIRD TRIMESTER: Status: RESOLVED | Noted: 2025-01-03 | Resolved: 2025-03-12

## 2025-03-12 PROBLEM — N83.209 OVARIAN CYST DURING PREGNANCY IN SECOND TRIMESTER: Status: RESOLVED | Noted: 2025-01-06 | Resolved: 2025-03-12

## 2025-03-12 PROCEDURE — 3074F SYST BP LT 130 MM HG: CPT | Performed by: OBSTETRICS & GYNECOLOGY

## 2025-03-12 PROCEDURE — 3079F DIAST BP 80-89 MM HG: CPT | Performed by: OBSTETRICS & GYNECOLOGY

## 2025-03-12 PROCEDURE — 99213 OFFICE O/P EST LOW 20 MIN: CPT | Performed by: OBSTETRICS & GYNECOLOGY

## 2025-03-12 NOTE — PROGRESS NOTES
"  Assessment & Plan     Postpartum hypertension  - BP normal on Nifedipine ER 60 mg BID  - Pt to continue current Rx.  - Pt to check BPs 1-2x/day, if SBP < 100 or DBP < 60, Pt to call and will reduce dose    Postpartum care following vaginal delivery   - RTC 5 weeks for PP check  - Pt will want IUD placed so she will also get appt for 2 wks after PP check to get that placed.    History of gestational diabetes  - Pt will need 2hr GTT scheduled after her 6 wk PP check          BMI  Estimated body mass index is 30.16 kg/m  as calculated from the following:    Height as of 25: 1.575 m (5' 2\").    Weight as of this encounter: 74.8 kg (164 lb 14.4 oz).             No follow-ups on file.    Mahad Fuchs is a 39 year old, presenting for the following health issues:  Postpartum Care (BP check  Delivered  3/2/25 Vaginal )    Pt here for BP check. She is PPD#10 s/p  Term infant, preg complic by CHTN w/ superimposed PreE. She has been on Nifedipine ER 60 mg BID. BP today 128/84. No sx's.                        Objective    /84 (BP Location: Right arm, Cuff Size: Adult Regular)   Wt 74.8 kg (164 lb 14.4 oz)   LMP 06/10/2024   Breastfeeding Yes   BMI 30.16 kg/m    Body mass index is 30.16 kg/m .  Physical Exam  Constitutional:       General: She is not in acute distress.     Appearance: Normal appearance. She is normal weight. She is not ill-appearing.   Neurological:      Mental Status: She is alert.                    Signed Electronically by: Yfn Godoy MD  .  "

## 2025-03-12 NOTE — NURSING NOTE
"Chief Complaint   Patient presents with    Postpartum Care     BP check  Delivered  3/2/25 Vaginal        Initial /84 (BP Location: Right arm, Cuff Size: Adult Regular)   Wt 74.8 kg (164 lb 14.4 oz)   LMP 06/10/2024   Breastfeeding Yes   BMI 30.16 kg/m   Estimated body mass index is 30.16 kg/m  as calculated from the following:    Height as of 25: 1.575 m (5' 2\").    Weight as of this encounter: 74.8 kg (164 lb 14.4 oz).  BP completed using cuff size: regular    Questioned patient about current smoking habits.  Pt. has never smoked.          The following HM Due: NONE          Iona Stark, KAYDEN on 3/12/2025 at 11:39 AM   "

## 2025-04-14 ENCOUNTER — PRENATAL OFFICE VISIT (OUTPATIENT)
Dept: OBGYN | Facility: CLINIC | Age: 40
End: 2025-04-14
Payer: COMMERCIAL

## 2025-04-14 VITALS — SYSTOLIC BLOOD PRESSURE: 140 MMHG | BODY MASS INDEX: 29.43 KG/M2 | DIASTOLIC BLOOD PRESSURE: 88 MMHG | WEIGHT: 160.9 LBS

## 2025-04-14 DIAGNOSIS — N91.2 LACTATIONAL AMENORRHEA: ICD-10-CM

## 2025-04-14 DIAGNOSIS — Z11.3 SCREEN FOR STD (SEXUALLY TRANSMITTED DISEASE): ICD-10-CM

## 2025-04-14 DIAGNOSIS — Z86.32 HISTORY OF GESTATIONAL DIABETES: ICD-10-CM

## 2025-04-14 DIAGNOSIS — I10 BENIGN ESSENTIAL HYPERTENSION: ICD-10-CM

## 2025-04-14 PROBLEM — O09.529 ANTEPARTUM MULTIGRAVIDA OF ADVANCED MATERNAL AGE: Status: RESOLVED | Noted: 2025-02-28 | Resolved: 2025-04-14

## 2025-04-14 PROBLEM — O16.9 HYPERTENSION AFFECTING PREGNANCY: Status: RESOLVED | Noted: 2024-09-17 | Resolved: 2025-04-14

## 2025-04-14 PROBLEM — O09.529 AMA (ADVANCED MATERNAL AGE) MULTIGRAVIDA 35+: Status: RESOLVED | Noted: 2024-09-17 | Resolved: 2025-04-14

## 2025-04-14 PROBLEM — O09.299 HISTORY OF GESTATIONAL DIABETES IN PRIOR PREGNANCY, CURRENTLY PREGNANT: Status: RESOLVED | Noted: 2024-09-17 | Resolved: 2025-04-14

## 2025-04-14 PROCEDURE — 3077F SYST BP >= 140 MM HG: CPT | Performed by: OBSTETRICS & GYNECOLOGY

## 2025-04-14 PROCEDURE — 99213 OFFICE O/P EST LOW 20 MIN: CPT | Mod: 25 | Performed by: OBSTETRICS & GYNECOLOGY

## 2025-04-14 PROCEDURE — 87491 CHLMYD TRACH DNA AMP PROBE: CPT | Performed by: OBSTETRICS & GYNECOLOGY

## 2025-04-14 PROCEDURE — 87591 N.GONORRHOEAE DNA AMP PROB: CPT | Performed by: OBSTETRICS & GYNECOLOGY

## 2025-04-14 PROCEDURE — 0503F POSTPARTUM CARE VISIT: CPT | Performed by: OBSTETRICS & GYNECOLOGY

## 2025-04-14 PROCEDURE — 99207 PR POST PARTUM EXAM: CPT | Performed by: OBSTETRICS & GYNECOLOGY

## 2025-04-14 PROCEDURE — 3079F DIAST BP 80-89 MM HG: CPT | Performed by: OBSTETRICS & GYNECOLOGY

## 2025-04-14 RX ORDER — MEDROXYPROGESTERONE ACETATE 10 MG
10 TABLET ORAL DAILY
Qty: 10 TABLET | Refills: 0 | Status: SHIPPED | OUTPATIENT
Start: 2025-04-14 | End: 2025-04-24

## 2025-04-14 ASSESSMENT — EDINBURGH POSTNATAL DEPRESSION SCALE (EPDS)
I HAVE BEEN ANXIOUS OR WORRIED FOR NO GOOD REASON: NO, NOT AT ALL
THINGS HAVE BEEN GETTING ON TOP OF ME: YES, MOST OF THE TIME I HAVEN'T BEEN ABLE TO COPE AT ALL
I HAVE FELT SCARED OR PANICKY FOR NO GOOD REASON: NO, NOT AT ALL
I HAVE BEEN ABLE TO LAUGH AND SEE THE FUNNY SIDE OF THINGS: AS MUCH AS I ALWAYS COULD
THE THOUGHT OF HARMING MYSELF HAS OCCURRED TO ME: NEVER
I HAVE LOOKED FORWARD WITH ENJOYMENT TO THINGS: AS MUCH AS I EVER DID
I HAVE BEEN SO UNHAPPY THAT I HAVE BEEN CRYING: NO, NEVER
I HAVE FELT SAD OR MISERABLE: NO, NOT AT ALL
I HAVE BLAMED MYSELF UNNECESSARILY WHEN THINGS WENT WRONG: NOT VERY OFTEN
TOTAL SCORE: 4
I HAVE BEEN SO UNHAPPY THAT I HAVE HAD DIFFICULTY SLEEPING: NOT AT ALL

## 2025-04-14 NOTE — NURSING NOTE
"Chief Complaint   Patient presents with    Postpartum Care     3/2/25 Vaginal Girl  6# 12.6 ozs   Pap 24 NIL  HPV= Neg   IUD in 2 weeks        Initial BP (!) 140/88 (BP Location: Right arm, Cuff Size: Adult Regular)   Wt 73 kg (160 lb 14.4 oz)   LMP 06/10/2024   Breastfeeding No   BMI 29.43 kg/m   Estimated body mass index is 29.43 kg/m  as calculated from the following:    Height as of 25: 1.575 m (5' 2\").    Weight as of this encounter: 73 kg (160 lb 14.4 oz).  BP completed using cuff size: regular    Questioned patient about current smoking habits.  Pt. has never smoked.          The following HM Due: NONE        Iona Stark, KAYDEN on 2025 at 3:31 PM         "

## 2025-04-14 NOTE — PROGRESS NOTES
SUBJECTIVE: Shila is here for a 6-week postpartum checkup.    Delivery date was 3/2/25 . She had a  of a viable girl, weight 6 pounds 12.6 oz., with CHTN and on Nifedipine ER 60 mg BID.  Since delivery, she has been breast feeding.  She has NA signs of infection, bleeding or other complications.  She is not pregnant.  We discussed contraceptions and she has chosen Mirena IUD.  She  has not had intercourse since delivery and complains of No discomfort. Patient screened for postpartum depression and complaints are NEGATIVE. Screening has also been completed for intimate partner violence.    EXAM:  Today's Depression Rating was     [place] Depression Scale  Thoughts of Harming Self: (Patient-Rptd) Never  Total Score: (Patient-Rptd) 4       2010     4:15 PM   PHQ-9 SCORE   PHQ-9 Total Score 3     BP (!) 140/88 (BP Location: Right arm, Cuff Size: Adult Regular)   Wt 73 kg (160 lb 14.4 oz)   LMP 06/10/2024   Breastfeeding No   BMI 29.43 kg/m    Abdomen- Abdomen soft, non-tender. BS normal. No masses, organomegaly  Pelvic- Exam chaperoned by nurse, External genitalia normal, [place]'s glands normal, [place] glands normal, Urethral meatus normal, Urethra normal, Bladder normal, Vagina with normal rugae, no abnormal lesions, no abnormal discharge, Normal cervix without lesions or mucopus, no cervical motion tenderness, Uterus normal size, shape, and contour, no masses, non-tender, Adnexa normal size without masses or tenderness bilaterally, Anus normal, GC/Chlam probe was Done      ASSESSMENT:   Encounter Diagnoses   Name Primary?    Postpartum care following vaginal delivery Yes    Benign essential hypertension     History of gestational diabetes     Lactational amenorrhea     Screen for STD (sexually transmitted disease)          PLAN:  1) 2hr GTT  2) Continue current Nifedipine until follow-up w/ PCP. Pt to get PCP appt in next month to manage CHTN.  3) RTC 2 weeks for IUD insertion.  4) Not due for  Pap/HPV until 9/2027.  5) Rx Provera 10 mg x 10 days to induce w/d prior to IUD insertion.  6) GC/Chlam done in antic of IUD.  7) Return as needed or at time of next expected pelvic, or breast exam.    Yfn Godoy MD

## 2025-04-15 LAB
C TRACH DNA SPEC QL NAA+PROBE: NEGATIVE
N GONORRHOEA DNA SPEC QL NAA+PROBE: NEGATIVE
SPECIMEN TYPE: NORMAL
SPECIMEN TYPE: NORMAL

## 2025-04-29 ENCOUNTER — MEDICAL CORRESPONDENCE (OUTPATIENT)
Dept: HEALTH INFORMATION MANAGEMENT | Facility: CLINIC | Age: 40
End: 2025-04-29

## 2025-04-29 ENCOUNTER — OFFICE VISIT (OUTPATIENT)
Dept: OBGYN | Facility: CLINIC | Age: 40
End: 2025-04-29
Payer: COMMERCIAL

## 2025-04-29 VITALS — DIASTOLIC BLOOD PRESSURE: 82 MMHG | SYSTOLIC BLOOD PRESSURE: 128 MMHG | BODY MASS INDEX: 29.47 KG/M2 | WEIGHT: 161.1 LBS

## 2025-04-29 DIAGNOSIS — Z97.5 IUD (INTRAUTERINE DEVICE) IN PLACE: ICD-10-CM

## 2025-04-29 DIAGNOSIS — Z30.430 ENCOUNTER FOR INSERTION OF INTRAUTERINE CONTRACEPTIVE DEVICE: Primary | ICD-10-CM

## 2025-04-29 PROCEDURE — 3079F DIAST BP 80-89 MM HG: CPT | Performed by: OBSTETRICS & GYNECOLOGY

## 2025-04-29 PROCEDURE — 3074F SYST BP LT 130 MM HG: CPT | Performed by: OBSTETRICS & GYNECOLOGY

## 2025-04-29 PROCEDURE — 58300 INSERT INTRAUTERINE DEVICE: CPT | Performed by: OBSTETRICS & GYNECOLOGY

## 2025-04-29 NOTE — PROGRESS NOTES
PROCEDURE:  IUD Insertion     Indication:  Contraception.    No absolute contraindication to IUD.  The procedure was explained.  Informed consent was obtained.     Past History:    No LMP recorded. Amenorrheic despite Provera w/d. However has not had intercourse since her baby delivered 3/2/2025.        OBJECTIVE:   Vital Signs taken today were reviewed on the flowsheet in the electronic medical record.    Pelvic Exam:     External appearance, vagina and cervix normal.   Uterus was normal size, mobile, smooth and nontender.       Adenexae were nontender, without palpable masses.              Labs:   Previous GC/Chlamydia screen negative from:   Neg  UPT Neg       Procedure in detail:  IUD Insertion    A sterile speculum was placed in the vagina.  The visualized cervix was painted with Betadine.  No cervical lesions were noted.  The uterus was sounded to 7.5 cm.  Using sterile technique, the IUD was inserted without difficulty.     Type of IUD:  Mirena     The IUD strings were trimmed to 3 cm.  The patient tolerated the procedure well.     There were no complications and no significant bleeding was noted upon the completion of the exam.        ASSESSMENT:  Encounter Diagnosis   Name Primary?    Encounter for insertion of intrauterine contraceptive device Yes          PLAN:  The patient was instructed on string checks.    Yfn Godoy MD  [facility]

## 2025-04-29 NOTE — NURSING NOTE
"Chief Complaint   Patient presents with    Contraception     Mirena IUD insert   G/C = Neg        Initial /82 (BP Location: Right arm, Cuff Size: Adult Regular)   Wt 73.1 kg (161 lb 1.6 oz)   Breastfeeding Yes   BMI 29.47 kg/m   Estimated body mass index is 29.47 kg/m  as calculated from the following:    Height as of 25: 1.575 m (5' 2\").    Weight as of this encounter: 73.1 kg (161 lb 1.6 oz).  BP completed using cuff size: regular    Questioned patient about current smoking habits.  Pt. has never smoked.          The following HM Due: NONE          Iona Stark, KAYDEN on 2025 at 2:06 PM   "